# Patient Record
Sex: FEMALE | Race: WHITE | NOT HISPANIC OR LATINO | Employment: OTHER | URBAN - METROPOLITAN AREA
[De-identification: names, ages, dates, MRNs, and addresses within clinical notes are randomized per-mention and may not be internally consistent; named-entity substitution may affect disease eponyms.]

---

## 2023-07-05 PROBLEM — R32 URINARY INCONTINENCE: Status: ACTIVE | Noted: 2023-07-05

## 2023-07-05 PROBLEM — R53.81 DEBILITY: Status: ACTIVE | Noted: 2023-07-05

## 2023-07-05 PROBLEM — I34.1 MITRAL VALVE PROLAPSE: Status: ACTIVE | Noted: 2023-07-05

## 2023-07-05 PROBLEM — E78.1 HYPERTRIGLYCERIDEMIA: Status: ACTIVE | Noted: 2023-07-05

## 2023-07-05 PROBLEM — T18.128A FOOD IMPACTION OF ESOPHAGUS: Status: ACTIVE | Noted: 2023-07-05
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
## 2023-07-05 PROBLEM — K21.9 ESOPHAGEAL REFLUX: Status: ACTIVE | Noted: 2023-07-05

## 2023-07-05 PROBLEM — K44.9 HIATAL HERNIA: Status: ACTIVE | Noted: 2023-07-05

## 2023-07-05 PROBLEM — R60.9 CHRONIC EDEMA: Status: ACTIVE | Noted: 2023-07-05

## 2023-07-05 PROBLEM — I10 BENIGN ESSENTIAL HYPERTENSION: Status: ACTIVE | Noted: 2023-07-05

## 2023-07-05 PROBLEM — K22.2 ESOPHAGEAL STRICTURE: Status: ACTIVE | Noted: 2023-07-05

## 2023-07-05 PROBLEM — J30.9 ALLERGIC RHINITIS: Status: ACTIVE | Noted: 2023-07-05

## 2023-07-05 PROBLEM — M89.9 BONE DISORDER: Status: ACTIVE | Noted: 2023-07-05

## 2023-07-05 PROBLEM — M81.0 OSTEOPOROSIS: Status: ACTIVE | Noted: 2023-07-05

## 2023-07-05 PROBLEM — M19.90 OSTEOARTHRITIS (ARTHRITIS DUE TO WEAR AND TEAR OF JOINTS): Status: ACTIVE | Noted: 2023-07-05

## 2023-07-05 PROBLEM — L81.9 HYPERPIGMENTED SKIN LESION: Status: ACTIVE | Noted: 2023-07-05

## 2023-07-05 PROBLEM — R13.10 DYSPHAGIA: Status: ACTIVE | Noted: 2023-07-05

## 2023-07-05 RX ORDER — ACEBUTOLOL HYDROCHLORIDE 200 MG/1
1 CAPSULE ORAL DAILY
COMMUNITY
End: 2023-07-06 | Stop reason: SDUPTHER

## 2023-07-05 RX ORDER — CHOLECALCIFEROL (VITAMIN D3) 125 MCG
125 CAPSULE ORAL 2 TIMES WEEKLY
COMMUNITY

## 2023-07-05 RX ORDER — PANTOPRAZOLE SODIUM 40 MG/1
40 TABLET, DELAYED RELEASE ORAL
COMMUNITY
Start: 2022-06-20 | End: 2023-07-06 | Stop reason: SDUPTHER

## 2023-07-05 RX ORDER — FUROSEMIDE 40 MG/1
1 TABLET ORAL DAILY
COMMUNITY
End: 2023-07-06 | Stop reason: SDUPTHER

## 2023-07-05 RX ORDER — FLUTICASONE PROPIONATE 50 MCG
1 SPRAY, SUSPENSION (ML) NASAL DAILY
COMMUNITY
End: 2023-07-06 | Stop reason: SDUPTHER

## 2023-07-05 RX ORDER — EPINEPHRINE 0.15 MG/.3ML
1 INJECTION INTRAMUSCULAR ONCE
COMMUNITY

## 2023-07-05 RX ORDER — GUAIFENESIN 1200 MG
325 TABLET, EXTENDED RELEASE 12 HR ORAL EVERY 4 HOURS PRN
COMMUNITY

## 2023-07-05 RX ORDER — POTASSIUM CHLORIDE 750 MG/1
10 CAPSULE, EXTENDED RELEASE ORAL
COMMUNITY
End: 2023-07-06 | Stop reason: SDUPTHER

## 2023-07-06 ENCOUNTER — OFFICE VISIT (OUTPATIENT)
Dept: PRIMARY CARE | Facility: CLINIC | Age: 88
End: 2023-07-06
Payer: MEDICARE

## 2023-07-06 VITALS
RESPIRATION RATE: 16 BRPM | BODY MASS INDEX: 26.07 KG/M2 | TEMPERATURE: 96.8 F | SYSTOLIC BLOOD PRESSURE: 135 MMHG | HEART RATE: 96 BPM | DIASTOLIC BLOOD PRESSURE: 88 MMHG | OXYGEN SATURATION: 93 % | WEIGHT: 138 LBS

## 2023-07-06 DIAGNOSIS — J30.9 ALLERGIC RHINITIS, UNSPECIFIED SEASONALITY, UNSPECIFIED TRIGGER: ICD-10-CM

## 2023-07-06 DIAGNOSIS — I10 BENIGN ESSENTIAL HYPERTENSION: Primary | ICD-10-CM

## 2023-07-06 DIAGNOSIS — E87.6 HYPOKALEMIA: ICD-10-CM

## 2023-07-06 DIAGNOSIS — K22.2 ESOPHAGEAL STRICTURE: ICD-10-CM

## 2023-07-06 DIAGNOSIS — E78.1 HYPERTRIGLYCERIDEMIA: ICD-10-CM

## 2023-07-06 DIAGNOSIS — M81.0 AGE-RELATED OSTEOPOROSIS WITHOUT CURRENT PATHOLOGICAL FRACTURE: ICD-10-CM

## 2023-07-06 DIAGNOSIS — R60.9 CHRONIC EDEMA: ICD-10-CM

## 2023-07-06 PROBLEM — R13.10 DYSPHAGIA: Status: RESOLVED | Noted: 2023-07-05 | Resolved: 2023-07-06
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
## 2023-07-06 PROBLEM — M89.9 BONE DISORDER: Status: RESOLVED | Noted: 2023-07-05 | Resolved: 2023-07-06

## 2023-07-06 PROBLEM — T18.128A FOOD IMPACTION OF ESOPHAGUS: Status: RESOLVED | Noted: 2023-07-05 | Resolved: 2023-07-06

## 2023-07-06 PROBLEM — L81.9 HYPERPIGMENTED SKIN LESION: Status: RESOLVED | Noted: 2023-07-05 | Resolved: 2023-07-06

## 2023-07-06 PROBLEM — K21.9 ESOPHAGEAL REFLUX: Status: RESOLVED | Noted: 2023-07-05 | Resolved: 2023-07-06

## 2023-07-06 PROCEDURE — 1126F AMNT PAIN NOTED NONE PRSNT: CPT

## 2023-07-06 PROCEDURE — 3079F DIAST BP 80-89 MM HG: CPT

## 2023-07-06 PROCEDURE — 1036F TOBACCO NON-USER: CPT

## 2023-07-06 PROCEDURE — 3075F SYST BP GE 130 - 139MM HG: CPT

## 2023-07-06 PROCEDURE — 1159F MED LIST DOCD IN RCRD: CPT

## 2023-07-06 PROCEDURE — 99214 OFFICE O/P EST MOD 30 MIN: CPT

## 2023-07-06 PROCEDURE — 1160F RVW MEDS BY RX/DR IN RCRD: CPT

## 2023-07-06 RX ORDER — ACEBUTOLOL HYDROCHLORIDE 200 MG/1
200 CAPSULE ORAL DAILY
Qty: 90 CAPSULE | Refills: 1 | Status: SHIPPED | OUTPATIENT
Start: 2023-07-06 | End: 2024-01-05

## 2023-07-06 RX ORDER — FLUTICASONE PROPIONATE 50 MCG
1 SPRAY, SUSPENSION (ML) NASAL DAILY
Qty: 16 G | Refills: 3 | Status: SHIPPED | OUTPATIENT
Start: 2023-07-06

## 2023-07-06 RX ORDER — POTASSIUM CHLORIDE 750 MG/1
10 CAPSULE, EXTENDED RELEASE ORAL DAILY
Qty: 90 CAPSULE | Refills: 1 | Status: SHIPPED | OUTPATIENT
Start: 2023-07-06 | End: 2024-01-30 | Stop reason: SDUPTHER

## 2023-07-06 RX ORDER — PANTOPRAZOLE SODIUM 40 MG/1
40 TABLET, DELAYED RELEASE ORAL
Qty: 90 TABLET | Refills: 1 | Status: CANCELLED | OUTPATIENT
Start: 2023-07-06

## 2023-07-06 RX ORDER — PANTOPRAZOLE SODIUM 40 MG/1
40 TABLET, DELAYED RELEASE ORAL
Qty: 90 TABLET | Refills: 3 | Status: SHIPPED | OUTPATIENT
Start: 2023-07-06

## 2023-07-06 RX ORDER — FUROSEMIDE 40 MG/1
40 TABLET ORAL DAILY
Qty: 90 TABLET | Refills: 1 | Status: SHIPPED | OUTPATIENT
Start: 2023-07-06 | End: 2024-01-30 | Stop reason: SDUPTHER

## 2023-07-06 SDOH — ECONOMIC STABILITY: FOOD INSECURITY: WITHIN THE PAST 12 MONTHS, YOU WORRIED THAT YOUR FOOD WOULD RUN OUT BEFORE YOU GOT MONEY TO BUY MORE.: NEVER TRUE

## 2023-07-06 SDOH — ECONOMIC STABILITY: FOOD INSECURITY: WITHIN THE PAST 12 MONTHS, THE FOOD YOU BOUGHT JUST DIDN'T LAST AND YOU DIDN'T HAVE MONEY TO GET MORE.: NEVER TRUE

## 2023-07-06 ASSESSMENT — ENCOUNTER SYMPTOMS
CONSTITUTIONAL NEGATIVE: 1
LOSS OF SENSATION IN FEET: 0
RESPIRATORY NEGATIVE: 1
CARDIOVASCULAR NEGATIVE: 1
EYES NEGATIVE: 1
OCCASIONAL FEELINGS OF UNSTEADINESS: 0
GASTROINTESTINAL NEGATIVE: 1
NEUROLOGICAL NEGATIVE: 1
DEPRESSION: 0
ENDOCRINE NEGATIVE: 1
MUSCULOSKELETAL NEGATIVE: 1
HEMATOLOGIC/LYMPHATIC NEGATIVE: 1
PSYCHIATRIC NEGATIVE: 1

## 2023-07-06 ASSESSMENT — PATIENT HEALTH QUESTIONNAIRE - PHQ9
2. FEELING DOWN, DEPRESSED OR HOPELESS: NOT AT ALL
1. LITTLE INTEREST OR PLEASURE IN DOING THINGS: NOT AT ALL
SUM OF ALL RESPONSES TO PHQ9 QUESTIONS 1 & 2: 0

## 2023-07-06 ASSESSMENT — PAIN SCALES - GENERAL: PAINLEVEL: 0-NO PAIN

## 2023-07-06 ASSESSMENT — LIFESTYLE VARIABLES
HOW OFTEN DO YOU HAVE SIX OR MORE DRINKS ON ONE OCCASION: NEVER
SKIP TO QUESTIONS 9-10: 1
HOW OFTEN DO YOU HAVE A DRINK CONTAINING ALCOHOL: NEVER
AUDIT-C TOTAL SCORE: 0
HOW MANY STANDARD DRINKS CONTAINING ALCOHOL DO YOU HAVE ON A TYPICAL DAY: PATIENT DOES NOT DRINK

## 2023-07-06 NOTE — PROGRESS NOTES
Subjective   Patient ID: Nadja Rodriguez is a 88 y.o. female who presents for routine follow up.    Cards: No cardiology recently after her last cardiologist left a few years ago.    Diet: Does her own cooking, cooking with microwave. Using microwave, crock pot and [employer] electric grill. Trouble using her left hand. Well balanced diet.   Exercise: Goes three times per week to [employer] to use the pool, water exercises  Weight: Stable  Water: Drinking several bottles per water  Sleep: Waking up twice per night to use bathroom, up at 5:30, remains on schedule despite being retired. Getting about 7-8 hours per night  Social: , lives alone. 2 adult children (3 have passed from cancer, MI, motor cycle accident) son lives in [place], identifies her daughter in law's mother as support source. 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    HENT: Negative.     Eyes: Negative.    Respiratory: Negative.     Cardiovascular: Negative.    Gastrointestinal: Negative.    Endocrine: Negative.    Genitourinary: Negative.    Musculoskeletal: Negative.    Skin: Negative.    Neurological: Negative.    Hematological: Negative.    Psychiatric/Behavioral: Negative.          Current Outpatient Medications   Medication Sig Dispense Refill    acetaminophen (TylenoL) 325 mg capsule Take 1 capsule (325 mg) by mouth every 4 hours if needed for mild pain (1 - 3) or moderate pain (4 - 6).      cholecalciferol (Vitamin D-3) 125 MCG (5000 UT) capsule Take 1 capsule (125 mcg) by mouth once daily.      EPINEPHrine (EpiPen Jr) 0.15 mg/0.3 mL injection syringe Inject 0.3 mL (0.15 mg) as directed 1 time.      acebutolol (Sectral) 200 mg capsule Take 1 capsule (200 mg) by mouth once daily. 90 capsule 1    denosumab (Prolia) 60 mg/mL syringe INJECT 60MG SUBCUTANEOUSLY ONCE AND REPEAT EVERY 6 MONTHS 1 mL 1    fluticasone (Flonase) 50 mcg/actuation nasal spray Administer 1 spray into each  nostril once daily. 16 g 3    furosemide (Lasix) 40 mg tablet Take 1 tablet (40 mg) by mouth once daily. 90 tablet 1    pantoprazole (ProtoNix) 40 mg EC tablet Take 1 tablet (40 mg) by mouth once daily in the morning. Take before meals. 90 tablet 3    potassium chloride ER (Micro-K) 10 mEq ER capsule Take 1 capsule (10 mEq) by mouth once daily. 90 capsule 1     No current facility-administered medications for this visit.     Past Surgical History:   Procedure Laterality Date    OTHER SURGICAL HISTORY  2019    Hysterectomy    OTHER SURGICAL HISTORY  2019    Knee surgery     Family History   Problem Relation Name Age of Onset    Lung cancer Other        Social History     Tobacco Use    Smoking status: Never    Smokeless tobacco: Never   Vaping Use    Vaping Use: Never used   Substance Use Topics    Alcohol use: Never    Drug use: Never        Objective     Visit Vitals  /88 (BP Location: Left arm, Patient Position: Sitting, BP Cuff Size: Adult)   Pulse 96   Temp 36 °C (96.8 °F) (Temporal)   Resp 16   Wt 62.6 kg (138 lb)   SpO2 93%   BMI 26.07 kg/m²   Smoking Status Never   BSA 1.64 m²        Physical Exam  Constitutional:       Appearance: Normal appearance.   HENT:      Head: Normocephalic and atraumatic.   Eyes:      Extraocular Movements: Extraocular movements intact.      Pupils: Pupils are equal, round, and reactive to light.   Cardiovascular:      Rate and Rhythm: Normal rate and regular rhythm.   Pulmonary:      Effort: Pulmonary effort is normal.      Breath sounds: Normal breath sounds.   Abdominal:      General: Abdomen is flat. Bowel sounds are normal.      Palpations: Abdomen is soft.   Musculoskeletal:         General: Normal range of motion.      Right lower le+ Edema present.      Left lower le+ Edema present.   Neurological:      General: No focal deficit present.      Mental Status: She is alert and oriented to person, place, and time.   Psychiatric:         Mood and Affect:  Mood normal.         Behavior: Behavior normal.           Assessment/Plan   Problem List Items Addressed This Visit       Benign essential hypertension - Primary     Well controlled in office today at 135/88  Continue acebutolol 200mg daily         Relevant Medications    furosemide (Lasix) 40 mg tablet    acebutolol (Sectral) 200 mg capsule    Other Relevant Orders    CBC    Comprehensive Metabolic Panel    Follow Up In Primary Care - Established    Allergic rhinitis    Relevant Medications    fluticasone (Flonase) 50 mcg/actuation nasal spray    Esophageal stricture     Following periodically with GI  Has not been taking daily PPI as recommended for hx gastritis found on EGD 6/2022    Discussed importance of daily PPI, reordered pantoprazole 40mg daily         Relevant Medications    pantoprazole (ProtoNix) 40 mg EC tablet    Chronic edema    Relevant Orders    TSH with reflex to Free T4 if abnormal    Osteoporosis     DEXA from 11/2021 showing osteoporosis in all sites, patient not previously treated    Defer oral bisphospohnate d/t hx esophageal stricture  Start prolia injections q6months, order printed and faxed to infusion center  Check BMP and vitamin D levels         Relevant Medications    denosumab (Prolia) 60 mg/mL syringe    Other Relevant Orders    Vitamin D, Total    Follow Up In Primary Care - Established    Hypertriglyceridemia    Relevant Orders    Lipid Panel     Other Visit Diagnoses       Hypokalemia        Relevant Medications    potassium chloride ER (Micro-K) 10 mEq ER capsule          All pertinent lab work and results were reviewed with patient.     Follow up with me in 6 months or sooner as needed    Rossi Steward, CRYS-CNS

## 2023-07-06 NOTE — PATIENT INSTRUCTIONS
Thank you for coming to see me today.  If you have any questions or concerns following our visit, please contact the office.  Phone: [phone]    Follow up with me in 6 months or sooner as needed    1)  START prolia injections- infusion center will call you to schedule appointment to have this administered    2) START pantoprazole 40mg daily 30 minutes before breakfast to help with inflammation of stomach lining    3) Get fasting labwork in the next 1-2 weeks.  The lab is down the [address] from our office.

## 2023-07-06 NOTE — ASSESSMENT & PLAN NOTE
DEXA from 11/2021 showing osteoporosis in all sites, patient not previously treated    Defer oral bisphospohnate d/t hx esophageal stricture  Start prolia injections q6months, order printed and faxed to infusion center  Check BMP and vitamin D levels

## 2023-07-06 NOTE — ASSESSMENT & PLAN NOTE
Following periodically with GI  Has not been taking daily PPI as recommended for hx gastritis found on EGD 6/2022    Discussed importance of daily PPI, reordered pantoprazole 40mg daily

## 2023-07-10 ENCOUNTER — LAB (OUTPATIENT)
Dept: LAB | Facility: LAB | Age: 88
End: 2023-07-10
Payer: MEDICARE

## 2023-07-10 DIAGNOSIS — M81.0 AGE-RELATED OSTEOPOROSIS WITHOUT CURRENT PATHOLOGICAL FRACTURE: ICD-10-CM

## 2023-07-10 DIAGNOSIS — R60.9 CHRONIC EDEMA: ICD-10-CM

## 2023-07-10 DIAGNOSIS — E78.1 HYPERTRIGLYCERIDEMIA: ICD-10-CM

## 2023-07-10 DIAGNOSIS — I10 BENIGN ESSENTIAL HYPERTENSION: ICD-10-CM

## 2023-07-10 LAB
ALANINE AMINOTRANSFERASE (SGPT) (U/L) IN SER/PLAS: 16 U/L (ref 7–45)
ALBUMIN (G/DL) IN SER/PLAS: 3.9 G/DL (ref 3.4–5)
ALKALINE PHOSPHATASE (U/L) IN SER/PLAS: 138 U/L (ref 33–136)
ANION GAP IN SER/PLAS: 17 MMOL/L (ref 10–20)
ASPARTATE AMINOTRANSFERASE (SGOT) (U/L) IN SER/PLAS: 20 U/L (ref 9–39)
BILIRUBIN TOTAL (MG/DL) IN SER/PLAS: 0.5 MG/DL (ref 0–1.2)
CALCIDIOL (25 OH VITAMIN D3) (NG/ML) IN SER/PLAS: 88 NG/ML
CALCIUM (MG/DL) IN SER/PLAS: 9.3 MG/DL (ref 8.6–10.3)
CARBON DIOXIDE, TOTAL (MMOL/L) IN SER/PLAS: 28 MMOL/L (ref 21–32)
CHLORIDE (MMOL/L) IN SER/PLAS: 101 MMOL/L (ref 98–107)
CHOLESTEROL (MG/DL) IN SER/PLAS: 172 MG/DL (ref 0–199)
CHOLESTEROL IN HDL (MG/DL) IN SER/PLAS: 30 MG/DL
CHOLESTEROL/HDL RATIO: 5.7
CREATININE (MG/DL) IN SER/PLAS: 0.53 MG/DL (ref 0.5–1.05)
ERYTHROCYTE DISTRIBUTION WIDTH (RATIO) BY AUTOMATED COUNT: 14 % (ref 11.5–14.5)
ERYTHROCYTE MEAN CORPUSCULAR HEMOGLOBIN CONCENTRATION (G/DL) BY AUTOMATED: 32 G/DL (ref 32–36)
ERYTHROCYTE MEAN CORPUSCULAR VOLUME (FL) BY AUTOMATED COUNT: 88 FL (ref 80–100)
ERYTHROCYTES (10*6/UL) IN BLOOD BY AUTOMATED COUNT: 4.78 X10E12/L (ref 4–5.2)
GFR FEMALE: 90 ML/MIN/1.73M2
GLUCOSE (MG/DL) IN SER/PLAS: 83 MG/DL (ref 74–99)
HEMATOCRIT (%) IN BLOOD BY AUTOMATED COUNT: 42.2 % (ref 36–46)
HEMOGLOBIN (G/DL) IN BLOOD: 13.5 G/DL (ref 12–16)
LDL: 105 MG/DL (ref 0–99)
LEUKOCYTES (10*3/UL) IN BLOOD BY AUTOMATED COUNT: 8.5 X10E9/L (ref 4.4–11.3)
PLATELETS (10*3/UL) IN BLOOD AUTOMATED COUNT: 245 X10E9/L (ref 150–450)
POTASSIUM (MMOL/L) IN SER/PLAS: 3.8 MMOL/L (ref 3.5–5.3)
PROTEIN TOTAL: 6.6 G/DL (ref 6.4–8.2)
SODIUM (MMOL/L) IN SER/PLAS: 142 MMOL/L (ref 136–145)
THYROTROPIN (MIU/L) IN SER/PLAS BY DETECTION LIMIT <= 0.05 MIU/L: 4.19 MIU/L (ref 0.44–3.98)
THYROXINE (T4) FREE (NG/DL) IN SER/PLAS: 0.93 NG/DL (ref 0.61–1.12)
TRIGLYCERIDE (MG/DL) IN SER/PLAS: 184 MG/DL (ref 0–149)
UREA NITROGEN (MG/DL) IN SER/PLAS: 14 MG/DL (ref 6–23)
VLDL: 37 MG/DL (ref 0–40)

## 2023-07-10 PROCEDURE — 84439 ASSAY OF FREE THYROXINE: CPT

## 2023-07-10 PROCEDURE — 82306 VITAMIN D 25 HYDROXY: CPT

## 2023-07-10 PROCEDURE — 84443 ASSAY THYROID STIM HORMONE: CPT

## 2023-07-10 PROCEDURE — 80061 LIPID PANEL: CPT

## 2023-07-10 PROCEDURE — 36415 COLL VENOUS BLD VENIPUNCTURE: CPT

## 2023-07-10 PROCEDURE — 80053 COMPREHEN METABOLIC PANEL: CPT

## 2023-07-10 PROCEDURE — 85027 COMPLETE CBC AUTOMATED: CPT

## 2024-01-02 DIAGNOSIS — I10 BENIGN ESSENTIAL HYPERTENSION: ICD-10-CM

## 2024-01-05 RX ORDER — ACEBUTOLOL HYDROCHLORIDE 200 MG/1
200 CAPSULE ORAL DAILY
Qty: 90 CAPSULE | Refills: 1 | Status: SHIPPED | OUTPATIENT
Start: 2024-01-05

## 2024-01-08 ENCOUNTER — APPOINTMENT (OUTPATIENT)
Dept: PRIMARY CARE | Facility: CLINIC | Age: 89
End: 2024-01-08
Payer: MEDICARE

## 2024-01-10 ENCOUNTER — APPOINTMENT (OUTPATIENT)
Dept: PRIMARY CARE | Facility: CLINIC | Age: 89
End: 2024-01-10
Payer: MEDICARE

## 2024-01-30 ENCOUNTER — OFFICE VISIT (OUTPATIENT)
Dept: PRIMARY CARE | Facility: CLINIC | Age: 89
End: 2024-01-30
Payer: MEDICARE

## 2024-01-30 ENCOUNTER — LAB (OUTPATIENT)
Dept: LAB | Facility: LAB | Age: 89
End: 2024-01-30
Payer: MEDICARE

## 2024-01-30 VITALS
WEIGHT: 141.5 LBS | SYSTOLIC BLOOD PRESSURE: 137 MMHG | TEMPERATURE: 96.8 F | HEART RATE: 77 BPM | RESPIRATION RATE: 18 BRPM | BODY MASS INDEX: 27.78 KG/M2 | HEIGHT: 60 IN | DIASTOLIC BLOOD PRESSURE: 82 MMHG | OXYGEN SATURATION: 96 %

## 2024-01-30 DIAGNOSIS — Z23 NEED FOR PNEUMOCOCCAL VACCINATION: ICD-10-CM

## 2024-01-30 DIAGNOSIS — Z00.00 ENCOUNTER FOR MEDICARE ANNUAL WELLNESS EXAM: ICD-10-CM

## 2024-01-30 DIAGNOSIS — M81.8 OSTEOPOROSIS, IDIOPATHIC: ICD-10-CM

## 2024-01-30 DIAGNOSIS — M81.0 AGE-RELATED OSTEOPOROSIS WITHOUT CURRENT PATHOLOGICAL FRACTURE: Primary | ICD-10-CM

## 2024-01-30 DIAGNOSIS — I10 BENIGN ESSENTIAL HYPERTENSION: ICD-10-CM

## 2024-01-30 DIAGNOSIS — E87.6 HYPOKALEMIA: ICD-10-CM

## 2024-01-30 DIAGNOSIS — M81.0 AGE-RELATED OSTEOPOROSIS WITHOUT CURRENT PATHOLOGICAL FRACTURE: ICD-10-CM

## 2024-01-30 DIAGNOSIS — Z00.00 ROUTINE GENERAL MEDICAL EXAMINATION AT HEALTH CARE FACILITY: ICD-10-CM

## 2024-01-30 LAB
25(OH)D3 SERPL-MCNC: 111 NG/ML (ref 30–100)
ALBUMIN SERPL BCP-MCNC: 4.3 G/DL (ref 3.4–5)
ALP SERPL-CCNC: 71 U/L (ref 33–136)
ALT SERPL W P-5'-P-CCNC: 17 U/L (ref 7–45)
ANION GAP SERPL CALC-SCNC: 13 MMOL/L (ref 10–20)
AST SERPL W P-5'-P-CCNC: 25 U/L (ref 9–39)
BILIRUB SERPL-MCNC: 0.5 MG/DL (ref 0–1.2)
BUN SERPL-MCNC: 11 MG/DL (ref 6–23)
CA-I BLD-SCNC: 1.21 MMOL/L (ref 1.1–1.33)
CALCIUM SERPL-MCNC: 9.5 MG/DL (ref 8.6–10.3)
CHLORIDE SERPL-SCNC: 102 MMOL/L (ref 98–107)
CO2 SERPL-SCNC: 27 MMOL/L (ref 21–32)
CREAT SERPL-MCNC: 0.59 MG/DL (ref 0.5–1.05)
EGFRCR SERPLBLD CKD-EPI 2021: 88 ML/MIN/1.73M*2
ERYTHROCYTE [DISTWIDTH] IN BLOOD BY AUTOMATED COUNT: 13.9 % (ref 11.5–14.5)
GLUCOSE SERPL-MCNC: 96 MG/DL (ref 74–99)
HCT VFR BLD AUTO: 45.6 % (ref 36–46)
HGB BLD-MCNC: 14.9 G/DL (ref 12–16)
MCH RBC QN AUTO: 29.4 PG (ref 26–34)
MCHC RBC AUTO-ENTMCNC: 32.7 G/DL (ref 32–36)
MCV RBC AUTO: 90 FL (ref 80–100)
NRBC BLD-RTO: 0 /100 WBCS (ref 0–0)
PLATELET # BLD AUTO: 219 X10*3/UL (ref 150–450)
POTASSIUM SERPL-SCNC: 3.8 MMOL/L (ref 3.5–5.3)
PROT SERPL-MCNC: 6.7 G/DL (ref 6.4–8.2)
RBC # BLD AUTO: 5.07 X10*6/UL (ref 4–5.2)
SODIUM SERPL-SCNC: 138 MMOL/L (ref 136–145)
TSH SERPL-ACNC: 2.84 MIU/L (ref 0.44–3.98)
WBC # BLD AUTO: 7.1 X10*3/UL (ref 4.4–11.3)

## 2024-01-30 PROCEDURE — 3079F DIAST BP 80-89 MM HG: CPT

## 2024-01-30 PROCEDURE — 36415 COLL VENOUS BLD VENIPUNCTURE: CPT

## 2024-01-30 PROCEDURE — 82306 VITAMIN D 25 HYDROXY: CPT

## 2024-01-30 PROCEDURE — 1126F AMNT PAIN NOTED NONE PRSNT: CPT

## 2024-01-30 PROCEDURE — G0009 ADMIN PNEUMOCOCCAL VACCINE: HCPCS

## 2024-01-30 PROCEDURE — 1159F MED LIST DOCD IN RCRD: CPT

## 2024-01-30 PROCEDURE — 3075F SYST BP GE 130 - 139MM HG: CPT

## 2024-01-30 PROCEDURE — 99397 PER PM REEVAL EST PAT 65+ YR: CPT

## 2024-01-30 PROCEDURE — 1036F TOBACCO NON-USER: CPT

## 2024-01-30 PROCEDURE — 1160F RVW MEDS BY RX/DR IN RCRD: CPT

## 2024-01-30 PROCEDURE — 80053 COMPREHEN METABOLIC PANEL: CPT

## 2024-01-30 PROCEDURE — 84443 ASSAY THYROID STIM HORMONE: CPT

## 2024-01-30 PROCEDURE — 90677 PCV20 VACCINE IM: CPT

## 2024-01-30 PROCEDURE — G0439 PPPS, SUBSEQ VISIT: HCPCS

## 2024-01-30 PROCEDURE — 1170F FXNL STATUS ASSESSED: CPT

## 2024-01-30 PROCEDURE — 1123F ACP DISCUSS/DSCN MKR DOCD: CPT

## 2024-01-30 PROCEDURE — 85027 COMPLETE CBC AUTOMATED: CPT

## 2024-01-30 PROCEDURE — 82330 ASSAY OF CALCIUM: CPT

## 2024-01-30 PROCEDURE — 99212 OFFICE O/P EST SF 10 MIN: CPT

## 2024-01-30 RX ORDER — EPINEPHRINE 0.3 MG/.3ML
0.3 INJECTION SUBCUTANEOUS EVERY 5 MIN PRN
Status: CANCELLED | OUTPATIENT
Start: 2024-02-04

## 2024-01-30 RX ORDER — ALBUTEROL SULFATE 0.83 MG/ML
3 SOLUTION RESPIRATORY (INHALATION) AS NEEDED
Status: CANCELLED | OUTPATIENT
Start: 2024-02-04

## 2024-01-30 RX ORDER — DIPHENHYDRAMINE HYDROCHLORIDE 50 MG/ML
50 INJECTION INTRAMUSCULAR; INTRAVENOUS AS NEEDED
Status: CANCELLED | OUTPATIENT
Start: 2024-02-04

## 2024-01-30 RX ORDER — FUROSEMIDE 40 MG/1
40 TABLET ORAL DAILY
Qty: 90 TABLET | Refills: 3 | Status: SHIPPED | OUTPATIENT
Start: 2024-01-30

## 2024-01-30 RX ORDER — FAMOTIDINE 10 MG/ML
20 INJECTION INTRAVENOUS ONCE AS NEEDED
Status: CANCELLED | OUTPATIENT
Start: 2024-02-04

## 2024-01-30 RX ORDER — POTASSIUM CHLORIDE 750 MG/1
10 CAPSULE, EXTENDED RELEASE ORAL DAILY
Qty: 90 CAPSULE | Refills: 3 | Status: SHIPPED | OUTPATIENT
Start: 2024-01-30

## 2024-01-30 SDOH — ECONOMIC STABILITY: FOOD INSECURITY: WITHIN THE PAST 12 MONTHS, YOU WORRIED THAT YOUR FOOD WOULD RUN OUT BEFORE YOU GOT MONEY TO BUY MORE.: NEVER TRUE

## 2024-01-30 SDOH — ECONOMIC STABILITY: FOOD INSECURITY: WITHIN THE PAST 12 MONTHS, THE FOOD YOU BOUGHT JUST DIDN'T LAST AND YOU DIDN'T HAVE MONEY TO GET MORE.: NEVER TRUE

## 2024-01-30 ASSESSMENT — ENCOUNTER SYMPTOMS
PSYCHIATRIC NEGATIVE: 1
CONSTITUTIONAL NEGATIVE: 1
RESPIRATORY NEGATIVE: 1
LOSS OF SENSATION IN FEET: 0
EYES NEGATIVE: 1
DEPRESSION: 0
NEUROLOGICAL NEGATIVE: 1
GASTROINTESTINAL NEGATIVE: 1
CARDIOVASCULAR NEGATIVE: 1
OCCASIONAL FEELINGS OF UNSTEADINESS: 0
HEMATOLOGIC/LYMPHATIC NEGATIVE: 1
ENDOCRINE NEGATIVE: 1
MUSCULOSKELETAL NEGATIVE: 1

## 2024-01-30 ASSESSMENT — ACTIVITIES OF DAILY LIVING (ADL)
BATHING: INDEPENDENT
DRESSING: INDEPENDENT
TAKING_MEDICATION: INDEPENDENT
DOING_HOUSEWORK: INDEPENDENT
GROCERY_SHOPPING: INDEPENDENT
MANAGING_FINANCES: INDEPENDENT

## 2024-01-30 ASSESSMENT — LIFESTYLE VARIABLES
AUDIT-C TOTAL SCORE: 0
SKIP TO QUESTIONS 9-10: 1
HOW OFTEN DO YOU HAVE A DRINK CONTAINING ALCOHOL: NEVER
HOW MANY STANDARD DRINKS CONTAINING ALCOHOL DO YOU HAVE ON A TYPICAL DAY: PATIENT DOES NOT DRINK
HOW OFTEN DO YOU HAVE SIX OR MORE DRINKS ON ONE OCCASION: NEVER

## 2024-01-30 ASSESSMENT — PATIENT HEALTH QUESTIONNAIRE - PHQ9
2. FEELING DOWN, DEPRESSED OR HOPELESS: NOT AT ALL
SUM OF ALL RESPONSES TO PHQ9 QUESTIONS 1 & 2: 0
1. LITTLE INTEREST OR PLEASURE IN DOING THINGS: NOT AT ALL

## 2024-01-30 NOTE — PATIENT INSTRUCTIONS
Thank you for coming to see me today.  If you have any questions or concerns following our visit, please contact the office.  Phone: [phone]    Follow up with me in 6 months    1)  Get non-fasting labwork today.  The lab is down the [place] from our office.     2) I have referred you to the infusion center for prolia injections every 6 months to treat osteoporosis. Please go to these every 6 months    3) Please schedule a bone density scan - please call [phone] or stop to 's office (in the lab office) on your way out today.

## 2024-01-30 NOTE — ASSESSMENT & PLAN NOTE
Wellness screenings/Immunizations:  Flu vaccination: Recommended annually  PCV: PCV 20 recommended and given in office today  PPSV: Deferred  Shingrix vaccine: Deferred  Colon cancer screening: No longer indicated for preventative screening due to age  Mammogram: No longer indicated for preventative screening due to age  DEXA scan: Recommended and ordered, did not go to [facility] center to have Prolia as recommended at last visit

## 2024-01-30 NOTE — ASSESSMENT & PLAN NOTE
Mildly hypertensive in office at 137/82  Home blood pressures running 120/80s    Continue acebutolol 200mg daily

## 2024-02-12 ENCOUNTER — APPOINTMENT (OUTPATIENT)
Dept: RADIOLOGY | Facility: HOSPITAL | Age: 89
End: 2024-02-12
Payer: MEDICARE

## 2024-02-12 ENCOUNTER — HOSPITAL ENCOUNTER (EMERGENCY)
Facility: HOSPITAL | Age: 89
Discharge: HOME | End: 2024-02-12
Payer: MEDICARE

## 2024-02-12 VITALS
TEMPERATURE: 98 F | RESPIRATION RATE: 16 BRPM | OXYGEN SATURATION: 94 % | SYSTOLIC BLOOD PRESSURE: 160 MMHG | HEART RATE: 81 BPM | HEIGHT: 60 IN | DIASTOLIC BLOOD PRESSURE: 86 MMHG | BODY MASS INDEX: 26.5 KG/M2 | WEIGHT: 135 LBS

## 2024-02-12 DIAGNOSIS — T17.208A FOREIGN BODY IN THROAT, INITIAL ENCOUNTER: Primary | ICD-10-CM

## 2024-02-12 PROCEDURE — 99283 EMERGENCY DEPT VISIT LOW MDM: CPT

## 2024-02-12 PROCEDURE — 70360 X-RAY EXAM OF NECK: CPT | Performed by: RADIOLOGY

## 2024-02-12 PROCEDURE — 70360 X-RAY EXAM OF NECK: CPT

## 2024-02-12 ASSESSMENT — PAIN - FUNCTIONAL ASSESSMENT: PAIN_FUNCTIONAL_ASSESSMENT: 0-10

## 2024-02-12 ASSESSMENT — PAIN SCALES - GENERAL
PAINLEVEL_OUTOF10: 0 - NO PAIN
PAINLEVEL_OUTOF10: 0 - NO PAIN

## 2024-02-12 ASSESSMENT — COLUMBIA-SUICIDE SEVERITY RATING SCALE - C-SSRS
1. IN THE PAST MONTH, HAVE YOU WISHED YOU WERE DEAD OR WISHED YOU COULD GO TO SLEEP AND NOT WAKE UP?: NO
6. HAVE YOU EVER DONE ANYTHING, STARTED TO DO ANYTHING, OR PREPARED TO DO ANYTHING TO END YOUR LIFE?: NO
2. HAVE YOU ACTUALLY HAD ANY THOUGHTS OF KILLING YOURSELF?: NO

## 2024-02-13 DIAGNOSIS — M81.0 OSTEOPOROSIS WITHOUT CURRENT PATHOLOGICAL FRACTURE, UNSPECIFIED OSTEOPOROSIS TYPE: Primary | ICD-10-CM

## 2024-02-13 NOTE — ED PROVIDER NOTES
Chief Complaint   Patient presents with    Food bolus stuck in throat     Pt had chicken get stuck in her throat around noon today. Hx of them same normally has it removed under endoscopy, no difficulty breathing, controlling secretions       88-year-old female arrives to the emergency department after eating grilled chicken, the patient states that she has a piece of chicken lodged in her throat.  Patient states that this is the fourth time that she has had food lodged in her throat, that she has required surgical scopes in the past, and she is having difficulty drinking or eating, shortly after taking a bite or sip, it comes right back up.  The patient is managing her secretions, and is in no respiratory distress talking in clear sentences.  Prior to initial assessment, the patient was in the waiting room, and coughed up a large piece of chicken that has been chewed.  The patient kept the chicken and napkin to show the nurse in triage.  The patient states that she still has a minimal foreign body sensation, however the patient is able to tolerate drinking and eating without difficulty.  Patient denies any other symptoms or complaints      History provided by:  Patient   used: No         PmHx, PsHx, Allergies, Family Hx, social Hx reviewed as documented    A complete 10 point review of systems was performed and is negative except for as mentioned in the HPI.    Physical Exam:    General: Patient is AAOx3, appears well developed, well nourished, is a good historian, answers questions appropriately    HEENT: head normocephalic, atraumatic, PERRLA, EOMs intact, oropharynx without erythema or exudate, buccal mucosa intact without lesions, TMs unremarkable, nose is patent bilateral    Neck: Foreign body sensation, supple, full ROM, negative for lymphadenopathy, JVD, thyromegaly, tracheal deviation, nuccal rigidity    Pulmonary: CTAB, no accessory muscle use, able to speak full clear  sentences    Cardiac: HRRR, no murmurs, rubs or gallops    GI: soft, non-tender, non-distended, BS + x 4, no masses or organomegaly, no guarding or CVA tenderness noted, negative posada's, mcburney's    Musculoskeletal: full weight bearing, MCKENZIE, no joint effusions, clubbing or edema noted    Skin: intact, no lesions or rashes noted, turgor is good.    Neuro: patient follow commands, cranial nerves 2-12 grossly intact, motor strengths 5/5 upper and lower extremities, DTR's and sensation are symmetrical. No focal deficits.    Rectal/: No urinary burning, urgency, change in frequency.  Patient has no rectal complaints        Medical Decision Making  This patient was seen in the emergency department with an attending physician available at all times throughout their ED course    Primary consideration given this patient's history is a food bolus, possibly needing cleared with a surgical scope, glucagon was considered however upon initial assessment just prior the patient had coughed up the food bolus which was grilled chicken.  The patient now able to tolerate fluids and crackers without difficulty.  Given the patient remains having a foreign body sensation, consideration would be a partial food bolus or foreign body, an x-ray of the soft tissue of the neck was used to further evaluate showing no foreign body.  However chronic findings are appreciated.    On chart review, patient has followed with Dr. Nazario Kumar in the past, and will call tomorrow for an additional follow-up given her repeat symptoms of food bolus/foreign body.  The patient is requesting discharge at this time.    Patient is amenable to the plan of discharge as outlined above, all patient's questions pertaining to their ED course were answered in their entirety.  Strict return precautions were discussed with the patient and they verbalized understanding.  Further, it was made clear to the patient that from an emergent basis, all effort and testing  was done to eliminate any imminent dangerous or potentially dangerous conditions of the patient however if their symptoms get much worse or feel life-threatening, they are to return to the emergency department or call 911 immediately.    Amount and/or Complexity of Data Reviewed  Radiology: ordered. Decision-making details documented in ED Course.       Diagnoses as of 02/12/24 2153   Foreign body in throat, initial encounter       The patient has had the following imaging during this ER visit: XR NECK SOFT TISSUE LATERAL     Patient History   Past Medical History:   Diagnosis Date    Diverticulosis of intestine, part unspecified, without perforation or abscess without bleeding     Diverticulosis    Dysphagia 07/05/2023    Esophageal reflux 07/05/2023    Food impaction of esophagus 07/05/2023    Hyperpigmented skin lesion 07/05/2023    Personal history of other diseases of the musculoskeletal system and connective tissue     History of osteoarthritis    Personal history of other endocrine, nutritional and metabolic disease     History of obesity    Personal history of other specified conditions     History of insomnia    Rheumatic mitral valve disease, unspecified     Mitral valve problem     Past Surgical History:   Procedure Laterality Date    OTHER SURGICAL HISTORY  06/04/2019    Hysterectomy    OTHER SURGICAL HISTORY  06/04/2019    Knee surgery     Family History   Problem Relation Name Age of Onset    Lung cancer Other       Social History     Tobacco Use    Smoking status: Never    Smokeless tobacco: Never   Vaping Use    Vaping Use: Never used   Substance Use Topics    Alcohol use: Never    Drug use: Never       ED Triage Vitals   Temperature Heart Rate Respirations BP   02/12/24 1440 02/12/24 1440 02/12/24 1440 02/12/24 1440   36.7 °C (98 °F) 68 16 (!) 164/92      Pulse Ox Temp src Heart Rate Source Patient Position   02/12/24 1440 -- 02/12/24 1440 02/12/24 1940   (!) 93 %  Monitor Sitting      BP Location  FiO2 (%)     02/12/24 1940 --     Left arm        Vitals:    02/12/24 1440 02/12/24 1940   BP: (!) 164/92 160/86   BP Location:  Left arm   Patient Position:  Sitting   Pulse: 68 81   Resp: 16 16   Temp: 36.7 °C (98 °F)    SpO2: (!) 93% 94%   Weight: 61.2 kg (135 lb)    Height: 1.524 m (5')                CRYS Aponte-CNP  02/12/24 6321

## 2024-02-13 NOTE — DISCHARGE INSTRUCTIONS
Please follow-up with referred gastroenterologist for further evaluation.  Please maintain a soft diet, if there are any solid foods, please cut them up to mints size prior to ingesting

## 2024-02-15 ENCOUNTER — INFUSION (OUTPATIENT)
Dept: INFUSION THERAPY | Facility: HOSPITAL | Age: 89
End: 2024-02-15
Payer: MEDICARE

## 2024-02-15 ENCOUNTER — APPOINTMENT (OUTPATIENT)
Dept: INFUSION THERAPY | Facility: HOSPITAL | Age: 89
End: 2024-02-15
Payer: MEDICARE

## 2024-02-15 VITALS
HEART RATE: 65 BPM | SYSTOLIC BLOOD PRESSURE: 149 MMHG | OXYGEN SATURATION: 93 % | RESPIRATION RATE: 16 BRPM | DIASTOLIC BLOOD PRESSURE: 74 MMHG | TEMPERATURE: 97.9 F

## 2024-02-15 DIAGNOSIS — M81.0 AGE-RELATED OSTEOPOROSIS WITHOUT CURRENT PATHOLOGICAL FRACTURE: ICD-10-CM

## 2024-02-15 PROCEDURE — 2500000004 HC RX 250 GENERAL PHARMACY W/ HCPCS (ALT 636 FOR OP/ED): Mod: JZ

## 2024-02-15 PROCEDURE — 96372 THER/PROPH/DIAG INJ SC/IM: CPT

## 2024-02-15 RX ORDER — ALBUTEROL SULFATE 0.83 MG/ML
3 SOLUTION RESPIRATORY (INHALATION) AS NEEDED
OUTPATIENT
Start: 2024-08-13

## 2024-02-15 RX ORDER — EPINEPHRINE 0.3 MG/.3ML
0.3 INJECTION SUBCUTANEOUS EVERY 5 MIN PRN
OUTPATIENT
Start: 2024-08-13

## 2024-02-15 RX ORDER — FAMOTIDINE 10 MG/ML
20 INJECTION INTRAVENOUS ONCE AS NEEDED
OUTPATIENT
Start: 2024-08-13

## 2024-02-15 RX ORDER — DIPHENHYDRAMINE HYDROCHLORIDE 50 MG/ML
50 INJECTION INTRAMUSCULAR; INTRAVENOUS AS NEEDED
OUTPATIENT
Start: 2024-08-13

## 2024-02-15 RX ADMIN — DENOSUMAB 60 MG: 60 INJECTION SUBCUTANEOUS at 13:03

## 2024-02-15 ASSESSMENT — PATIENT HEALTH QUESTIONNAIRE - PHQ9
2. FEELING DOWN, DEPRESSED OR HOPELESS: NOT AT ALL
1. LITTLE INTEREST OR PLEASURE IN DOING THINGS: NOT AT ALL
SUM OF ALL RESPONSES TO PHQ9 QUESTIONS 1 AND 2: 0

## 2024-02-15 ASSESSMENT — COLUMBIA-SUICIDE SEVERITY RATING SCALE - C-SSRS
2. HAVE YOU ACTUALLY HAD ANY THOUGHTS OF KILLING YOURSELF?: NO
1. IN THE PAST MONTH, HAVE YOU WISHED YOU WERE DEAD OR WISHED YOU COULD GO TO SLEEP AND NOT WAKE UP?: NO
6. HAVE YOU EVER DONE ANYTHING, STARTED TO DO ANYTHING, OR PREPARED TO DO ANYTHING TO END YOUR LIFE?: NO

## 2024-02-15 ASSESSMENT — PAIN SCALES - GENERAL: PAINLEVEL: 10-WORST PAIN EVER

## 2024-02-15 ASSESSMENT — ENCOUNTER SYMPTOMS
OCCASIONAL FEELINGS OF UNSTEADINESS: 0
LOSS OF SENSATION IN FEET: 0
DEPRESSION: 0

## 2024-03-06 ENCOUNTER — HOSPITAL ENCOUNTER (OUTPATIENT)
Dept: RADIOLOGY | Facility: CLINIC | Age: 89
Discharge: HOME | End: 2024-03-06
Payer: MEDICARE

## 2024-03-06 DIAGNOSIS — M81.0 AGE-RELATED OSTEOPOROSIS WITHOUT CURRENT PATHOLOGICAL FRACTURE: ICD-10-CM

## 2024-03-06 PROCEDURE — 77080 DXA BONE DENSITY AXIAL: CPT

## 2024-03-13 ENCOUNTER — OFFICE VISIT (OUTPATIENT)
Dept: GASTROENTEROLOGY | Facility: CLINIC | Age: 89
End: 2024-03-13
Payer: MEDICARE

## 2024-03-13 VITALS
HEIGHT: 60 IN | BODY MASS INDEX: 28.07 KG/M2 | HEART RATE: 80 BPM | WEIGHT: 143 LBS | OXYGEN SATURATION: 92 % | DIASTOLIC BLOOD PRESSURE: 96 MMHG | SYSTOLIC BLOOD PRESSURE: 132 MMHG

## 2024-03-13 DIAGNOSIS — R13.19 OTHER DYSPHAGIA: Primary | ICD-10-CM

## 2024-03-13 PROBLEM — R53.1 WEAKNESS: Status: ACTIVE | Noted: 2023-07-05

## 2024-03-13 PROBLEM — G47.00 INSOMNIA, UNSPECIFIED: Status: ACTIVE | Noted: 2019-04-07

## 2024-03-13 PROBLEM — S52.209A ULNA FRACTURE: Status: RESOLVED | Noted: 2024-03-13 | Resolved: 2024-03-13

## 2024-03-13 PROBLEM — E66.9 OBESITY: Status: ACTIVE | Noted: 2018-06-18

## 2024-03-13 PROBLEM — S52.90XA FRACTURE OF RADIUS: Status: RESOLVED | Noted: 2022-04-19 | Resolved: 2024-03-13

## 2024-03-13 PROBLEM — E87.6 HYPOKALEMIA: Status: ACTIVE | Noted: 2024-01-30

## 2024-03-13 PROBLEM — K57.90 DIVERTICULAR DISEASE: Status: ACTIVE | Noted: 2019-04-07

## 2024-03-13 PROBLEM — S52.90XA RADIUS FRACTURE: Status: RESOLVED | Noted: 2024-03-13 | Resolved: 2024-03-13

## 2024-03-13 PROCEDURE — 1036F TOBACCO NON-USER: CPT | Performed by: INTERNAL MEDICINE

## 2024-03-13 PROCEDURE — 1160F RVW MEDS BY RX/DR IN RCRD: CPT | Performed by: INTERNAL MEDICINE

## 2024-03-13 PROCEDURE — 1123F ACP DISCUSS/DSCN MKR DOCD: CPT | Performed by: INTERNAL MEDICINE

## 2024-03-13 PROCEDURE — 99214 OFFICE O/P EST MOD 30 MIN: CPT | Performed by: INTERNAL MEDICINE

## 2024-03-13 PROCEDURE — 3080F DIAST BP >= 90 MM HG: CPT | Performed by: INTERNAL MEDICINE

## 2024-03-13 PROCEDURE — 3075F SYST BP GE 130 - 139MM HG: CPT | Performed by: INTERNAL MEDICINE

## 2024-03-13 PROCEDURE — 1159F MED LIST DOCD IN RCRD: CPT | Performed by: INTERNAL MEDICINE

## 2024-03-13 ASSESSMENT — ENCOUNTER SYMPTOMS
DIZZINESS: 0
NUMBNESS: 0
ADENOPATHY: 0
NERVOUS/ANXIOUS: 0
SORE THROAT: 0
COUGH: 0
HEADACHES: 0
HEMATURIA: 0
DYSURIA: 0
UNEXPECTED WEIGHT CHANGE: 0
SHORTNESS OF BREATH: 0
WHEEZING: 0
TROUBLE SWALLOWING: 0
VOICE CHANGE: 0
BRUISES/BLEEDS EASILY: 0
FEVER: 0
PALPITATIONS: 0
ROS GI COMMENTS: AS DETAILED ABOVE.
SEIZURES: 0
FATIGUE: 0
MYALGIAS: 0
CHILLS: 0
CONFUSION: 0
WEAKNESS: 0
ARTHRALGIAS: 0

## 2024-03-13 NOTE — LETTER
March 13, 2024     Rossi Steward, APRN-[facility]  [address]  [address]  [facility] [address]    Patient: Nadja Rodriguez   YOB: 1935   Date of Visit: 3/13/2024       Dear Dr. Rossi Steward, APRN-CNS:    Thank you for referring Nadja Rodriguez to me for evaluation. Below are my notes for this consultation.  If you have questions, please do not hesitate to call me. I look forward to following your patient along with you.       Sincerely,     Eder Kumar MD      CC: Tejas Zamudio, CRYS-CNP  ______________________________________________________________________________________              [facility] Gastroenterology    ASSESSMENT and PLAN:       Nadja Rodriguez is a 88 y.o. female with a significant past medical history of HTN, OA, osteoporosis, HLD, diverticulosis, and esophageal food impactions who presents for consultation requested by the ER providers (SAMUEL VillaCNP) for the evaluation of esophageal food impactions/dysphagia.       Dysphagia  History of dysphagia with multiple food impactions. EGDs in the past have not shown significant stricture and no evidence of EoE. Could consider underlying motility disorder. At this time she is asymptomatic and doing well with a modified diet. Will continue PPI and check XR esophagram. Pending results of esophagram could consider repeat EGD if a lesion that would benefit from endoscopic treatment is seen. Could consider manometry if esophagram is normal.  - continue PPI  - continue modified diet with soft/pureed diet  - XR esophagram        Follow up in 6 months.        Eder Kumar MD        Gastroenterology    [facility]    Clinical   [facility]        Subjective  HISTORY OF PRESENT ILLNESS:     Chief Complaint  Follow-up (ER 2/12/24)    History Of Present Illness:    Nadja Rodriguez is a 88 y.o. female with a significant past medical history of HTN,  OA, osteoporosis, HLD, diverticulosis, and esophageal food impactions who presents for consultation requested by the ER providers (LISA Villa) for the evaluation of esophageal food impactions/dysphagia.    She follows with CLAU Walsh as her PCP.     She has a history of multiple food impactions and EGDs at those times have not shown any stenosis/stricture or any evidence of EoE. She was recently seen in the ER on 2/12/2024 with another food impaction that resolved while she was in the ER.    Today she says that other than her recent ER visit she has not had any issues with dysphagia. She did stop eating meat and she has also been following a soft diet which has been helpful. No weight loss. She also denies any heartburn or acid reflux.      Patient denies any heartburn/GERD, N/V, odynophagia, abdominal pain, diarrhea, constipation, hematemesis, hematochezia, melena, or weight loss.    Endoscopy History:  1/24/2014 - Colonoscopy: diverticulosis  4/7/2019 - EGD: food impaction in the middle esophagus removed with tripod forceps and Mcallister net, slightly tortuous esophagus with no stenosis (normal squamous mucosa on path), small hiatal hernia, normal stomach, normal duodenum  6/20/2022 - EGD: food in the lower third of the esophagus removed with Mcallister net/tripod grasper/advancement into the stomach, small MW tear with oozing (clip placed), LA grade B esophagitis, no stenosis or stricture in the esophagus, normal stomach, normal duodenum  10/12/2022 - EGD: food in the lower third of the esophagus removed with rat-toothed forceps, LA grade C esophagitis, no stricture, normal stomach, normal duodenum      Review of systems:   Review of Systems   Constitutional:  Negative for chills, fatigue, fever and unexpected weight change.   HENT:  Negative for congestion, sneezing, sore throat, trouble swallowing and voice change.    Respiratory:  Negative for cough, shortness of breath and wheezing.     Cardiovascular:  Negative for chest pain, palpitations and leg swelling.   Gastrointestinal:         As detailed above.   Genitourinary:  Negative for dysuria and hematuria.   Musculoskeletal:  Negative for arthralgias and myalgias.   Skin:  Negative for pallor and rash.   Neurological:  Negative for dizziness, seizures, syncope, weakness, numbness and headaches.   Hematological:  Negative for adenopathy. Does not bruise/bleed easily.   Psychiatric/Behavioral:  Negative for confusion. The patient is not nervous/anxious.    All other systems reviewed and are negative.      I performed a complete 10 point review of systems and it is negative except as noted in HPI or above.      PAST HISTORIES:       Past Medical History:  She has a past medical history of Diverticulosis of intestine, part unspecified, without perforation or abscess without bleeding, Dysphagia (07/05/2023), Esophageal reflux (07/05/2023), Food impaction of esophagus (07/05/2023), Fracture of radius (04/19/2022), Hyperpigmented skin lesion (07/05/2023), Osteoporosis, Personal history of other diseases of the musculoskeletal system and connective tissue, Personal history of other endocrine, nutritional and metabolic disease, Personal history of other specified conditions, Radius fracture (03/13/2024), Rheumatic mitral valve disease, unspecified, and Ulna fracture (03/13/2024).    Past Surgical History:  She has a past surgical history that includes Other surgical history (06/04/2019); Other surgical history (06/04/2019); Hysterectomy; Breast lumpectomy; Cataract extraction; Colonoscopy; Upper gastrointestinal endoscopy; and Tonsillectomy.      Social History:  She reports that she has never smoked. She has never used smokeless tobacco. She reports that she does not drink alcohol and does not use drugs.    Family History:  No known GI disease, specifically denies pancreatitis, Crohn's, colon cancer, gastroesophageal cancer, or ulcerative  colitis.    Family History   Problem Relation Name Age of Onset   • Lung cancer Other          Allergies:  Aspirin, Bee venom protein (honey bee), Flu vac 2020 65up-[id number](pf), and Wheat      Objective  OBJECTIVE:       Last Recorded Vitals:  Vitals:    03/13/24 1112   BP: (!) 132/96   Pulse: 80   SpO2: 92%   Weight: 64.9 kg (143 lb)   Height: 1.524 m (5')     BP (!) 132/96   Pulse 80   Ht 1.524 m (5')   Wt 64.9 kg (143 lb)   SpO2 92%   BMI 27.93 kg/m²      Physical Exam:    Physical Exam  Vitals reviewed.   Constitutional:       General: She is not in acute distress.     Appearance: She is not ill-appearing.      Comments: thin   HENT:      Head: Normocephalic and atraumatic.   Eyes:      General: No scleral icterus.  Cardiovascular:      Rate and Rhythm: Normal rate and regular rhythm.      Pulses: Normal pulses.      Heart sounds: Normal heart sounds. No murmur heard.  Pulmonary:      Effort: Pulmonary effort is normal. No respiratory distress.      Breath sounds: Normal breath sounds. No wheezing.   Abdominal:      General: Bowel sounds are normal.      Palpations: Abdomen is soft.      Tenderness: There is no abdominal tenderness. There is no rebound.   Musculoskeletal:         General: No swelling or deformity.   Skin:     General: Skin is warm and dry.      Coloration: Skin is not jaundiced.      Findings: No rash.   Neurological:      General: No focal deficit present.      Mental Status: She is alert and oriented to person, place, and time.   Psychiatric:         Mood and Affect: Mood normal.         Behavior: Behavior normal.         Thought Content: Thought content normal.         Judgment: Judgment normal.         Home Medications:  Prior to Admission medications    Medication Sig Start Date End Date Taking? Authorizing Provider   acebutolol (Sectral) 200 mg capsule TAKE 1 CAPSULE BY MOUTH ONCE DAILY 1/5/24  Yes CRYS Walsh-CNS   acetaminophen (TylenoL) 325 mg capsule Take 1 capsule  (325 mg) by mouth every 4 hours if needed for mild pain (1 - 3) or moderate pain (4 - 6).   Yes Historical Provider, MD   cholecalciferol (Vitamin D-3) 125 MCG (5000 UT) capsule Take 1 capsule (125 mcg) by mouth 2 times a week.   Yes Historical Provider, MD   denosumab (Prolia) 60 mg/mL syringe INJECT 60MG SUBCUTANEOUSLY ONCE AND REPEAT EVERY 6 MONTHS 7/6/23  Yes CLAU Walsh   EPINEPHrine (EpiPen Jr) 0.15 mg/0.3 mL injection syringe Inject 0.3 mL (0.15 mg) as directed 1 time.   Yes Historical Provider, MD   fluticasone (Flonase) 50 mcg/actuation nasal spray Administer 1 spray into each nostril once daily. 7/6/23  Yes CLAU Walsh   furosemide (Lasix) 40 mg tablet Take 1 tablet (40 mg) by mouth once daily. 1/30/24  Yes CLAU Walsh   pantoprazole (ProtoNix) 40 mg EC tablet Take 1 tablet (40 mg) by mouth once daily in the morning. Take before meals. 7/6/23  Yes CLAU Walsh   potassium chloride ER (Micro-K) 10 mEq ER capsule Take 1 capsule (10 mEq) by mouth once daily. 1/30/24  Yes CLAU Walsh         Relevant Results Recent labs reviewed in the EMR.    Lab Results   Component Value Date/Time    WBC 7.1 01/30/2024 1138    HGB 14.9 01/30/2024 1138    HGB 13.5 07/10/2023 0710    HGB 14.1 10/12/2022 1446    HGB 14.0 06/20/2022 0716    MCV 90 01/30/2024 1138     01/30/2024 1138     07/10/2023 0710     10/12/2022 1446     06/20/2022 0716       Lab Results   Component Value Date/Time     01/30/2024 1138    K 3.8 01/30/2024 1138     01/30/2024 1138    BUN 11 01/30/2024 1138    CREATININE 0.59 01/30/2024 1138    CREATININE 0.53 07/10/2023 0710    CREATININE 0.48 (L) 10/12/2022 1446       Lab Results   Component Value Date/Time    BILITOT 0.5 01/30/2024 1138    BILITOT 0.5 07/10/2023 0710    BILITOT 0.7 10/12/2022 1446    BILITOT 0.8 06/20/2022 0716    ALKPHOS 71 01/30/2024 1138    ALKPHOS 138 (H)  "07/10/2023 0710    ALKPHOS 60 10/12/2022 1446    ALKPHOS 61 06/20/2022 0716    AST 25 01/30/2024 1138    AST 20 07/10/2023 0710    AST 32 10/12/2022 1446    AST 19 06/20/2022 0716    ALT 17 01/30/2024 1138    ALT 16 07/10/2023 0710    ALT 15 10/12/2022 1446    ALT 15 06/20/2022 0716       No results found for: \"CRP\", \"CALPS\"    Radiology: Imaging reviewed in the EMR.  XR DEXA bone density    Result Date: 3/6/2024  Interpreted By:  Teo Vides, STUDY: DEXA BONE DENSITY3/6/2024 9:29 am   INDICATION: Signs/Symptoms:evaluate prolia for osteoporosis. The patient is a 89 y/o  year old F.   COMPARISON: 11/23/2021   ACCESSION NUMBER(S): [id number]   ORDERING CLINICIAN: DINA OLVERA   TECHNIQUE: DEXA BONE DENSITY   FINDINGS: SPINE L1-L4 Bone Mineral Density: 0.655 T-Score -3.6  Z-Score -0.7 Bone Mineral Density change vs baseline (%): 3.7 Bone Mineral Density change vs previous (%): 3.7   LEFT FEMUR -TOTAL Bone Mineral Density: 0.559 T-Score -3.1   Z-Score  -0.8 Bone Mineral Density change vs baseline (%): -9.7 Bone Mineral Density change vs previous (%): -9.7   LEFT FEMUR -NECK Bone Mineral Density: 0.448 T-Score -3.6  Z-Score -1.1     World Health Organization (WHO) criteria for post-menopausal,  Women: Normal:         T-score at or above -1 SD Osteopenia:   T-score between -1 and -2.5 SD Osteoporosis: T-score at or below -2.5 SD     10-year Fracture Risk (%): Major Osteoporotic Fracture  26 Hip Fracture                        12   Note:  If no FRAX score is reported, it is because: Some T-score for Spine Total or Hip Total or Femoral Neck at or below -2.5   This exam was performed at [facility] on a [employer] Discovery C Dexa Unit.       DEXA:  According to World Health Organization criteria, classification is osteoporosis.   Followup recommended in two years or sooner as clinically warranted.   All images and detailed analysis are available on the  Radiology PACS.   MACRO: None   Signed by: " Teo Vides 3/6/2024 11:00 AM Dictation workstation:   [id number]

## 2024-03-13 NOTE — PROGRESS NOTES
[facility] Gastroenterology    ASSESSMENT and PLAN:       Nadja Rodriguez is a 88 y.o. female with a significant past medical history of HTN, OA, osteoporosis, HLD, diverticulosis, and esophageal food impactions who presents for consultation requested by the ER providers (LISA Villa) for the evaluation of esophageal food impactions/dysphagia.       Dysphagia  History of dysphagia with multiple food impactions. EGDs in the past have not shown significant stricture and no evidence of EoE. Could consider underlying motility disorder. At this time she is asymptomatic and doing well with a modified diet. Will continue PPI and check XR esophagram. Pending results of esophagram could consider repeat EGD if a lesion that would benefit from endoscopic treatment is seen. Could consider manometry if esophagram is normal.  - continue PPI  - continue modified diet with soft/pureed diet  - XR esophagram        Follow up in 6 months.        Eder Kumar MD        Gastroenterology    [facility]    Clinical   [facility]        Subjective   HISTORY OF PRESENT ILLNESS:     Chief Complaint  Follow-up (ER 2/12/24)    History Of Present Illness:    Nadja Rodriguez is a 88 y.o. female with a significant past medical history of HTN, OA, osteoporosis, HLD, diverticulosis, and esophageal food impactions who presents for consultation requested by the ER providers (LISA Villa) for the evaluation of esophageal food impactions/dysphagia.    She follows with CLAU Walsh as her PCP.     She has a history of multiple food impactions and EGDs at those times have not shown any stenosis/stricture or any evidence of EoE. She was recently seen in the ER on 2/12/2024 with another food impaction that resolved while she was in the ER.    Today she says that other than her recent ER visit she has not had any  issues with dysphagia. She did stop eating meat and she has also been following a soft diet which has been helpful. No weight loss. She also denies any heartburn or acid reflux.      Patient denies any heartburn/GERD, N/V, odynophagia, abdominal pain, diarrhea, constipation, hematemesis, hematochezia, melena, or weight loss.    Endoscopy History:  1/24/2014 - Colonoscopy: diverticulosis  4/7/2019 - EGD: food impaction in the middle esophagus removed with tripod forceps and Mcallister net, slightly tortuous esophagus with no stenosis (normal squamous mucosa on path), small hiatal hernia, normal stomach, normal duodenum  6/20/2022 - EGD: food in the lower third of the esophagus removed with Mcallister net/tripod grasper/advancement into the stomach, small MW tear with oozing (clip placed), LA grade B esophagitis, no stenosis or stricture in the esophagus, normal stomach, normal duodenum  10/12/2022 - EGD: food in the lower third of the esophagus removed with rat-toothed forceps, LA grade C esophagitis, no stricture, normal stomach, normal duodenum      Review of systems:   Review of Systems   Constitutional:  Negative for chills, fatigue, fever and unexpected weight change.   HENT:  Negative for congestion, sneezing, sore throat, trouble swallowing and voice change.    Respiratory:  Negative for cough, shortness of breath and wheezing.    Cardiovascular:  Negative for chest pain, palpitations and leg swelling.   Gastrointestinal:         As detailed above.   Genitourinary:  Negative for dysuria and hematuria.   Musculoskeletal:  Negative for arthralgias and myalgias.   Skin:  Negative for pallor and rash.   Neurological:  Negative for dizziness, seizures, syncope, weakness, numbness and headaches.   Hematological:  Negative for adenopathy. Does not bruise/bleed easily.   Psychiatric/Behavioral:  Negative for confusion. The patient is not nervous/anxious.    All other systems reviewed and are negative.      I performed a complete  10 point review of systems and it is negative except as noted in HPI or above.      PAST HISTORIES:       Past Medical History:  She has a past medical history of Diverticulosis of intestine, part unspecified, without perforation or abscess without bleeding, Dysphagia (07/05/2023), Esophageal reflux (07/05/2023), Food impaction of esophagus (07/05/2023), Fracture of radius (04/19/2022), Hyperpigmented skin lesion (07/05/2023), Osteoporosis, Personal history of other diseases of the musculoskeletal system and connective tissue, Personal history of other endocrine, nutritional and metabolic disease, Personal history of other specified conditions, Radius fracture (03/13/2024), Rheumatic mitral valve disease, unspecified, and Ulna fracture (03/13/2024).    Past Surgical History:  She has a past surgical history that includes Other surgical history (06/04/2019); Other surgical history (06/04/2019); Hysterectomy; Breast lumpectomy; Cataract extraction; Colonoscopy; Upper gastrointestinal endoscopy; and Tonsillectomy.      Social History:  She reports that she has never smoked. She has never used smokeless tobacco. She reports that she does not drink alcohol and does not use drugs.    Family History:  No known GI disease, specifically denies pancreatitis, Crohn's, colon cancer, gastroesophageal cancer, or ulcerative colitis.    Family History   Problem Relation Name Age of Onset    Lung cancer Other          Allergies:  Aspirin, Bee venom protein (honey bee), Flu vac 2020 65up-[id number](pf), and Wheat      Objective   OBJECTIVE:       Last Recorded Vitals:  Vitals:    03/13/24 1112   BP: (!) 132/96   Pulse: 80   SpO2: 92%   Weight: 64.9 kg (143 lb)   Height: 1.524 m (5')     BP (!) 132/96   Pulse 80   Ht 1.524 m (5')   Wt 64.9 kg (143 lb)   SpO2 92%   BMI 27.93 kg/m²      Physical Exam:    Physical Exam  Vitals reviewed.   Constitutional:       General: She is not in acute distress.     Appearance: She is not  ill-appearing.      Comments: thin   HENT:      Head: Normocephalic and atraumatic.   Eyes:      General: No scleral icterus.  Cardiovascular:      Rate and Rhythm: Normal rate and regular rhythm.      Pulses: Normal pulses.      Heart sounds: Normal heart sounds. No murmur heard.  Pulmonary:      Effort: Pulmonary effort is normal. No respiratory distress.      Breath sounds: Normal breath sounds. No wheezing.   Abdominal:      General: Bowel sounds are normal.      Palpations: Abdomen is soft.      Tenderness: There is no abdominal tenderness. There is no rebound.   Musculoskeletal:         General: No swelling or deformity.   Skin:     General: Skin is warm and dry.      Coloration: Skin is not jaundiced.      Findings: No rash.   Neurological:      General: No focal deficit present.      Mental Status: She is alert and oriented to person, place, and time.   Psychiatric:         Mood and Affect: Mood normal.         Behavior: Behavior normal.         Thought Content: Thought content normal.         Judgment: Judgment normal.         Home Medications:  Prior to Admission medications    Medication Sig Start Date End Date Taking? Authorizing Provider   acebutolol (Sectral) 200 mg capsule TAKE 1 CAPSULE BY MOUTH ONCE DAILY 1/5/24  Yes CLAU Walsh   acetaminophen (TylenoL) 325 mg capsule Take 1 capsule (325 mg) by mouth every 4 hours if needed for mild pain (1 - 3) or moderate pain (4 - 6).   Yes Historical Provider, MD   cholecalciferol (Vitamin D-3) 125 MCG (5000 UT) capsule Take 1 capsule (125 mcg) by mouth 2 times a week.   Yes Historical Provider, MD   denosumab (Prolia) 60 mg/mL syringe INJECT 60MG SUBCUTANEOUSLY ONCE AND REPEAT EVERY 6 MONTHS 7/6/23  Yes CLAU Walsh   EPINEPHrine (EpiPen Jr) 0.15 mg/0.3 mL injection syringe Inject 0.3 mL (0.15 mg) as directed 1 time.   Yes Historical Provider, MD   fluticasone (Flonase) 50 mcg/actuation nasal spray Administer 1 spray into each  "nostril once daily. 7/6/23  Yes CLAU Walsh   furosemide (Lasix) 40 mg tablet Take 1 tablet (40 mg) by mouth once daily. 1/30/24  Yes CLAU Walsh   pantoprazole (ProtoNix) 40 mg EC tablet Take 1 tablet (40 mg) by mouth once daily in the morning. Take before meals. 7/6/23  Yes CLAU Walsh   potassium chloride ER (Micro-K) 10 mEq ER capsule Take 1 capsule (10 mEq) by mouth once daily. 1/30/24  Yes CLAU Walsh         Relevant Results Recent labs reviewed in the EMR.    Lab Results   Component Value Date/Time    WBC 7.1 01/30/2024 1138    HGB 14.9 01/30/2024 1138    HGB 13.5 07/10/2023 0710    HGB 14.1 10/12/2022 1446    HGB 14.0 06/20/2022 0716    MCV 90 01/30/2024 1138     01/30/2024 1138     07/10/2023 0710     10/12/2022 1446     06/20/2022 0716       Lab Results   Component Value Date/Time     01/30/2024 1138    K 3.8 01/30/2024 1138     01/30/2024 1138    BUN 11 01/30/2024 1138    CREATININE 0.59 01/30/2024 1138    CREATININE 0.53 07/10/2023 0710    CREATININE 0.48 (L) 10/12/2022 1446       Lab Results   Component Value Date/Time    BILITOT 0.5 01/30/2024 1138    BILITOT 0.5 07/10/2023 0710    BILITOT 0.7 10/12/2022 1446    BILITOT 0.8 06/20/2022 0716    ALKPHOS 71 01/30/2024 1138    ALKPHOS 138 (H) 07/10/2023 0710    ALKPHOS 60 10/12/2022 1446    ALKPHOS 61 06/20/2022 0716    AST 25 01/30/2024 1138    AST 20 07/10/2023 0710    AST 32 10/12/2022 1446    AST 19 06/20/2022 0716    ALT 17 01/30/2024 1138    ALT 16 07/10/2023 0710    ALT 15 10/12/2022 1446    ALT 15 06/20/2022 0716       No results found for: \"CRP\", \"CALPS\"    Radiology: Imaging reviewed in the EMR.  XR DEXA bone density    Result Date: 3/6/2024  Interpreted By:  Teo Vides, STUDY: DEXA BONE DENSITY3/6/2024 9:29 am   INDICATION: Signs/Symptoms:evaluate prolia for osteoporosis. The patient is a 89 y/o  year old F.   COMPARISON: 11/23/2021   " ACCESSION NUMBER(S): [id number]   ORDERING CLINICIAN: DINA OLVERA   TECHNIQUE: DEXA BONE DENSITY   FINDINGS: SPINE L1-L4 Bone Mineral Density: 0.655 T-Score -3.6  Z-Score -0.7 Bone Mineral Density change vs baseline (%): 3.7 Bone Mineral Density change vs previous (%): 3.7   LEFT FEMUR -TOTAL Bone Mineral Density: 0.559 T-Score -3.1   Z-Score  -0.8 Bone Mineral Density change vs baseline (%): -9.7 Bone Mineral Density change vs previous (%): -9.7   LEFT FEMUR -NECK Bone Mineral Density: 0.448 T-Score -3.6  Z-Score -1.1     World Health Organization (WHO) criteria for post-menopausal,  Women: Normal:         T-score at or above -1 SD Osteopenia:   T-score between -1 and -2.5 SD Osteoporosis: T-score at or below -2.5 SD     10-year Fracture Risk (%): Major Osteoporotic Fracture  26 Hip Fracture                        12   Note:  If no FRAX score is reported, it is because: Some T-score for Spine Total or Hip Total or Femoral Neck at or below -2.5   This exam was performed at [facility] on a [employer] Discovery C Dexa Unit.       DEXA:  According to World Health Organization criteria, classification is osteoporosis.   Followup recommended in two years or sooner as clinically warranted.   All images and detailed analysis are available on the  Radiology PACS.   MACRO: None   Signed by: Teo Vides 3/6/2024 11:00 AM Dictation workstation:   [id number]

## 2024-03-13 NOTE — PATIENT INSTRUCTIONS
Continue Pantoprazole to block acid in your stomach.  You should take this medication every day.  Take it first thing in the morning about 30 minutes before breakfast.  If you have been prescribed this medicine twice daily you should take the second dose about 30 minutes before dinner.      I have ordered an X-ray to look at the esophagus.      Soft foods including liquids and purees are always going to be easier to tolerate and swallow. Tougher foods like meats are always going ot be more difficult and run the risk of becoming stuck in the esophagus. I would suggest that you avoid meats unless they are pureed.      Follow up in 6 months.

## 2024-06-27 DIAGNOSIS — I10 BENIGN ESSENTIAL HYPERTENSION: ICD-10-CM

## 2024-06-27 RX ORDER — ACEBUTOLOL HYDROCHLORIDE 200 MG/1
200 CAPSULE ORAL DAILY
Qty: 90 CAPSULE | Refills: 1 | Status: SHIPPED | OUTPATIENT
Start: 2024-06-27

## 2024-07-26 ENCOUNTER — OFFICE VISIT (OUTPATIENT)
Dept: PRIMARY CARE | Facility: CLINIC | Age: 89
End: 2024-07-26
Payer: MEDICARE

## 2024-07-26 ENCOUNTER — APPOINTMENT (OUTPATIENT)
Dept: LAB | Facility: LAB | Age: 89
End: 2024-07-26
Payer: MEDICARE

## 2024-07-26 ENCOUNTER — LAB (OUTPATIENT)
Dept: LAB | Facility: LAB | Age: 89
End: 2024-07-26
Payer: MEDICARE

## 2024-07-26 VITALS
RESPIRATION RATE: 12 BRPM | WEIGHT: 138 LBS | SYSTOLIC BLOOD PRESSURE: 144 MMHG | TEMPERATURE: 97.3 F | BODY MASS INDEX: 26.95 KG/M2 | DIASTOLIC BLOOD PRESSURE: 88 MMHG | OXYGEN SATURATION: 98 % | HEART RATE: 64 BPM

## 2024-07-26 DIAGNOSIS — M24.542 CONTRACTURE OF LEFT HAND: Primary | ICD-10-CM

## 2024-07-26 DIAGNOSIS — I10 BENIGN ESSENTIAL HYPERTENSION: ICD-10-CM

## 2024-07-26 DIAGNOSIS — M81.0 OSTEOPOROSIS WITHOUT CURRENT PATHOLOGICAL FRACTURE, UNSPECIFIED OSTEOPOROSIS TYPE: ICD-10-CM

## 2024-07-26 DIAGNOSIS — M81.8 OSTEOPOROSIS, IDIOPATHIC: ICD-10-CM

## 2024-07-26 DIAGNOSIS — M21.932 ACQUIRED DEFORMITY OF LEFT WRIST: ICD-10-CM

## 2024-07-26 DIAGNOSIS — K22.2 ESOPHAGEAL STRICTURE: ICD-10-CM

## 2024-07-26 DIAGNOSIS — J30.9 ALLERGIC RHINITIS, UNSPECIFIED SEASONALITY, UNSPECIFIED TRIGGER: ICD-10-CM

## 2024-07-26 DIAGNOSIS — M81.0 AGE-RELATED OSTEOPOROSIS WITHOUT CURRENT PATHOLOGICAL FRACTURE: ICD-10-CM

## 2024-07-26 DIAGNOSIS — M25.532 LEFT WRIST PAIN: ICD-10-CM

## 2024-07-26 DIAGNOSIS — E87.6 HYPOKALEMIA: ICD-10-CM

## 2024-07-26 LAB
25(OH)D3 SERPL-MCNC: 115 NG/ML (ref 30–100)
ANION GAP SERPL CALC-SCNC: 11 MMOL/L (ref 10–20)
BUN SERPL-MCNC: 13 MG/DL (ref 6–23)
CA-I BLD-SCNC: 1.22 MMOL/L (ref 1.1–1.33)
CALCIUM SERPL-MCNC: 9.1 MG/DL (ref 8.6–10.3)
CHLORIDE SERPL-SCNC: 105 MMOL/L (ref 98–107)
CO2 SERPL-SCNC: 27 MMOL/L (ref 21–32)
CREAT SERPL-MCNC: 0.55 MG/DL (ref 0.5–1.05)
EGFRCR SERPLBLD CKD-EPI 2021: 89 ML/MIN/1.73M*2
GLUCOSE SERPL-MCNC: 93 MG/DL (ref 74–99)
POTASSIUM SERPL-SCNC: 4.1 MMOL/L (ref 3.5–5.3)
SODIUM SERPL-SCNC: 139 MMOL/L (ref 136–145)

## 2024-07-26 PROCEDURE — 3079F DIAST BP 80-89 MM HG: CPT

## 2024-07-26 PROCEDURE — 82330 ASSAY OF CALCIUM: CPT

## 2024-07-26 PROCEDURE — 1160F RVW MEDS BY RX/DR IN RCRD: CPT

## 2024-07-26 PROCEDURE — 36415 COLL VENOUS BLD VENIPUNCTURE: CPT

## 2024-07-26 PROCEDURE — 3077F SYST BP >= 140 MM HG: CPT

## 2024-07-26 PROCEDURE — 99213 OFFICE O/P EST LOW 20 MIN: CPT

## 2024-07-26 PROCEDURE — 1125F AMNT PAIN NOTED PAIN PRSNT: CPT

## 2024-07-26 PROCEDURE — 1159F MED LIST DOCD IN RCRD: CPT

## 2024-07-26 PROCEDURE — 1123F ACP DISCUSS/DSCN MKR DOCD: CPT

## 2024-07-26 PROCEDURE — 80048 BASIC METABOLIC PNL TOTAL CA: CPT

## 2024-07-26 PROCEDURE — 82306 VITAMIN D 25 HYDROXY: CPT

## 2024-07-26 PROCEDURE — 1036F TOBACCO NON-USER: CPT

## 2024-07-26 RX ORDER — FLUTICASONE PROPIONATE 50 MCG
1 SPRAY, SUSPENSION (ML) NASAL DAILY
Qty: 16 G | Refills: 3 | Status: SHIPPED | OUTPATIENT
Start: 2024-07-26

## 2024-07-26 RX ORDER — ACEBUTOLOL HYDROCHLORIDE 200 MG/1
200 CAPSULE ORAL DAILY
Qty: 90 CAPSULE | Refills: 1 | Status: SHIPPED | OUTPATIENT
Start: 2024-07-26

## 2024-07-26 RX ORDER — POTASSIUM CHLORIDE 750 MG/1
10 CAPSULE, EXTENDED RELEASE ORAL DAILY
Qty: 90 CAPSULE | Refills: 3 | Status: SHIPPED | OUTPATIENT
Start: 2024-07-26

## 2024-07-26 RX ORDER — PANTOPRAZOLE SODIUM 40 MG/1
40 TABLET, DELAYED RELEASE ORAL
Qty: 90 TABLET | Refills: 3 | Status: SHIPPED | OUTPATIENT
Start: 2024-07-26

## 2024-07-26 RX ORDER — CHOLECALCIFEROL (VITAMIN D3) 125 MCG
125 CAPSULE ORAL 2 TIMES WEEKLY
Status: CANCELLED | OUTPATIENT
Start: 2024-07-29

## 2024-07-26 RX ORDER — DICLOFENAC SODIUM 10 MG/G
4 GEL TOPICAL 4 TIMES DAILY
Qty: 200 G | Refills: 11 | Status: SHIPPED | OUTPATIENT
Start: 2024-07-26

## 2024-07-26 RX ORDER — DICLOFENAC SODIUM 10 MG/G
4 GEL TOPICAL 4 TIMES DAILY
Qty: 100 G | Refills: 1 | Status: SHIPPED | OUTPATIENT
Start: 2024-07-26 | End: 2024-07-26

## 2024-07-26 RX ORDER — FUROSEMIDE 40 MG/1
40 TABLET ORAL DAILY
Qty: 90 TABLET | Refills: 3 | Status: SHIPPED | OUTPATIENT
Start: 2024-07-26

## 2024-07-26 SDOH — ECONOMIC STABILITY: FOOD INSECURITY: WITHIN THE PAST 12 MONTHS, THE FOOD YOU BOUGHT JUST DIDN'T LAST AND YOU DIDN'T HAVE MONEY TO GET MORE.: NEVER TRUE

## 2024-07-26 SDOH — ECONOMIC STABILITY: FOOD INSECURITY: WITHIN THE PAST 12 MONTHS, YOU WORRIED THAT YOUR FOOD WOULD RUN OUT BEFORE YOU GOT MONEY TO BUY MORE.: NEVER TRUE

## 2024-07-26 ASSESSMENT — ENCOUNTER SYMPTOMS
EYES NEGATIVE: 1
CARDIOVASCULAR NEGATIVE: 1
GASTROINTESTINAL NEGATIVE: 1
NEUROLOGICAL NEGATIVE: 1
HEMATOLOGIC/LYMPHATIC NEGATIVE: 1
RESPIRATORY NEGATIVE: 1
ENDOCRINE NEGATIVE: 1
MUSCULOSKELETAL NEGATIVE: 1
PSYCHIATRIC NEGATIVE: 1
CONSTITUTIONAL NEGATIVE: 1

## 2024-07-26 ASSESSMENT — LIFESTYLE VARIABLES
HOW OFTEN DO YOU HAVE SIX OR MORE DRINKS ON ONE OCCASION: NEVER
HOW MANY STANDARD DRINKS CONTAINING ALCOHOL DO YOU HAVE ON A TYPICAL DAY: PATIENT DOES NOT DRINK
AUDIT-C TOTAL SCORE: 0
HOW OFTEN DO YOU HAVE A DRINK CONTAINING ALCOHOL: NEVER
SKIP TO QUESTIONS 9-10: 1

## 2024-07-26 ASSESSMENT — PAIN SCALES - GENERAL: PAINLEVEL: 10-WORST PAIN EVER

## 2024-07-26 NOTE — PATIENT INSTRUCTIONS
Thank you for coming to see me today.  If you have any questions or concerns following our visit, please contact the office.  Phone: [phone]    Follow up with me in 6 months or in 1 year pending weather     1)  I am referring you to occupational therapy for left hand/wrist pain - please call [phone] to schedule an appointment or schedule at  on your way out    2) START diclofenac gel, apply to wrist at site of pain 4 times daily.     3) Get non-fasting labwork today.  The lab is down the [address]ley from our office.     4) STOP vitamin D supplement due to high vitamin D levels

## 2024-07-26 NOTE — PROGRESS NOTES
Subjective   Patient ID: Nadja Rodriguez is a 89 y.o. female who presents for 6 month follow up of and evaluation     Diet: Does her own cooking, cooking with microwave. Using microwave, crock pot and [employer] electric grill. Trouble using her left hand. Well balanced diet.   Exercise: Goes three times per week to [facility] to use the pool, water exercises  Weight: Stable  Water: Drinking several bottles per water  Sleep: Waking up twice per night to use bathroom, up at 5:30, remains on schedule despite being retired. Getting about 7-8 hours per night  Social: , lives alone. 2 adult children (3 have passed from cancer, MI, motor cycle accident) son lives in [place], identifies her daughter in law's mother as support source. 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    HENT: Negative.     Eyes: Negative.    Respiratory: Negative.     Cardiovascular: Negative.    Gastrointestinal: Negative.    Endocrine: Negative.    Genitourinary: Negative.    Musculoskeletal: Negative.    Skin: Negative.    Neurological: Negative.    Hematological: Negative.    Psychiatric/Behavioral: Negative.          Current Outpatient Medications   Medication Sig Dispense Refill    acetaminophen (TylenoL) 325 mg capsule Take 1 capsule (325 mg) by mouth every 4 hours if needed for mild pain (1 - 3) or moderate pain (4 - 6).      EPINEPHrine (EpiPen Jr) 0.15 mg/0.3 mL injection syringe Inject 0.3 mL (0.15 mg) as directed 1 time.      acebutolol (Sectral) 200 mg capsule Take 1 capsule (200 mg) by mouth once daily. 90 capsule 1    cholecalciferol (Vitamin D-3) 125 MCG (5000 UT) capsule Take 1 capsule (125 mcg) by mouth 2 times a week.      diclofenac sodium (Voltaren) 1 % gel Apply 4.5 inches (4 g) topically 4 times a day. 200 g 11    fluticasone (Flonase) 50 mcg/actuation nasal spray Administer 1 spray into each nostril once daily. 16 g 3    furosemide (Lasix) 40 mg tablet Take 1  tablet (40 mg) by mouth once daily. 90 tablet 3    pantoprazole (ProtoNix) 40 mg EC tablet Take 1 tablet (40 mg) by mouth once daily in the morning. Take before meals. 90 tablet 3    potassium chloride ER (Micro-K) 10 mEq ER capsule Take 1 capsule (10 mEq) by mouth once daily. 90 capsule 3     No current facility-administered medications for this visit.     Past Surgical History:   Procedure Laterality Date    BREAST LUMPECTOMY      CATARACT EXTRACTION      COLONOSCOPY      HYSTERECTOMY      OTHER SURGICAL HISTORY  06/04/2019    Hysterectomy    OTHER SURGICAL HISTORY  06/04/2019    Knee surgery    TONSILLECTOMY      UPPER GASTROINTESTINAL ENDOSCOPY       Family History   Problem Relation Name Age of Onset    Lung cancer Other        Social History     Tobacco Use    Smoking status: Never    Smokeless tobacco: Never   Vaping Use    Vaping status: Never Used   Substance Use Topics    Alcohol use: Never    Drug use: Never        Objective     Visit Vitals  /88 (BP Location: Right arm, Patient Position: Sitting)   Pulse 64   Temp 36.3 °C (97.3 °F) (Temporal)   Resp 12   Wt 62.6 kg (138 lb)   SpO2 98%   BMI 26.95 kg/m²   Smoking Status Never   BSA 1.63 m²        Physical Exam  Constitutional:       Appearance: Normal appearance.   HENT:      Head: Normocephalic and atraumatic.   Eyes:      Extraocular Movements: Extraocular movements intact.      Pupils: Pupils are equal, round, and reactive to light.   Cardiovascular:      Rate and Rhythm: Normal rate and regular rhythm.   Pulmonary:      Effort: Pulmonary effort is normal.      Breath sounds: Normal breath sounds.   Abdominal:      General: Abdomen is flat. Bowel sounds are normal.      Palpations: Abdomen is soft.   Musculoskeletal:         General: Normal range of motion.   Skin:     General: Skin is warm and dry.      Capillary Refill: Capillary refill takes less than 2 seconds.   Neurological:      General: No focal deficit present.      Mental Status: She is  alert and oriented to person, place, and time.   Psychiatric:         Mood and Affect: Mood normal.         Behavior: Behavior normal.           Assessment/Plan   Problem List Items Addressed This Visit       Benign essential hypertension    Relevant Medications    furosemide (Lasix) 40 mg tablet    acebutolol (Sectral) 200 mg capsule    Allergic rhinitis    Relevant Medications    fluticasone (Flonase) 50 mcg/actuation nasal spray    Osteoporosis     Has had one prolia injection, states her arm hurt for 4 months after injection  Not interested in returning for repeat injection as she only has one good hand    Defer further prolia injections  Discussed risk of fracture and treatment cessation, patient aware of risks         Hypokalemia    Relevant Medications    potassium chloride ER (Micro-K) 10 mEq ER capsule    Acquired deformity of left wrist     Significant pain in lateral edge of her left wrist, following with ortho who says they can't perform any more injections on it. Recommended for wrist surgery but feels she may be too old  Maintained on ibuprofen daily  Unable to zip her coat or use her hand meaningfully  Crepitus present in wrist on passive motion during evaluation    Continue ibuprofen/acetaminophen  Start diclofenac gel QID  Refer to occupational therapy stretching/strengthening          Other Visit Diagnoses       Contracture of left hand    -  Primary    Relevant Medications    diclofenac sodium (Voltaren) 1 % gel    Other Relevant Orders    Referral to Occupational Therapy    Osteoporosis, idiopathic        Esophageal stricture        Relevant Medications    pantoprazole (ProtoNix) 40 mg EC tablet    Left wrist pain        Relevant Medications    diclofenac sodium (Voltaren) 1 % gel            All pertinent lab work and results were reviewed with patient.     Follow up with me in 6-12 months    Rossi Steward, CRYS-CNS

## 2024-07-26 NOTE — ASSESSMENT & PLAN NOTE
Has had one prolia injection, states her arm hurt for 4 months after injection  Not interested in returning for repeat injection as she only has one good hand    Defer further prolia injections  Discussed risk of fracture and treatment cessation, patient aware of risks

## 2024-07-26 NOTE — ASSESSMENT & PLAN NOTE
Significant pain in lateral edge of her left wrist, following with ortho who says they can't perform any more injections on it. Recommended for wrist surgery but feels she may be too old  Maintained on ibuprofen daily  Unable to zip her coat or use her hand meaningfully  Crepitus present in wrist on passive motion during evaluation    Continue ibuprofen/acetaminophen  Start diclofenac gel QID  Refer to occupational therapy stretching/strengthening

## 2024-07-31 ENCOUNTER — APPOINTMENT (OUTPATIENT)
Dept: PRIMARY CARE | Facility: CLINIC | Age: 89
End: 2024-07-31
Payer: MEDICARE

## 2024-08-01 ENCOUNTER — APPOINTMENT (OUTPATIENT)
Dept: OCCUPATIONAL THERAPY | Facility: HOSPITAL | Age: 89
End: 2024-08-01
Payer: MEDICARE

## 2024-08-16 ENCOUNTER — APPOINTMENT (OUTPATIENT)
Dept: INFUSION THERAPY | Facility: HOSPITAL | Age: 89
End: 2024-08-16
Payer: MEDICARE

## 2024-09-04 ENCOUNTER — APPOINTMENT (OUTPATIENT)
Dept: RADIOLOGY | Facility: HOSPITAL | Age: 89
End: 2024-09-04
Payer: MEDICARE

## 2024-09-27 ENCOUNTER — OFFICE VISIT (OUTPATIENT)
Dept: PRIMARY CARE | Facility: CLINIC | Age: 89
End: 2024-09-27
Payer: MEDICARE

## 2024-09-27 VITALS
SYSTOLIC BLOOD PRESSURE: 148 MMHG | OXYGEN SATURATION: 96 % | BODY MASS INDEX: 27.09 KG/M2 | DIASTOLIC BLOOD PRESSURE: 91 MMHG | HEART RATE: 60 BPM | RESPIRATION RATE: 16 BRPM | WEIGHT: 138 LBS | TEMPERATURE: 97.5 F | HEIGHT: 60 IN

## 2024-09-27 DIAGNOSIS — M62.838 MUSCLE SPASTICITY: ICD-10-CM

## 2024-09-27 DIAGNOSIS — M24.542 CONTRACTURE OF LEFT HAND: Primary | ICD-10-CM

## 2024-09-27 PROCEDURE — 1123F ACP DISCUSS/DSCN MKR DOCD: CPT

## 2024-09-27 PROCEDURE — 1125F AMNT PAIN NOTED PAIN PRSNT: CPT

## 2024-09-27 PROCEDURE — 1158F ADVNC CARE PLAN TLK DOCD: CPT

## 2024-09-27 PROCEDURE — 3080F DIAST BP >= 90 MM HG: CPT

## 2024-09-27 PROCEDURE — 99212 OFFICE O/P EST SF 10 MIN: CPT

## 2024-09-27 PROCEDURE — 3077F SYST BP >= 140 MM HG: CPT

## 2024-09-27 PROCEDURE — 1159F MED LIST DOCD IN RCRD: CPT

## 2024-09-27 RX ORDER — NYSTATIN 100000 [USP'U]/G
1 POWDER TOPICAL 2 TIMES DAILY
Qty: 30 G | Refills: 2 | Status: SHIPPED | OUTPATIENT
Start: 2024-09-27 | End: 2025-09-27

## 2024-09-27 RX ORDER — BACLOFEN 10 MG/1
10 TABLET ORAL 2 TIMES DAILY
Qty: 60 TABLET | Refills: 5 | Status: SHIPPED | OUTPATIENT
Start: 2024-09-27 | End: 2025-03-26

## 2024-09-27 ASSESSMENT — COLUMBIA-SUICIDE SEVERITY RATING SCALE - C-SSRS
6. HAVE YOU EVER DONE ANYTHING, STARTED TO DO ANYTHING, OR PREPARED TO DO ANYTHING TO END YOUR LIFE?: NO
2. HAVE YOU ACTUALLY HAD ANY THOUGHTS OF KILLING YOURSELF?: NO
1. IN THE PAST MONTH, HAVE YOU WISHED YOU WERE DEAD OR WISHED YOU COULD GO TO SLEEP AND NOT WAKE UP?: NO

## 2024-09-27 ASSESSMENT — ENCOUNTER SYMPTOMS
DEPRESSION: 0
LOSS OF SENSATION IN FEET: 0
OCCASIONAL FEELINGS OF UNSTEADINESS: 0

## 2024-09-27 ASSESSMENT — PAIN SCALES - GENERAL: PAINLEVEL: 10-WORST PAIN EVER

## 2024-09-27 NOTE — PATIENT INSTRUCTIONS
Thank you for coming to see me today.  If you have any questions or concerns following our visit, please contact the office.  Phone: [phone]    Follow up with me in 3 months or sooner as needed  Call as needed for nail trimming    1)  START baclofen 10mg up to three times daily as needed for hand spasticity.  Use dry washcloth rolled up to hold at all times to help with spasticity and skin care    2) START nystatin powder twice daily to hand to help treat and prevent fungal infection    3) I am referring you to ortho hand for evaluation and discussion of treatment options for hand spasticity

## 2024-09-27 NOTE — PROGRESS NOTES
Subjective   Patient ID: Nadja Rodriguez is a 89 y.o. female who presents for evaluation of hand spasticity and nail trimming.    Reports spasticity in her hands preventing her from trimming her nails. Lives alone, home care will not trim nails. Needing to use moleskin pad to cushion sharp nails from digging into the palm of her hands.    Review of Systems   Constitutional: Negative.    HENT: Negative.     Eyes: Negative.    Respiratory: Negative.     Cardiovascular: Negative.    Gastrointestinal: Negative.    Endocrine: Negative.    Genitourinary: Negative.    Musculoskeletal: Negative.    Skin: Negative.    Neurological: Negative.    Hematological: Negative.    Psychiatric/Behavioral: Negative.          Current Outpatient Medications   Medication Sig Dispense Refill    acebutolol (Sectral) 200 mg capsule Take 1 capsule (200 mg) by mouth once daily. 90 capsule 1    acetaminophen (TylenoL) 325 mg capsule Take 1 capsule (325 mg) by mouth every 4 hours if needed for mild pain (1 - 3) or moderate pain (4 - 6).      cholecalciferol (Vitamin D-3) 125 MCG (5000 UT) capsule Take 1 capsule (125 mcg) by mouth 2 times a week.      diclofenac sodium (Voltaren) 1 % gel Apply 4.5 inches (4 g) topically 4 times a day. 200 g 11    EPINEPHrine (EpiPen Jr) 0.15 mg/0.3 mL injection syringe Inject 0.3 mL (0.15 mg) as directed 1 time.      fluticasone (Flonase) 50 mcg/actuation nasal spray Administer 1 spray into each nostril once daily. 16 g 3    furosemide (Lasix) 40 mg tablet Take 1 tablet (40 mg) by mouth once daily. 90 tablet 3    pantoprazole (ProtoNix) 40 mg EC tablet Take 1 tablet (40 mg) by mouth once daily in the morning. Take before meals. 90 tablet 3    potassium chloride ER (Micro-K) 10 mEq ER capsule Take 1 capsule (10 mEq) by mouth once daily. 90 capsule 3    baclofen (Lioresal) 10 mg tablet Take 1 tablet (10 mg) by mouth 2 times a day. 60 tablet 5    nystatin (Mycostatin) 100,000 unit/gram powder Apply 1 Application  topically 2 times a day. 30 g 2     No current facility-administered medications for this visit.     Past Surgical History:   Procedure Laterality Date    BREAST LUMPECTOMY      CATARACT EXTRACTION      COLONOSCOPY      HYSTERECTOMY      OTHER SURGICAL HISTORY  06/04/2019    Hysterectomy    OTHER SURGICAL HISTORY  06/04/2019    Knee surgery    TONSILLECTOMY      UPPER GASTROINTESTINAL ENDOSCOPY       Family History   Problem Relation Name Age of Onset    Lung cancer Other        Social History     Tobacco Use    Smoking status: Never    Smokeless tobacco: Never   Vaping Use    Vaping status: Never Used   Substance Use Topics    Alcohol use: Never    Drug use: Never        Objective     Visit Vitals  BP (!) 148/91 (BP Location: Right arm, Patient Position: Sitting, BP Cuff Size: Adult) Comment: Patient in significant pain   Pulse 60   Temp 36.4 °C (97.5 °F) (Temporal)   Resp 16   Ht 1.524 m (5')   Wt 62.6 kg (138 lb)   SpO2 96%   BMI 26.95 kg/m²   Smoking Status Never   BSA 1.63 m²        Physical Exam  Constitutional:       Appearance: Normal appearance.   HENT:      Head: Normocephalic and atraumatic.   Eyes:      Extraocular Movements: Extraocular movements intact.      Pupils: Pupils are equal, round, and reactive to light.   Pulmonary:      Effort: Pulmonary effort is normal.   Musculoskeletal:         General: Normal range of motion.   Skin:     General: Skin is warm and dry.      Capillary Refill: Capillary refill takes less than 2 seconds.   Neurological:      General: No focal deficit present.      Mental Status: She is alert and oriented to person, place, and time.   Psychiatric:         Mood and Affect: Mood normal.         Behavior: Behavior normal.           Assessment/Plan   Problem List Items Addressed This Visit       Muscle spasticity     Fingernails on bilateral hands trimmed in office today with 1 mm remaining white nail on each finger. Trimmed with surgical scissors    Advised to wash hands with  antibacterial soap, apply antifungal powder to hand and finger folds to treat fungal infection  Advised to return to office as needed for nail trimming to be performed by provider         Relevant Medications    baclofen (Lioresal) 10 mg tablet     Other Visit Diagnoses       Contracture of left hand    -  Primary    Relevant Medications    baclofen (Lioresal) 10 mg tablet    nystatin (Mycostatin) 100,000 unit/gram powder    Other Relevant Orders    Referral to Orthopaedic Surgery              All pertinent lab work and results were reviewed with patient.     Follow up in January or sooner as needed    CRYS Walsh-CNS

## 2024-10-03 PROBLEM — M62.838 MUSCLE SPASTICITY: Status: ACTIVE | Noted: 2024-10-03

## 2024-10-03 ASSESSMENT — ENCOUNTER SYMPTOMS
RESPIRATORY NEGATIVE: 1
CARDIOVASCULAR NEGATIVE: 1
GASTROINTESTINAL NEGATIVE: 1
CONSTITUTIONAL NEGATIVE: 1
MUSCULOSKELETAL NEGATIVE: 1
ENDOCRINE NEGATIVE: 1
EYES NEGATIVE: 1
PSYCHIATRIC NEGATIVE: 1
HEMATOLOGIC/LYMPHATIC NEGATIVE: 1
NEUROLOGICAL NEGATIVE: 1

## 2024-10-04 NOTE — PROGRESS NOTES
Subjective   Patient ID: Nadja Rodriguez is a 88 y.o. female who presents for medicare wellness visit.    Home blood pressures running 120/80s.     Diet: Does her own cooking, cooking with microwave. Using microwave, crock pot and [employer] electric grill. Trouble using her left hand. Well balanced diet.   Exercise: Goes three times per week to [facility] to use the pool, water exercises  Weight: Stable  Water: Drinking several bottles per water  Sleep: Waking up twice per night to use bathroom, up at 5:30, remains on schedule despite being retired. Getting about 7-8 hours per night  Social: , lives alone. 2 adult children (3 have passed from cancer, MI, motor cycle accident) son lives in [place], identifies her daughter in law's mother as support source. 1 [facility]  Professional: Retired [employer]    Review of Systems   Constitutional: Negative.    HENT: Negative.     Eyes: Negative.    Respiratory: Negative.     Cardiovascular: Negative.    Gastrointestinal: Negative.    Endocrine: Negative.    Genitourinary: Negative.    Musculoskeletal: Negative.    Skin: Negative.    Neurological: Negative.    Hematological: Negative.    Psychiatric/Behavioral: Negative.          Current Outpatient Medications   Medication Sig Dispense Refill    acebutolol (Sectral) 200 mg capsule TAKE 1 CAPSULE BY MOUTH ONCE DAILY 90 capsule 1    acetaminophen (TylenoL) 325 mg capsule Take 1 capsule (325 mg) by mouth every 4 hours if needed for mild pain (1 - 3) or moderate pain (4 - 6).      cholecalciferol (Vitamin D-3) 125 MCG (5000 UT) capsule Take 1 capsule (125 mcg) by mouth once daily.      EPINEPHrine (EpiPen Jr) 0.15 mg/0.3 mL injection syringe Inject 0.3 mL (0.15 mg) as directed 1 time.      fluticasone (Flonase) 50 mcg/actuation nasal spray Administer 1 spray into each nostril once daily. 16 g 3    denosumab (Prolia) 60 mg/mL syringe INJECT 60MG SUBCUTANEOUSLY ONCE AND REPEAT EVERY 6 MONTHS (Patient not taking:  Reported on 1/30/2024) 1 mL 1    furosemide (Lasix) 40 mg tablet Take 1 tablet (40 mg) by mouth once daily. 90 tablet 3    pantoprazole (ProtoNix) 40 mg EC tablet Take 1 tablet (40 mg) by mouth once daily in the morning. Take before meals. (Patient not taking: Reported on 1/30/2024) 90 tablet 3    potassium chloride ER (Micro-K) 10 mEq ER capsule Take 1 capsule (10 mEq) by mouth once daily. 90 capsule 3     No current facility-administered medications for this visit.     Past Surgical History:   Procedure Laterality Date    OTHER SURGICAL HISTORY  06/04/2019    Hysterectomy    OTHER SURGICAL HISTORY  06/04/2019    Knee surgery     Family History   Problem Relation Name Age of Onset    Lung cancer Other        Social History     Tobacco Use    Smoking status: Never    Smokeless tobacco: Never   Vaping Use    Vaping Use: Never used   Substance Use Topics    Alcohol use: Never    Drug use: Never        Objective     Visit Vitals  /82 (BP Location: Right arm, Patient Position: Sitting, BP Cuff Size: Adult)   Pulse 77   Temp 36 °C (96.8 °F)   Resp 18   Ht 1.524 m (5')   Wt 64.2 kg (141 lb 8 oz)   SpO2 96%   BMI 27.63 kg/m²   Smoking Status Never   BSA 1.65 m²        Physical Exam      Assessment/Plan   Problem List Items Addressed This Visit       Benign essential hypertension     Mildly hypertensive in office at 137/82  Home blood pressures running 120/80s    Continue acebutolol 200mg daily         Relevant Medications    furosemide (Lasix) 40 mg tablet    Other Relevant Orders    Follow Up In Primary Care - Established    Osteoporosis - Primary    Relevant Orders    Calcium, ionized    Vitamin D 25-Hydroxy,Total (for eval of Vitamin D levels)    XR DEXA bone density    Encounter for Medicare annual wellness exam     Wellness screenings/Immunizations:  Flu vaccination: Recommended annually  PCV: PCV 20 recommended and given in office today  PPSV: Deferred  Shingrix vaccine: Deferred  Colon cancer screening: No  longer indicated for preventative screening due to age  Mammogram: No longer indicated for preventative screening due to age  DEXA scan: Recommended and ordered, did not go to infusion center to have Prolia as recommended at last visit            Other Visit Diagnoses       Hypokalemia        Relevant Medications    potassium chloride ER (Micro-K) 10 mEq ER capsule    Other Relevant Orders    CBC    Comprehensive Metabolic Panel    TSH with reflex to Free T4 if abnormal    Follow Up In Primary Care - Established    Routine general medical examination at health care facility        Need for pneumococcal vaccination        Relevant Orders    Pneumococcal conjugate vaccine, 20-valent (PREVNAR 20) (Completed)    Osteoporosis, idiopathic        Relevant Orders    TSH with reflex to Free T4 if abnormal    Follow Up In Primary Care - Established              All pertinent lab work and results were reviewed with patient.     Follow up with me in 6 months    Rossi Steward, APRN-CNS   No

## 2024-10-04 NOTE — ASSESSMENT & PLAN NOTE
Fingernails on bilateral hands trimmed in office today with 1 mm remaining white nail on each finger. Trimmed with surgical scissors    Advised to wash hands with antibacterial soap, apply antifungal powder to hand and finger folds to treat fungal infection  Advised to return to office as needed for nail trimming to be performed by provider

## 2024-11-05 ENCOUNTER — APPOINTMENT (OUTPATIENT)
Dept: PRIMARY CARE | Facility: CLINIC | Age: 89
End: 2024-11-05
Payer: MEDICARE

## 2024-11-05 DIAGNOSIS — M21.932 ACQUIRED DEFORMITY OF LEFT WRIST: Primary | ICD-10-CM

## 2024-11-05 PROCEDURE — 1123F ACP DISCUSS/DSCN MKR DOCD: CPT

## 2024-11-05 NOTE — PATIENT INSTRUCTIONS
1) I am referring you to orthopedic hand surgery with Dr. Ludwin Austin at the [facility] for second opinion on left wrist contracture/deformity    2) Take baclofen 10mg at night for spasticity of your hand or during the day if you're not planning to drive

## 2024-11-05 NOTE — PROGRESS NOTES
Finger nail trimming procedure    Procedure Details   The risks (including bleeding and infection) and benefits of the   procedure and Verbal informed consent obtained.    Fingernails of left and right hands trimmed with 1mm of white nail remaining. The patient tolerated the procedure well. No complications, cuts, bleeding appreciated.     Complications:  none.      Did have concerns about spasticity of her left hand. Requesting pain medications; not taking baclofen as she was told this medication can make her dizzy and tired. Discussed with her that opiate or opiate derivatives would likely not help her pain as it appears to be s/t spasticity of the muscle in her forearm. Recommending she take baclofen at night to help with spasticity/pain.  Was seen by [facility] hand ortho in October who noted that patient is declining surgical intervention, bracing, etc.  Patient referred to Dr. Ludwin Austin in ortho hand at [facility] for second opinion.      Rossi SPANGLER-CNS

## 2024-11-06 ENCOUNTER — TELEPHONE (OUTPATIENT)
Dept: PRIMARY CARE | Facility: CLINIC | Age: 89
End: 2024-11-06
Payer: MEDICARE

## 2024-11-06 NOTE — TELEPHONE ENCOUNTER
When patient was on the phone she told me that she refuses to take baclofen because her physical therapy told her not to take it. She says she drives around too much also.

## 2024-11-06 NOTE — TELEPHONE ENCOUNTER
No, we discussed that we were not going to start pain medications as I feel they aren't going to help. I advised she take her baclofen at bedtime to help with spasm in her wrist. This was clearly noted on her post visit summary yesterday. I also recommend evaluation of her wrist by Dr. Austin in ortho hand.

## 2024-11-06 NOTE — TELEPHONE ENCOUNTER
Patient called in stating that she was supposed to have something sent in for pain, and there isnt anything at her pharmacy    Pharmacy: [facility], [place]

## 2024-11-07 NOTE — TELEPHONE ENCOUNTER
We discussed these things in office. The only thing that will help her pain is to have a brace, use a muscle relaxer at bedtime when she won't be driving or see another hand doctor to see if there is anything they can do to fix it. Will not prescribe pain meds as they will not help her.

## 2024-11-11 ENCOUNTER — TELEPHONE (OUTPATIENT)
Dept: PRIMARY CARE | Facility: CLINIC | Age: 89
End: 2024-11-11
Payer: MEDICARE

## 2024-11-11 NOTE — TELEPHONE ENCOUNTER
Spoke with patient advised to wear brace and PCP is offering a prescription for muscle relaxer to take at night. She informed will no longer seek care from PCP will be seeing a specialists with [facility]. She has been wearing a brace and it's not much help. She no longer wishes to receive medical services or be advised further. Stated she will not be returning to our office moving forward.

## 2024-11-25 ENCOUNTER — APPOINTMENT (OUTPATIENT)
Dept: ORTHOPEDIC SURGERY | Facility: HOSPITAL | Age: 89
End: 2024-11-25
Payer: MEDICARE

## 2025-01-27 ENCOUNTER — APPOINTMENT (OUTPATIENT)
Dept: PRIMARY CARE | Facility: CLINIC | Age: 89
End: 2025-01-27
Payer: MEDICARE

## 2025-06-08 ENCOUNTER — APPOINTMENT (OUTPATIENT)
Dept: RADIOLOGY | Facility: HOSPITAL | Age: 89
End: 2025-06-08
Payer: MEDICARE

## 2025-06-08 ENCOUNTER — PHARMACY VISIT (OUTPATIENT)
Dept: PHARMACY | Facility: CLINIC | Age: 89
End: 2025-06-08
Payer: COMMERCIAL

## 2025-06-08 ENCOUNTER — HOSPITAL ENCOUNTER (EMERGENCY)
Facility: HOSPITAL | Age: 89
Discharge: HOME | End: 2025-06-08
Attending: STUDENT IN AN ORGANIZED HEALTH CARE EDUCATION/TRAINING PROGRAM
Payer: MEDICARE

## 2025-06-08 VITALS
DIASTOLIC BLOOD PRESSURE: 99 MMHG | HEIGHT: 60 IN | WEIGHT: 130 LBS | BODY MASS INDEX: 25.52 KG/M2 | TEMPERATURE: 97.5 F | SYSTOLIC BLOOD PRESSURE: 156 MMHG | HEART RATE: 90 BPM | RESPIRATION RATE: 16 BRPM | OXYGEN SATURATION: 95 %

## 2025-06-08 DIAGNOSIS — E87.6 HYPOKALEMIA: ICD-10-CM
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
## 2025-06-08 DIAGNOSIS — M79.18 MUSCULOSKELETAL PAIN: ICD-10-CM

## 2025-06-08 DIAGNOSIS — N39.0 URINARY TRACT INFECTION WITHOUT HEMATURIA, SITE UNSPECIFIED: ICD-10-CM

## 2025-06-08 LAB
ALBUMIN SERPL BCP-MCNC: 4 G/DL (ref 3.4–5)
ALP SERPL-CCNC: 71 U/L (ref 33–136)
ALT SERPL W P-5'-P-CCNC: 16 U/L (ref 7–45)
ANION GAP SERPL CALC-SCNC: 10 MMOL/L (ref 10–20)
APPEARANCE UR: ABNORMAL
AST SERPL W P-5'-P-CCNC: 23 U/L (ref 9–39)
BACTERIA #/AREA URNS AUTO: ABNORMAL /HPF
BASOPHILS # BLD AUTO: 0.03 X10*3/UL (ref 0–0.1)
BASOPHILS NFR BLD AUTO: 0.4 %
BILIRUB SERPL-MCNC: 0.6 MG/DL (ref 0–1.2)
BILIRUB UR STRIP.AUTO-MCNC: NEGATIVE MG/DL
BUN SERPL-MCNC: 12 MG/DL (ref 6–23)
CALCIUM SERPL-MCNC: 9.3 MG/DL (ref 8.6–10.3)
CHLORIDE SERPL-SCNC: 106 MMOL/L (ref 98–107)
CO2 SERPL-SCNC: 27 MMOL/L (ref 21–32)
COLOR UR: ABNORMAL
CREAT SERPL-MCNC: 0.49 MG/DL (ref 0.5–1.05)
EGFRCR SERPLBLD CKD-EPI 2021: >90 ML/MIN/1.73M*2
EOSINOPHIL # BLD AUTO: 0.13 X10*3/UL (ref 0–0.4)
EOSINOPHIL NFR BLD AUTO: 1.9 %
ERYTHROCYTE [DISTWIDTH] IN BLOOD BY AUTOMATED COUNT: 13.3 % (ref 11.5–14.5)
GLUCOSE SERPL-MCNC: 94 MG/DL (ref 74–99)
GLUCOSE UR STRIP.AUTO-MCNC: NORMAL MG/DL
HCT VFR BLD AUTO: 42.4 % (ref 36–46)
HGB BLD-MCNC: 14.1 G/DL (ref 12–16)
HOLD SPECIMEN: NORMAL
IMM GRANULOCYTES # BLD AUTO: 0.02 X10*3/UL (ref 0–0.5)
IMM GRANULOCYTES NFR BLD AUTO: 0.3 % (ref 0–0.9)
KETONES UR STRIP.AUTO-MCNC: NEGATIVE MG/DL
LEUKOCYTE ESTERASE UR QL STRIP.AUTO: ABNORMAL
LYMPHOCYTES # BLD AUTO: 1.41 X10*3/UL (ref 0.8–3)
LYMPHOCYTES NFR BLD AUTO: 20.6 %
MCH RBC QN AUTO: 28.7 PG (ref 26–34)
MCHC RBC AUTO-ENTMCNC: 33.3 G/DL (ref 32–36)
MCV RBC AUTO: 86 FL (ref 80–100)
MONOCYTES # BLD AUTO: 0.38 X10*3/UL (ref 0.05–0.8)
MONOCYTES NFR BLD AUTO: 5.5 %
NEUTROPHILS # BLD AUTO: 4.88 X10*3/UL (ref 1.6–5.5)
NEUTROPHILS NFR BLD AUTO: 71.3 %
NITRITE UR QL STRIP.AUTO: ABNORMAL
NRBC BLD-RTO: 0 /100 WBCS (ref 0–0)
PH UR STRIP.AUTO: 6.5 [PH]
PLATELET # BLD AUTO: 218 X10*3/UL (ref 150–450)
POTASSIUM SERPL-SCNC: 3.2 MMOL/L (ref 3.5–5.3)
PROT SERPL-MCNC: 6.3 G/DL (ref 6.4–8.2)
PROT UR STRIP.AUTO-MCNC: NEGATIVE MG/DL
RBC # BLD AUTO: 4.91 X10*6/UL (ref 4–5.2)
RBC # UR STRIP.AUTO: NEGATIVE MG/DL
RBC #/AREA URNS AUTO: ABNORMAL /HPF
SODIUM SERPL-SCNC: 140 MMOL/L (ref 136–145)
SP GR UR STRIP.AUTO: 1.01
UROBILINOGEN UR STRIP.AUTO-MCNC: NORMAL MG/DL
WBC # BLD AUTO: 6.9 X10*3/UL (ref 4.4–11.3)
WBC #/AREA URNS AUTO: ABNORMAL /HPF

## 2025-06-08 PROCEDURE — 72125 CT NECK SPINE W/O DYE: CPT

## 2025-06-08 PROCEDURE — 73564 X-RAY EXAM KNEE 4 OR MORE: CPT | Mod: LT

## 2025-06-08 PROCEDURE — RXMED WILLOW AMBULATORY MEDICATION CHARGE

## 2025-06-08 PROCEDURE — 70450 CT HEAD/BRAIN W/O DYE: CPT

## 2025-06-08 PROCEDURE — 72131 CT LUMBAR SPINE W/O DYE: CPT | Performed by: SURGERY

## 2025-06-08 PROCEDURE — 84075 ASSAY ALKALINE PHOSPHATASE: CPT | Performed by: STUDENT IN AN ORGANIZED HEALTH CARE EDUCATION/TRAINING PROGRAM

## 2025-06-08 PROCEDURE — 72125 CT NECK SPINE W/O DYE: CPT | Performed by: SURGERY

## 2025-06-08 PROCEDURE — 87086 URINE CULTURE/COLONY COUNT: CPT | Mod: PORLAB | Performed by: STUDENT IN AN ORGANIZED HEALTH CARE EDUCATION/TRAINING PROGRAM

## 2025-06-08 PROCEDURE — 72128 CT CHEST SPINE W/O DYE: CPT

## 2025-06-08 PROCEDURE — 70450 CT HEAD/BRAIN W/O DYE: CPT | Performed by: SURGERY

## 2025-06-08 PROCEDURE — 36415 COLL VENOUS BLD VENIPUNCTURE: CPT | Performed by: STUDENT IN AN ORGANIZED HEALTH CARE EDUCATION/TRAINING PROGRAM

## 2025-06-08 PROCEDURE — 73590 X-RAY EXAM OF LOWER LEG: CPT | Mod: LT

## 2025-06-08 PROCEDURE — 73523 X-RAY EXAM HIPS BI 5/> VIEWS: CPT | Mod: BILATERAL PROCEDURE | Performed by: SURGERY

## 2025-06-08 PROCEDURE — 81003 URINALYSIS AUTO W/O SCOPE: CPT | Performed by: STUDENT IN AN ORGANIZED HEALTH CARE EDUCATION/TRAINING PROGRAM

## 2025-06-08 PROCEDURE — 73521 X-RAY EXAM HIPS BI 2 VIEWS: CPT

## 2025-06-08 PROCEDURE — 73552 X-RAY EXAM OF FEMUR 2/>: CPT | Mod: LT

## 2025-06-08 PROCEDURE — 85025 COMPLETE CBC W/AUTO DIFF WBC: CPT | Performed by: STUDENT IN AN ORGANIZED HEALTH CARE EDUCATION/TRAINING PROGRAM

## 2025-06-08 PROCEDURE — 2500000001 HC RX 250 WO HCPCS SELF ADMINISTERED DRUGS (ALT 637 FOR MEDICARE OP): Performed by: STUDENT IN AN ORGANIZED HEALTH CARE EDUCATION/TRAINING PROGRAM

## 2025-06-08 PROCEDURE — 99285 EMERGENCY DEPT VISIT HI MDM: CPT | Mod: 25 | Performed by: STUDENT IN AN ORGANIZED HEALTH CARE EDUCATION/TRAINING PROGRAM

## 2025-06-08 PROCEDURE — 2500000002 HC RX 250 W HCPCS SELF ADMINISTERED DRUGS (ALT 637 FOR MEDICARE OP, ALT 636 FOR OP/ED): Performed by: STUDENT IN AN ORGANIZED HEALTH CARE EDUCATION/TRAINING PROGRAM

## 2025-06-08 PROCEDURE — 72131 CT LUMBAR SPINE W/O DYE: CPT

## 2025-06-08 PROCEDURE — 72128 CT CHEST SPINE W/O DYE: CPT | Performed by: SURGERY

## 2025-06-08 RX ORDER — POTASSIUM CHLORIDE 750 MG/1
20 TABLET, FILM COATED, EXTENDED RELEASE ORAL DAILY
Status: DISCONTINUED | OUTPATIENT
Start: 2025-06-08 | End: 2025-06-08 | Stop reason: HOSPADM

## 2025-06-08 RX ORDER — MELOXICAM 7.5 MG/1
7.5 TABLET ORAL DAILY
Qty: 30 TABLET | Refills: 0 | Status: SHIPPED | OUTPATIENT
Start: 2025-06-08 | End: 2025-07-08

## 2025-06-08 RX ORDER — SULFAMETHOXAZOLE AND TRIMETHOPRIM 800; 160 MG/1; MG/1
1 TABLET ORAL ONCE
Status: COMPLETED | OUTPATIENT
Start: 2025-06-08 | End: 2025-06-08

## 2025-06-08 RX ORDER — ACETAMINOPHEN 500 MG
1000 TABLET ORAL EVERY 8 HOURS PRN
Qty: 30 TABLET | Refills: 0 | Status: SHIPPED | OUTPATIENT
Start: 2025-06-08 | End: 2025-06-13

## 2025-06-08 RX ORDER — SULFAMETHOXAZOLE AND TRIMETHOPRIM 800; 160 MG/1; MG/1
1 TABLET ORAL 2 TIMES DAILY
Qty: 10 TABLET | Refills: 0 | Status: SHIPPED | OUTPATIENT
Start: 2025-06-08 | End: 2025-06-13

## 2025-06-08 RX ORDER — ACETAMINOPHEN 325 MG/1
975 TABLET ORAL ONCE
Status: COMPLETED | OUTPATIENT
Start: 2025-06-08 | End: 2025-06-08

## 2025-06-08 RX ADMIN — ACETAMINOPHEN 975 MG: 325 TABLET ORAL at 08:16

## 2025-06-08 RX ADMIN — SULFAMETHOXAZOLE AND TRIMETHOPRIM 1 TABLET: 800; 160 TABLET ORAL at 08:16

## 2025-06-08 RX ADMIN — POTASSIUM CHLORIDE 20 MEQ: 750 TABLET, FILM COATED, EXTENDED RELEASE ORAL at 08:15

## 2025-06-08 ASSESSMENT — PAIN DESCRIPTION - ONSET: ONSET: SUDDEN

## 2025-06-08 ASSESSMENT — COLUMBIA-SUICIDE SEVERITY RATING SCALE - C-SSRS
6. HAVE YOU EVER DONE ANYTHING, STARTED TO DO ANYTHING, OR PREPARED TO DO ANYTHING TO END YOUR LIFE?: NO
1. IN THE PAST MONTH, HAVE YOU WISHED YOU WERE DEAD OR WISHED YOU COULD GO TO SLEEP AND NOT WAKE UP?: NO
2. HAVE YOU ACTUALLY HAD ANY THOUGHTS OF KILLING YOURSELF?: NO

## 2025-06-08 ASSESSMENT — PAIN DESCRIPTION - ORIENTATION: ORIENTATION: LEFT

## 2025-06-08 ASSESSMENT — LIFESTYLE VARIABLES
HAVE YOU EVER FELT YOU SHOULD CUT DOWN ON YOUR DRINKING: NO
HAVE PEOPLE ANNOYED YOU BY CRITICIZING YOUR DRINKING: NO
EVER FELT BAD OR GUILTY ABOUT YOUR DRINKING: NO
EVER HAD A DRINK FIRST THING IN THE MORNING TO STEADY YOUR NERVES TO GET RID OF A HANGOVER: NO
TOTAL SCORE: 0

## 2025-06-08 ASSESSMENT — PAIN DESCRIPTION - LOCATION: LOCATION: KNEE

## 2025-06-08 ASSESSMENT — PAIN DESCRIPTION - PAIN TYPE: TYPE: ACUTE PAIN

## 2025-06-08 ASSESSMENT — PAIN - FUNCTIONAL ASSESSMENT: PAIN_FUNCTIONAL_ASSESSMENT: 0-10

## 2025-06-08 ASSESSMENT — PAIN DESCRIPTION - FREQUENCY: FREQUENCY: CONSTANT/CONTINUOUS

## 2025-06-08 ASSESSMENT — PAIN DESCRIPTION - DESCRIPTORS: DESCRIPTORS: PATIENT UNABLE TO DESCRIBE

## 2025-06-08 ASSESSMENT — PAIN SCALES - GENERAL: PAINLEVEL_OUTOF10: 9

## 2025-06-08 NOTE — PROGRESS NOTES
Emergency Medicine Transition of Care Note.    I received Nadja Rodriguez in signout from Dr. Huitron.  Please see the previous ED provider note for all HPI, PE and MDM up to the time of signout at 0700. This is in addition to the primary record.    In brief Nadja Rodriguez is an 89 y.o. female presenting for   Chief Complaint   Patient presents with    Fall     At the time of signout we were awaiting:    Diagnoses as of 06/08/25 0747   Fall, initial encounter   Musculoskeletal pain   Urinary tract infection without hematuria, site unspecified   Hypokalemia       Medical Decision Making  Patient was signed out to me pending the results of her CT scans, x-rays, and the results of her urine.  She was found to be hypokalemic and was ordered for oral replete meant.  X-rays are negative for any acute pathology.  CT showed no evidence of acute injuries.  Patient was requesting something for pain and oral Tylenol was given.  Urine returned positive for UTI.  This was sent for culture and the patient was started on Bactrim.  Patient was discharged otherwise stable condition with follow-up to her primary care physician.  All questions were answered.      Final diagnoses:   [W19.XXXA] Fall, initial encounter   [M79.18] Musculoskeletal pain   [N39.0] Urinary tract infection without hematuria, site unspecified   [E87.6] Hypokalemia           Procedure  Procedures    Juan Carlos Mondragon MD

## 2025-06-08 NOTE — ED PROVIDER NOTES
HPI   Chief Complaint   Patient presents with    Fall       HPI  89-year-old female presents with fall prior to arrival.  She states she was getting up from her recliner when she thinks she tripped over her feet and fell backwards.  She states she hit her head, although she also states that she landed on her bottom and that she hit her left knee.  She believes she did not lose consciousness but cannot be certain.  She does not take any anticoagulation.  She is endorsing pain in her left knee and lumbar pain.  She denies headache, lightheadedness, dizziness, changes in vision, neck or upper back pain, numbness, tingling, weakness, chest pain, shortness of breath, abdominal pain, nausea, vomiting, diarrhea, recent changes in medications or missed doses of medications, fevers, other recent falls or injuries.      Patient History   Medical History[1]  Surgical History[2]  Family History[3]  Social History[4]    Physical Exam   ED Triage Vitals   Temp Pulse Resp BP   -- -- -- --      SpO2 Temp src Heart Rate Source Patient Position   -- -- -- --      BP Location FiO2 (%)     -- --       Physical Exam  Vitals reviewed.   HENT:      Mouth/Throat:      Pharynx: Oropharynx is clear.   Eyes:      Pupils: Pupils are equal, round, and reactive to light.   Cardiovascular:      Rate and Rhythm: Normal rate and regular rhythm.   Pulmonary:      Effort: Pulmonary effort is normal.      Breath sounds: Normal breath sounds. No stridor. No wheezing, rhonchi or rales.   Abdominal:      Palpations: Abdomen is soft.      Tenderness: There is no abdominal tenderness. There is no guarding or rebound.   Musculoskeletal:         General: No swelling or deformity. Normal range of motion.      Cervical back: Normal range of motion.   Skin:     General: Skin is warm and dry.   Neurological:      General: No focal deficit present.      Mental Status: She is alert and oriented to person, place, and time.           ED Course & MDM   Diagnoses as  of 06/08/25 0746   Fall, initial encounter   Musculoskeletal pain   Urinary tract infection without hematuria, site unspecified   Hypokalemia                 No data recorded     [place] Coma Scale Score: 15 (06/08/25 0724 : Briseida Guerrero RN)                     Medical Decision Making  89-year-old female presents with fall prior to arrival.  Patient is unclear on mechanism on history, reports standing up from her recliner and falling but ultimately endorses hitting her head, lumbar spine, and left knee.  Unknown LOC.  Not on anticoagulation.  On examination, patient is A&O4.  No midline CT LS tenderness but endorses significant lumbar pain which she states is not her baseline.  Pelvis is stable.   pulses are intact throughout.  Endorsing left knee pain, on examination no significant edema or ecchymosis to the left knee.  Stable to palpation.  Imaging of the head,  CT imaging showing no acute traumatic injury.  Lab workup showing slight hypokalemia.  Pending UA.  Patient signed out to oncoming provider pending  UA and dispo.    Procedure  Procedures         [1]   Past Medical History:  Diagnosis Date    Diverticulosis of intestine, part unspecified, without perforation or abscess without bleeding     Diverticulosis    Dysphagia 07/05/2023    Esophageal reflux 07/05/2023    Food impaction of esophagus 07/05/2023    Fracture of radius 04/19/2022    Hyperpigmented skin lesion 07/05/2023    Osteoporosis     Personal history of other diseases of the musculoskeletal system and connective tissue     History of osteoarthritis    Personal history of other endocrine, nutritional and metabolic disease     History of obesity    Personal history of other specified conditions     History of insomnia    Radius fracture 03/13/2024    Rheumatic mitral valve disease, unspecified     Mitral valve problem    Ulna fracture 03/13/2024   [2]   Past Surgical History:  Procedure Laterality Date    BREAST LUMPECTOMY      CATARACT EXTRACTION       COLONOSCOPY      HYSTERECTOMY      OTHER SURGICAL HISTORY  06/04/2019    Hysterectomy    OTHER SURGICAL HISTORY  06/04/2019    Knee surgery    TONSILLECTOMY      UPPER GASTROINTESTINAL ENDOSCOPY     [3]   Family History  Problem Relation Name Age of Onset    Lung cancer Other     [4]   Social History  Tobacco Use    Smoking status: Never    Smokeless tobacco: Never   Vaping Use    Vaping status: Never Used   Substance Use Topics    Alcohol use: Never    Drug use: Never        Isabell Huitron MD  06/08/25 0746

## 2025-06-11 LAB — BACTERIA UR CULT: ABNORMAL

## 2025-06-12 ENCOUNTER — TELEPHONE (OUTPATIENT)
Dept: PHARMACY | Facility: HOSPITAL | Age: 89
End: 2025-06-12
Payer: MEDICARE

## 2025-06-12 NOTE — PROGRESS NOTES
EDPD Note: Antibiotics Reviewed and Warranted    Contacted Mr./Mrs./Ms. Nadja Rodriguez regarding a positive urine culture/result that was taken during their recent emergency room visit. I completed education with patient . The patient is being treated appropriately with bactrim DS BID x 5 days.    Patient presented to the ED on 6/8 after experiencing a fall. No urinary symptoms reported. Discharged with Bactrim DS BID x 5 days based on UA, which is appropriate based on susceptibility.    Patient reports that she is not experiencing any symptoms and that she only has one more tablet left of her 5 day course of Bactrim. Recommended completing course. Patient verbalized understanding.    Susceptibility data from last 90 days.  Collected Specimen Info Organism Ampicillin Cefazolin Cefazolin (uncomplicated UTIs only) Ciprofloxacin Gentamicin Levofloxacin Nitrofurantoin Piperacillin/Tazobactam Trimethoprim/Sulfamethoxazole   06/08/25 Urine from Clean Catch/Voided Escherichia coli  S  S  S  S  S  S  S  S  S        No further follow up needed from EDPD Team.     Kiara Hester, PharmD

## 2025-06-16 ENCOUNTER — APPOINTMENT (OUTPATIENT)
Dept: PRIMARY CARE | Facility: CLINIC | Age: 89
End: 2025-06-16
Payer: MEDICARE

## 2025-06-16 VITALS
OXYGEN SATURATION: 96 % | SYSTOLIC BLOOD PRESSURE: 148 MMHG | RESPIRATION RATE: 20 BRPM | HEART RATE: 78 BPM | HEIGHT: 60 IN | DIASTOLIC BLOOD PRESSURE: 82 MMHG | WEIGHT: 130 LBS | BODY MASS INDEX: 25.52 KG/M2 | TEMPERATURE: 96.1 F

## 2025-06-16 DIAGNOSIS — J30.9 ALLERGIC RHINITIS, UNSPECIFIED SEASONALITY, UNSPECIFIED TRIGGER: ICD-10-CM

## 2025-06-16 DIAGNOSIS — E78.1 HYPERTRIGLYCERIDEMIA: ICD-10-CM

## 2025-06-16 DIAGNOSIS — I10 BENIGN ESSENTIAL HYPERTENSION: Primary | ICD-10-CM

## 2025-06-16 DIAGNOSIS — E87.6 HYPOKALEMIA: ICD-10-CM

## 2025-06-16 PROCEDURE — 1036F TOBACCO NON-USER: CPT | Performed by: STUDENT IN AN ORGANIZED HEALTH CARE EDUCATION/TRAINING PROGRAM

## 2025-06-16 PROCEDURE — 3077F SYST BP >= 140 MM HG: CPT | Performed by: STUDENT IN AN ORGANIZED HEALTH CARE EDUCATION/TRAINING PROGRAM

## 2025-06-16 PROCEDURE — 1123F ACP DISCUSS/DSCN MKR DOCD: CPT | Performed by: STUDENT IN AN ORGANIZED HEALTH CARE EDUCATION/TRAINING PROGRAM

## 2025-06-16 PROCEDURE — 99214 OFFICE O/P EST MOD 30 MIN: CPT | Performed by: STUDENT IN AN ORGANIZED HEALTH CARE EDUCATION/TRAINING PROGRAM

## 2025-06-16 PROCEDURE — 1126F AMNT PAIN NOTED NONE PRSNT: CPT | Performed by: STUDENT IN AN ORGANIZED HEALTH CARE EDUCATION/TRAINING PROGRAM

## 2025-06-16 PROCEDURE — 3079F DIAST BP 80-89 MM HG: CPT | Performed by: STUDENT IN AN ORGANIZED HEALTH CARE EDUCATION/TRAINING PROGRAM

## 2025-06-16 PROCEDURE — 1159F MED LIST DOCD IN RCRD: CPT | Performed by: STUDENT IN AN ORGANIZED HEALTH CARE EDUCATION/TRAINING PROGRAM

## 2025-06-16 PROCEDURE — 1160F RVW MEDS BY RX/DR IN RCRD: CPT | Performed by: STUDENT IN AN ORGANIZED HEALTH CARE EDUCATION/TRAINING PROGRAM

## 2025-06-16 RX ORDER — ACEBUTOLOL HYDROCHLORIDE 200 MG/1
200 CAPSULE ORAL DAILY
Qty: 90 CAPSULE | Refills: 1 | Status: SHIPPED | OUTPATIENT
Start: 2025-06-16

## 2025-06-16 RX ORDER — FUROSEMIDE 40 MG/1
40 TABLET ORAL DAILY
Qty: 90 TABLET | Refills: 1 | Status: SHIPPED | OUTPATIENT
Start: 2025-06-16

## 2025-06-16 RX ORDER — FLUTICASONE PROPIONATE 50 MCG
1 SPRAY, SUSPENSION (ML) NASAL DAILY
Qty: 16 G | Refills: 2 | Status: SHIPPED | OUTPATIENT
Start: 2025-06-16

## 2025-06-16 RX ORDER — POTASSIUM CHLORIDE 750 MG/1
10 CAPSULE, EXTENDED RELEASE ORAL DAILY
Qty: 90 CAPSULE | Refills: 1 | Status: SHIPPED | OUTPATIENT
Start: 2025-06-16

## 2025-06-16 SDOH — ECONOMIC STABILITY: FOOD INSECURITY: WITHIN THE PAST 12 MONTHS, YOU WORRIED THAT YOUR FOOD WOULD RUN OUT BEFORE YOU GOT MONEY TO BUY MORE.: NEVER TRUE

## 2025-06-16 SDOH — ECONOMIC STABILITY: FOOD INSECURITY: WITHIN THE PAST 12 MONTHS, THE FOOD YOU BOUGHT JUST DIDN'T LAST AND YOU DIDN'T HAVE MONEY TO GET MORE.: NEVER TRUE

## 2025-06-16 ASSESSMENT — ENCOUNTER SYMPTOMS
HEADACHES: 0
ABDOMINAL PAIN: 0
FATIGUE: 0
CHILLS: 0
DEPRESSION: 0
WHEEZING: 0
NAUSEA: 0
COLOR CHANGE: 0
CONFUSION: 0
UNEXPECTED WEIGHT CHANGE: 0
DIARRHEA: 0
OCCASIONAL FEELINGS OF UNSTEADINESS: 0
VOMITING: 0
CONSTIPATION: 0
DIZZINESS: 0
FEVER: 0
MUSCULOSKELETAL NEGATIVE: 1
LOSS OF SENSATION IN FEET: 0
PALPITATIONS: 0
COUGH: 0
SHORTNESS OF BREATH: 0

## 2025-06-16 ASSESSMENT — LIFESTYLE VARIABLES
HOW MANY STANDARD DRINKS CONTAINING ALCOHOL DO YOU HAVE ON A TYPICAL DAY: PATIENT DOES NOT DRINK
SKIP TO QUESTIONS 9-10: 1
HOW OFTEN DO YOU HAVE SIX OR MORE DRINKS ON ONE OCCASION: NEVER
HOW OFTEN DO YOU HAVE A DRINK CONTAINING ALCOHOL: NEVER
AUDIT-C TOTAL SCORE: 0

## 2025-06-16 ASSESSMENT — ANXIETY QUESTIONNAIRES
1. FEELING NERVOUS, ANXIOUS, OR ON EDGE: NOT AT ALL
3. WORRYING TOO MUCH ABOUT DIFFERENT THINGS: NOT AT ALL
5. BEING SO RESTLESS THAT IT IS HARD TO SIT STILL: NOT AT ALL
4. TROUBLE RELAXING: NOT AT ALL
IF YOU CHECKED OFF ANY PROBLEMS ON THIS QUESTIONNAIRE, HOW DIFFICULT HAVE THESE PROBLEMS MADE IT FOR YOU TO DO YOUR WORK, TAKE CARE OF THINGS AT HOME, OR GET ALONG WITH OTHER PEOPLE: NOT DIFFICULT AT ALL
2. NOT BEING ABLE TO STOP OR CONTROL WORRYING: NOT AT ALL
GAD7 TOTAL SCORE: 0
7. FEELING AFRAID AS IF SOMETHING AWFUL MIGHT HAPPEN: NOT AT ALL
6. BECOMING EASILY ANNOYED OR IRRITABLE: NOT AT ALL

## 2025-06-16 ASSESSMENT — PATIENT HEALTH QUESTIONNAIRE - PHQ9
1. LITTLE INTEREST OR PLEASURE IN DOING THINGS: NOT AT ALL
2. FEELING DOWN, DEPRESSED OR HOPELESS: NOT AT ALL
SUM OF ALL RESPONSES TO PHQ9 QUESTIONS 1 & 2: 0

## 2025-06-16 ASSESSMENT — PAIN SCALES - GENERAL: PAINLEVEL_OUTOF10: 0-NO PAIN

## 2025-06-16 NOTE — PROGRESS NOTES
Subjective   Patient ID: Nadja Rodriguez is a 89 y.o. female who presents for Follow-up (Previous Rossi Steward pt. /Pt has high blood pressure/Low Potassium ) and Fall (June 8, 2025 - lost balance and fell backwards due to UTI .  Pt feels ok today.  Was on Bactrim for 5 days.).    HPI   Previous [facility] pt here to estb care and also for chronic care follow up. Son in the room. Reports she recently fall backward (06/8/25) while trying to get to bathroom; was eval in the ER; had CT head, LS, CS w/o acute findings; she was found to have UTI (grew E.coli) and was started bactrim x 5 days, completed the course. Reports she is feeling better, back to normal state, no dizziness, HA and other issues. No more urinary issues, back pain or others. 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 Reports she lives by herself, son lives by [place].    Has HTN, IO /82; taking acebutolol 200 mg daily; also taking lasix 40 mg prn for leg swelling along with KCL 10 mEq daily. She is no longer taking other meds.     Review of Systems   Constitutional:  Negative for chills, fatigue, fever and unexpected weight change.   HENT: Negative.     Respiratory:  Negative for cough, shortness of breath and wheezing.    Cardiovascular:  Negative for chest pain, palpitations and leg swelling.   Gastrointestinal:  Negative for abdominal pain, constipation, diarrhea, nausea and vomiting.   Musculoskeletal: Negative.    Skin:  Negative for color change and rash.   Neurological:  Negative for dizziness and headaches.   Psychiatric/Behavioral:  Negative for behavioral problems and confusion.        Objective   /82 (BP Location: Right arm, Patient Position: Sitting, BP Cuff Size: Large adult)   Pulse 78   Temp 35.6 °C (96.1 °F) (Temporal)   Resp 20   Ht 1.524 m (5')   Wt 59 kg (130 lb) Comment: per pt  SpO2 96%   BMI 25.39 kg/m²     Physical Exam  Vitals and  nursing note reviewed.   Constitutional:       Appearance: Normal appearance.      Comments: Elderly female sitting on wheel chair. Son in the room.      Cardiovascular:      Rate and Rhythm: Normal rate and regular rhythm.      Pulses: Normal pulses.      Heart sounds: Normal heart sounds.   Pulmonary:      Effort: Pulmonary effort is normal.      Breath sounds: Normal breath sounds.   Abdominal:      General: Abdomen is flat. Bowel sounds are normal.      Palpations: Abdomen is soft.   Musculoskeletal:         General: Normal range of motion.      Comments: Noted hand deformities with deviation of multiple digits.    Neurological:      General: No focal deficit present.      Mental Status: She is alert.   Psychiatric:         Mood and Affect: Mood normal.         Behavior: Behavior normal.         Assessment/Plan   Previous [facility] pt here to estb care and also for chronic care follow up. Son in the room. Appears she is doing much better post fall/ER visit, her fall was likely 2/2 UTI in absence of other findings. Rec using assisted device for walking as possible, has walker, cane at home, not using d/t hand deformities form arthritis. D/ about fall preventions as good lighting, avoid loose rugs and others.   BP remains sl elevated, cont current meds; rx refilled. Follow DASH diet as possible.   Dependent edema been stable on prn lasix, cont same along with KCL. Repeat BMP/mag to monitor lytes. Added annual bld work for NOV. She is otherwise clinically stable.     Problem List Items Addressed This Visit           ICD-10-CM    Benign essential hypertension - Primary I10    Relevant Medications    furosemide (Lasix) 40 mg tablet    acebutolol (Sectral) 200 mg capsule    Other Relevant Orders    Comprehensive Metabolic Panel    CBC    Allergic rhinitis J30.9    Relevant Medications    fluticasone (Flonase) 50 mcg/actuation nasal spray    Hypertriglyceridemia E78.1    Relevant Orders    Lipid Panel    Hypokalemia E87.6     Relevant Medications    potassium chloride ER (Micro-K) 10 mEq ER capsule    Other Relevant Orders    Magnesium    Basic metabolic panel     Rtc 4-6 mo for MCR?FU     This note was partially generated using the Dragon Voice recognition system. There may be some incorrect wording, spelling and/or spelling errors or punctuation errors that were not corrected prior to committing the note to the medical record.      Rohit Villagran MD    [facility]

## 2025-06-24 LAB
ANION GAP SERPL CALCULATED.4IONS-SCNC: 11 MMOL/L (CALC) (ref 7–17)
BUN SERPL-MCNC: 14 MG/DL (ref 7–25)
BUN/CREAT SERPL: 28 (CALC) (ref 6–22)
CALCIUM SERPL-MCNC: 9.4 MG/DL (ref 8.6–10.4)
CHLORIDE SERPL-SCNC: 105 MMOL/L (ref 98–110)
CO2 SERPL-SCNC: 23 MMOL/L (ref 20–32)
CREAT SERPL-MCNC: 0.5 MG/DL (ref 0.6–0.95)
EGFRCR SERPLBLD CKD-EPI 2021: 90 ML/MIN/1.73M2
GLUCOSE SERPL-MCNC: 93 MG/DL (ref 65–139)
MAGNESIUM SERPL-MCNC: 2.3 MG/DL (ref 1.5–2.5)
POTASSIUM SERPL-SCNC: 4 MMOL/L (ref 3.5–5.3)
SODIUM SERPL-SCNC: 139 MMOL/L (ref 135–146)

## 2025-06-27 ENCOUNTER — APPOINTMENT (OUTPATIENT)
Dept: RADIOLOGY | Facility: HOSPITAL | Age: 89
End: 2025-06-27
Payer: MEDICARE

## 2025-06-27 ENCOUNTER — HOSPITAL ENCOUNTER (INPATIENT)
Facility: HOSPITAL | Age: 89
End: 2025-06-27
Attending: EMERGENCY MEDICINE | Admitting: STUDENT IN AN ORGANIZED HEALTH CARE EDUCATION/TRAINING PROGRAM
Payer: MEDICARE

## 2025-06-27 DIAGNOSIS — G89.4 CHRONIC PAIN SYNDROME: ICD-10-CM

## 2025-06-27 DIAGNOSIS — R31.9 URINARY TRACT INFECTION WITH HEMATURIA, SITE UNSPECIFIED: Primary | ICD-10-CM
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
## 2025-06-27 DIAGNOSIS — N39.0 URINARY TRACT INFECTION WITH HEMATURIA, SITE UNSPECIFIED: Primary | ICD-10-CM

## 2025-06-27 DIAGNOSIS — I48.0 PAROXYSMAL ATRIAL FIBRILLATION (MULTI): ICD-10-CM

## 2025-06-27 PROBLEM — I48.91 A-FIB (MULTI): Status: ACTIVE | Noted: 2025-06-27

## 2025-06-27 LAB
ABO GROUP (TYPE) IN BLOOD: NORMAL
ALBUMIN SERPL BCP-MCNC: 4 G/DL (ref 3.4–5)
ALP SERPL-CCNC: 90 U/L (ref 33–136)
ALT SERPL W P-5'-P-CCNC: 24 U/L (ref 7–45)
ANION GAP SERPL CALC-SCNC: 13 MMOL/L (ref 10–20)
ANTIBODY SCREEN: NORMAL
APPEARANCE UR: CLEAR
AST SERPL W P-5'-P-CCNC: 41 U/L (ref 9–39)
BASOPHILS # BLD AUTO: 0.03 X10*3/UL (ref 0–0.1)
BASOPHILS NFR BLD AUTO: 0.2 %
BILIRUB SERPL-MCNC: 0.9 MG/DL (ref 0–1.2)
BILIRUB UR STRIP.AUTO-MCNC: NEGATIVE MG/DL
BUN SERPL-MCNC: 16 MG/DL (ref 6–23)
CALCIUM SERPL-MCNC: 9.2 MG/DL (ref 8.6–10.3)
CHLORIDE SERPL-SCNC: 104 MMOL/L (ref 98–107)
CK SERPL-CCNC: 368 U/L (ref 0–215)
CO2 SERPL-SCNC: 25 MMOL/L (ref 21–32)
COLOR UR: YELLOW
CREAT SERPL-MCNC: 0.4 MG/DL (ref 0.5–1.05)
EGFRCR SERPLBLD CKD-EPI 2021: >90 ML/MIN/1.73M*2
EOSINOPHIL # BLD AUTO: 0.06 X10*3/UL (ref 0–0.4)
EOSINOPHIL NFR BLD AUTO: 0.5 %
ERYTHROCYTE [DISTWIDTH] IN BLOOD BY AUTOMATED COUNT: 14 % (ref 11.5–14.5)
GLUCOSE SERPL-MCNC: 106 MG/DL (ref 74–99)
GLUCOSE UR STRIP.AUTO-MCNC: NORMAL MG/DL
HCT VFR BLD AUTO: 45.2 % (ref 36–46)
HGB BLD-MCNC: 15.1 G/DL (ref 12–16)
IMM GRANULOCYTES # BLD AUTO: 0.04 X10*3/UL (ref 0–0.5)
IMM GRANULOCYTES NFR BLD AUTO: 0.3 % (ref 0–0.9)
INR PPP: 1.1 (ref 0.9–1.1)
KETONES UR STRIP.AUTO-MCNC: ABNORMAL MG/DL
LACTATE SERPL-SCNC: 1.4 MMOL/L (ref 0.4–2)
LEUKOCYTE ESTERASE UR QL STRIP.AUTO: ABNORMAL
LYMPHOCYTES # BLD AUTO: 1.97 X10*3/UL (ref 0.8–3)
LYMPHOCYTES NFR BLD AUTO: 16.4 %
MAGNESIUM SERPL-MCNC: 1.95 MG/DL (ref 1.6–2.4)
MCH RBC QN AUTO: 29.3 PG (ref 26–34)
MCHC RBC AUTO-ENTMCNC: 33.4 G/DL (ref 32–36)
MCV RBC AUTO: 88 FL (ref 80–100)
MONOCYTES # BLD AUTO: 0.94 X10*3/UL (ref 0.05–0.8)
MONOCYTES NFR BLD AUTO: 7.8 %
NEUTROPHILS # BLD AUTO: 8.99 X10*3/UL (ref 1.6–5.5)
NEUTROPHILS NFR BLD AUTO: 74.8 %
NITRITE UR QL STRIP.AUTO: NEGATIVE
NRBC BLD-RTO: 0 /100 WBCS (ref 0–0)
PH UR STRIP.AUTO: 5.5 [PH]
PLATELET # BLD AUTO: 243 X10*3/UL (ref 150–450)
POTASSIUM SERPL-SCNC: 3.4 MMOL/L (ref 3.5–5.3)
PROT SERPL-MCNC: 7 G/DL (ref 6.4–8.2)
PROT UR STRIP.AUTO-MCNC: ABNORMAL MG/DL
PROTHROMBIN TIME: 11.9 SECONDS (ref 9.8–12.4)
RBC # BLD AUTO: 5.16 X10*6/UL (ref 4–5.2)
RBC # UR STRIP.AUTO: NEGATIVE MG/DL
RBC #/AREA URNS AUTO: ABNORMAL /HPF
RH FACTOR (ANTIGEN D): NORMAL
SODIUM SERPL-SCNC: 139 MMOL/L (ref 136–145)
SP GR UR STRIP.AUTO: 1.03
SQUAMOUS #/AREA URNS AUTO: ABNORMAL /HPF
UROBILINOGEN UR STRIP.AUTO-MCNC: NORMAL MG/DL
WBC # BLD AUTO: 12 X10*3/UL (ref 4.4–11.3)
WBC #/AREA URNS AUTO: ABNORMAL /HPF

## 2025-06-27 PROCEDURE — 2550000001 HC RX 255 CONTRASTS: Mod: JW | Performed by: EMERGENCY MEDICINE

## 2025-06-27 PROCEDURE — 72128 CT CHEST SPINE W/O DYE: CPT | Performed by: RADIOLOGY

## 2025-06-27 PROCEDURE — 99285 EMERGENCY DEPT VISIT HI MDM: CPT | Mod: 25 | Performed by: EMERGENCY MEDICINE

## 2025-06-27 PROCEDURE — 2500000005 HC RX 250 GENERAL PHARMACY W/O HCPCS

## 2025-06-27 PROCEDURE — 2500000002 HC RX 250 W HCPCS SELF ADMINISTERED DRUGS (ALT 637 FOR MEDICARE OP, ALT 636 FOR OP/ED): Performed by: STUDENT IN AN ORGANIZED HEALTH CARE EDUCATION/TRAINING PROGRAM

## 2025-06-27 PROCEDURE — 82550 ASSAY OF CK (CPK): CPT | Performed by: STUDENT IN AN ORGANIZED HEALTH CARE EDUCATION/TRAINING PROGRAM

## 2025-06-27 PROCEDURE — 86850 RBC ANTIBODY SCREEN: CPT | Performed by: STUDENT IN AN ORGANIZED HEALTH CARE EDUCATION/TRAINING PROGRAM

## 2025-06-27 PROCEDURE — 83605 ASSAY OF LACTIC ACID: CPT | Performed by: STUDENT IN AN ORGANIZED HEALTH CARE EDUCATION/TRAINING PROGRAM

## 2025-06-27 PROCEDURE — 85610 PROTHROMBIN TIME: CPT | Performed by: STUDENT IN AN ORGANIZED HEALTH CARE EDUCATION/TRAINING PROGRAM

## 2025-06-27 PROCEDURE — 96361 HYDRATE IV INFUSION ADD-ON: CPT

## 2025-06-27 PROCEDURE — 72131 CT LUMBAR SPINE W/O DYE: CPT | Performed by: RADIOLOGY

## 2025-06-27 PROCEDURE — 85025 COMPLETE CBC W/AUTO DIFF WBC: CPT | Performed by: STUDENT IN AN ORGANIZED HEALTH CARE EDUCATION/TRAINING PROGRAM

## 2025-06-27 PROCEDURE — 87086 URINE CULTURE/COLONY COUNT: CPT | Mod: PORLAB | Performed by: STUDENT IN AN ORGANIZED HEALTH CARE EDUCATION/TRAINING PROGRAM

## 2025-06-27 PROCEDURE — 73502 X-RAY EXAM HIP UNI 2-3 VIEWS: CPT | Mod: LEFT SIDE | Performed by: RADIOLOGY

## 2025-06-27 PROCEDURE — 2060000001 HC INTERMEDIATE ICU ROOM DAILY

## 2025-06-27 PROCEDURE — 2500000004 HC RX 250 GENERAL PHARMACY W/ HCPCS (ALT 636 FOR OP/ED): Performed by: STUDENT IN AN ORGANIZED HEALTH CARE EDUCATION/TRAINING PROGRAM

## 2025-06-27 PROCEDURE — 2500000004 HC RX 250 GENERAL PHARMACY W/ HCPCS (ALT 636 FOR OP/ED): Performed by: EMERGENCY MEDICINE

## 2025-06-27 PROCEDURE — 83735 ASSAY OF MAGNESIUM: CPT | Performed by: STUDENT IN AN ORGANIZED HEALTH CARE EDUCATION/TRAINING PROGRAM

## 2025-06-27 PROCEDURE — 71260 CT THORAX DX C+: CPT | Performed by: RADIOLOGY

## 2025-06-27 PROCEDURE — 80053 COMPREHEN METABOLIC PANEL: CPT | Performed by: STUDENT IN AN ORGANIZED HEALTH CARE EDUCATION/TRAINING PROGRAM

## 2025-06-27 PROCEDURE — 81001 URINALYSIS AUTO W/SCOPE: CPT | Performed by: STUDENT IN AN ORGANIZED HEALTH CARE EDUCATION/TRAINING PROGRAM

## 2025-06-27 PROCEDURE — 2500000004 HC RX 250 GENERAL PHARMACY W/ HCPCS (ALT 636 FOR OP/ED)

## 2025-06-27 PROCEDURE — 74177 CT ABD & PELVIS W/CONTRAST: CPT

## 2025-06-27 PROCEDURE — 99223 1ST HOSP IP/OBS HIGH 75: CPT | Performed by: STUDENT IN AN ORGANIZED HEALTH CARE EDUCATION/TRAINING PROGRAM

## 2025-06-27 PROCEDURE — 73502 X-RAY EXAM HIP UNI 2-3 VIEWS: CPT | Mod: LT

## 2025-06-27 PROCEDURE — 74177 CT ABD & PELVIS W/CONTRAST: CPT | Performed by: RADIOLOGY

## 2025-06-27 PROCEDURE — 96375 TX/PRO/DX INJ NEW DRUG ADDON: CPT

## 2025-06-27 PROCEDURE — 96374 THER/PROPH/DIAG INJ IV PUSH: CPT

## 2025-06-27 PROCEDURE — 2500000001 HC RX 250 WO HCPCS SELF ADMINISTERED DRUGS (ALT 637 FOR MEDICARE OP): Performed by: STUDENT IN AN ORGANIZED HEALTH CARE EDUCATION/TRAINING PROGRAM

## 2025-06-27 PROCEDURE — 86901 BLOOD TYPING SEROLOGIC RH(D): CPT | Performed by: STUDENT IN AN ORGANIZED HEALTH CARE EDUCATION/TRAINING PROGRAM

## 2025-06-27 PROCEDURE — 36415 COLL VENOUS BLD VENIPUNCTURE: CPT | Performed by: STUDENT IN AN ORGANIZED HEALTH CARE EDUCATION/TRAINING PROGRAM

## 2025-06-27 RX ORDER — CEFTRIAXONE 1 G/50ML
1 INJECTION, SOLUTION INTRAVENOUS EVERY 24 HOURS
Status: DISPENSED | OUTPATIENT
Start: 2025-06-28

## 2025-06-27 RX ORDER — POTASSIUM CHLORIDE 20 MEQ/1
20 TABLET, EXTENDED RELEASE ORAL DAILY
Status: DISPENSED | OUTPATIENT
Start: 2025-06-27

## 2025-06-27 RX ORDER — GUAIFENESIN 600 MG/1
600 TABLET, EXTENDED RELEASE ORAL EVERY 12 HOURS PRN
Status: ACTIVE | OUTPATIENT
Start: 2025-06-27

## 2025-06-27 RX ORDER — ACEBUTOLOL HYDROCHLORIDE 200 MG/1
200 CAPSULE ORAL DAILY
Status: DISPENSED | OUTPATIENT
Start: 2025-06-27

## 2025-06-27 RX ORDER — ENOXAPARIN SODIUM 100 MG/ML
1 INJECTION SUBCUTANEOUS EVERY 12 HOURS
Status: DISCONTINUED | OUTPATIENT
Start: 2025-06-27 | End: 2025-06-28

## 2025-06-27 RX ORDER — CEFTRIAXONE 1 G/50ML
1 INJECTION, SOLUTION INTRAVENOUS ONCE
Status: COMPLETED | OUTPATIENT
Start: 2025-06-27 | End: 2025-06-27

## 2025-06-27 RX ORDER — ONDANSETRON HYDROCHLORIDE 2 MG/ML
4 INJECTION, SOLUTION INTRAVENOUS EVERY 8 HOURS PRN
Status: ACTIVE | OUTPATIENT
Start: 2025-06-27

## 2025-06-27 RX ORDER — ONDANSETRON 4 MG/1
4 TABLET, FILM COATED ORAL EVERY 8 HOURS PRN
Status: ACTIVE | OUTPATIENT
Start: 2025-06-27

## 2025-06-27 RX ORDER — DEXTROSE MONOHYDRATE AND SODIUM CHLORIDE 5; .45 G/100ML; G/100ML
75 INJECTION, SOLUTION INTRAVENOUS CONTINUOUS
Status: DISCONTINUED | OUTPATIENT
Start: 2025-06-27 | End: 2025-06-28

## 2025-06-27 RX ORDER — PANTOPRAZOLE SODIUM 40 MG/1
40 TABLET, DELAYED RELEASE ORAL
Status: DISPENSED | OUTPATIENT
Start: 2025-06-28

## 2025-06-27 RX ORDER — DILTIAZEM HYDROCHLORIDE 5 MG/ML
10 INJECTION INTRAVENOUS ONCE
Status: COMPLETED | OUTPATIENT
Start: 2025-06-27 | End: 2025-06-27

## 2025-06-27 RX ORDER — DILTIAZEM HYDROCHLORIDE 5 MG/ML
INJECTION INTRAVENOUS
Status: COMPLETED
Start: 2025-06-27 | End: 2025-06-27

## 2025-06-27 RX ORDER — PANTOPRAZOLE SODIUM 40 MG/10ML
40 INJECTION, POWDER, LYOPHILIZED, FOR SOLUTION INTRAVENOUS
Status: ACTIVE | OUTPATIENT
Start: 2025-06-28

## 2025-06-27 RX ORDER — FLUTICASONE PROPIONATE 50 MCG
1 SPRAY, SUSPENSION (ML) NASAL DAILY
Status: DISPENSED | OUTPATIENT
Start: 2025-06-27

## 2025-06-27 RX ORDER — METOPROLOL TARTRATE 1 MG/ML
INJECTION, SOLUTION INTRAVENOUS
Status: COMPLETED
Start: 2025-06-27 | End: 2025-06-27

## 2025-06-27 RX ORDER — METOPROLOL TARTRATE 1 MG/ML
5 INJECTION, SOLUTION INTRAVENOUS ONCE
Status: COMPLETED | OUTPATIENT
Start: 2025-06-27 | End: 2025-06-27

## 2025-06-27 RX ORDER — TALC
3 POWDER (GRAM) TOPICAL NIGHTLY PRN
Status: ACTIVE | OUTPATIENT
Start: 2025-06-27

## 2025-06-27 RX ORDER — KETOROLAC TROMETHAMINE 30 MG/ML
15 INJECTION, SOLUTION INTRAMUSCULAR; INTRAVENOUS ONCE
Status: COMPLETED | OUTPATIENT
Start: 2025-06-27 | End: 2025-06-27

## 2025-06-27 RX ORDER — MAGNESIUM SULFATE HEPTAHYDRATE 40 MG/ML
2 INJECTION, SOLUTION INTRAVENOUS ONCE
Status: COMPLETED | OUTPATIENT
Start: 2025-06-27 | End: 2025-06-27

## 2025-06-27 RX ORDER — POTASSIUM CHLORIDE 750 MG/1
10 CAPSULE, EXTENDED RELEASE ORAL DAILY
Status: DISCONTINUED | OUTPATIENT
Start: 2025-06-28 | End: 2025-06-28

## 2025-06-27 RX ADMIN — METOPROLOL TARTRATE 5 MG: 5 INJECTION INTRAVENOUS at 19:43

## 2025-06-27 RX ADMIN — SODIUM CHLORIDE, SODIUM LACTATE, POTASSIUM CHLORIDE, AND CALCIUM CHLORIDE 1000 ML: .6; .31; .03; .02 INJECTION, SOLUTION INTRAVENOUS at 14:30

## 2025-06-27 RX ADMIN — ACEBUTOLOL HYDROCHLORIDE 200 MG: 200 CAPSULE ORAL at 23:51

## 2025-06-27 RX ADMIN — POTASSIUM CHLORIDE 20 MEQ: 1500 TABLET, EXTENDED RELEASE ORAL at 16:07

## 2025-06-27 RX ADMIN — IOHEXOL 75 ML: 350 INJECTION, SOLUTION INTRAVENOUS at 15:46

## 2025-06-27 RX ADMIN — ENOXAPARIN SODIUM 60 MG: 60 INJECTION SUBCUTANEOUS at 21:42

## 2025-06-27 RX ADMIN — CEFTRIAXONE 1 G: 1 INJECTION, SOLUTION INTRAVENOUS at 20:34

## 2025-06-27 RX ADMIN — DILTIAZEM HYDROCHLORIDE 10 MG: 5 INJECTION, SOLUTION INTRAVENOUS at 19:39

## 2025-06-27 RX ADMIN — DEXTROSE AND SODIUM CHLORIDE 75 ML/HR: 5; .45 INJECTION, SOLUTION INTRAVENOUS at 20:35

## 2025-06-27 RX ADMIN — MAGNESIUM SULFATE HEPTAHYDRATE 2 G: 40 INJECTION, SOLUTION INTRAVENOUS at 19:51

## 2025-06-27 RX ADMIN — DILTIAZEM HYDROCHLORIDE 10 MG: 5 INJECTION INTRAVENOUS at 19:39

## 2025-06-27 RX ADMIN — KETOROLAC TROMETHAMINE 15 MG: 30 INJECTION, SOLUTION INTRAMUSCULAR at 15:16

## 2025-06-27 RX ADMIN — METOPROLOL TARTRATE 5 MG: 1 INJECTION, SOLUTION INTRAVENOUS at 19:43

## 2025-06-27 RX ADMIN — FLUTICASONE PROPIONATE 1 SPRAY: 50 SPRAY, METERED NASAL at 20:44

## 2025-06-27 SDOH — ECONOMIC STABILITY: HOUSING INSECURITY: IN THE PAST 12 MONTHS, HOW MANY TIMES HAVE YOU MOVED WHERE YOU WERE LIVING?: 0

## 2025-06-27 SDOH — ECONOMIC STABILITY: TRANSPORTATION INSECURITY: IN THE PAST 12 MONTHS, HAS LACK OF TRANSPORTATION KEPT YOU FROM MEDICAL APPOINTMENTS OR FROM GETTING MEDICATIONS?: NO

## 2025-06-27 SDOH — SOCIAL STABILITY: SOCIAL INSECURITY: WITHIN THE LAST YEAR, HAVE YOU BEEN HUMILIATED OR EMOTIONALLY ABUSED IN OTHER WAYS BY YOUR PARTNER OR EX-PARTNER?: NO

## 2025-06-27 SDOH — ECONOMIC STABILITY: FOOD INSECURITY: WITHIN THE PAST 12 MONTHS, YOU WORRIED THAT YOUR FOOD WOULD RUN OUT BEFORE YOU GOT THE MONEY TO BUY MORE.: NEVER TRUE

## 2025-06-27 SDOH — ECONOMIC STABILITY: HOUSING INSECURITY: AT ANY TIME IN THE PAST 12 MONTHS, WERE YOU HOMELESS OR LIVING IN A SHELTER (INCLUDING NOW)?: NO

## 2025-06-27 SDOH — ECONOMIC STABILITY: FOOD INSECURITY: WITHIN THE PAST 12 MONTHS, THE FOOD YOU BOUGHT JUST DIDN'T LAST AND YOU DIDN'T HAVE MONEY TO GET MORE.: NEVER TRUE

## 2025-06-27 SDOH — SOCIAL STABILITY: SOCIAL INSECURITY: HAVE YOU HAD THOUGHTS OF HARMING ANYONE ELSE?: NO

## 2025-06-27 SDOH — ECONOMIC STABILITY: FOOD INSECURITY: HOW HARD IS IT FOR YOU TO PAY FOR THE VERY BASICS LIKE FOOD, HOUSING, MEDICAL CARE, AND HEATING?: NOT HARD AT ALL

## 2025-06-27 SDOH — SOCIAL STABILITY: SOCIAL INSECURITY
WITHIN THE LAST YEAR, HAVE YOU BEEN RAPED OR FORCED TO HAVE ANY KIND OF SEXUAL ACTIVITY BY YOUR PARTNER OR EX-PARTNER?: NO

## 2025-06-27 SDOH — SOCIAL STABILITY: SOCIAL INSECURITY: WITHIN THE LAST YEAR, HAVE YOU BEEN AFRAID OF YOUR PARTNER OR EX-PARTNER?: NO

## 2025-06-27 SDOH — SOCIAL STABILITY: SOCIAL INSECURITY
WITHIN THE LAST YEAR, HAVE YOU BEEN KICKED, HIT, SLAPPED, OR OTHERWISE PHYSICALLY HURT BY YOUR PARTNER OR EX-PARTNER?: NO

## 2025-06-27 SDOH — SOCIAL STABILITY: SOCIAL INSECURITY: WERE YOU ABLE TO COMPLETE ALL THE BEHAVIORAL HEALTH SCREENINGS?: YES

## 2025-06-27 SDOH — ECONOMIC STABILITY: INCOME INSECURITY: IN THE PAST 12 MONTHS HAS THE ELECTRIC, GAS, OIL, OR WATER COMPANY THREATENED TO SHUT OFF SERVICES IN YOUR HOME?: NO

## 2025-06-27 SDOH — ECONOMIC STABILITY: HOUSING INSECURITY: IN THE LAST 12 MONTHS, WAS THERE A TIME WHEN YOU WERE NOT ABLE TO PAY THE MORTGAGE OR RENT ON TIME?: NO

## 2025-06-27 ASSESSMENT — ENCOUNTER SYMPTOMS
FATIGUE: 0
HEADACHES: 0
APPETITE CHANGE: 0
SINUS PAIN: 0
DIFFICULTY URINATING: 0
FREQUENCY: 0
NUMBNESS: 0
FLANK PAIN: 0
ABDOMINAL DISTENTION: 0
DIARRHEA: 0
WOUND: 0
WEAKNESS: 1
CHEST TIGHTNESS: 0
BACK PAIN: 0
ABDOMINAL PAIN: 0
RHINORRHEA: 0
FEVER: 0
VOMITING: 0
STRIDOR: 0
PALPITATIONS: 0
AGITATION: 0
MYALGIAS: 0
ARTHRALGIAS: 1
WHEEZING: 0
CONFUSION: 0
CONSTIPATION: 0
DIZZINESS: 0
COLOR CHANGE: 0
CHILLS: 0
APNEA: 0
ACTIVITY CHANGE: 0
JOINT SWELLING: 0
HEMATURIA: 0
NERVOUS/ANXIOUS: 0
SHORTNESS OF BREATH: 0
SORE THROAT: 0
LIGHT-HEADEDNESS: 0
NAUSEA: 0
DYSURIA: 0
DIAPHORESIS: 0
DECREASED CONCENTRATION: 0
COUGH: 0

## 2025-06-27 ASSESSMENT — LIFESTYLE VARIABLES
HOW MANY STANDARD DRINKS CONTAINING ALCOHOL DO YOU HAVE ON A TYPICAL DAY: PATIENT DOES NOT DRINK
HAVE PEOPLE ANNOYED YOU BY CRITICIZING YOUR DRINKING: NO
EVER FELT BAD OR GUILTY ABOUT YOUR DRINKING: NO
HOW OFTEN DO YOU HAVE A DRINK CONTAINING ALCOHOL: NEVER
EVER HAD A DRINK FIRST THING IN THE MORNING TO STEADY YOUR NERVES TO GET RID OF A HANGOVER: NO
AUDIT-C TOTAL SCORE: 0
HAVE YOU EVER FELT YOU SHOULD CUT DOWN ON YOUR DRINKING: NO
PRESCIPTION_ABUSE_PAST_12_MONTHS: NO
AUDIT-C TOTAL SCORE: 0
SUBSTANCE_ABUSE_PAST_12_MONTHS: NO
HOW OFTEN DO YOU HAVE 6 OR MORE DRINKS ON ONE OCCASION: NEVER
SKIP TO QUESTIONS 9-10: 1
TOTAL SCORE: 0

## 2025-06-27 ASSESSMENT — ACTIVITIES OF DAILY LIVING (ADL)
BATHING: NEEDS ASSISTANCE
LACK_OF_TRANSPORTATION: NO
HEARING - RIGHT EAR: FUNCTIONAL
HEARING - LEFT EAR: FUNCTIONAL
ASSISTIVE_DEVICE: DENTURES LOWER;DENTURES UPPER
GROOMING: INDEPENDENT
LACK_OF_TRANSPORTATION: NO
WALKS IN HOME: NEEDS ASSISTANCE
DRESSING YOURSELF: NEEDS ASSISTANCE
FEEDING YOURSELF: INDEPENDENT
TOILETING: NEEDS ASSISTANCE
PATIENT'S MEMORY ADEQUATE TO SAFELY COMPLETE DAILY ACTIVITIES?: YES
ADEQUATE_TO_COMPLETE_ADL: YES
JUDGMENT_ADEQUATE_SAFELY_COMPLETE_DAILY_ACTIVITIES: YES

## 2025-06-27 ASSESSMENT — COGNITIVE AND FUNCTIONAL STATUS - GENERAL
PATIENT BASELINE BEDBOUND: NO
STANDING UP FROM CHAIR USING ARMS: A LITTLE
TURNING FROM BACK TO SIDE WHILE IN FLAT BAD: A LITTLE
WALKING IN HOSPITAL ROOM: A LITTLE
MOVING FROM LYING ON BACK TO SITTING ON SIDE OF FLAT BED WITH BEDRAILS: A LITTLE
MOBILITY SCORE: 18
DAILY ACTIVITIY SCORE: 19
CLIMB 3 TO 5 STEPS WITH RAILING: A LITTLE
MOVING TO AND FROM BED TO CHAIR: A LITTLE
DRESSING REGULAR UPPER BODY CLOTHING: A LITTLE
DRESSING REGULAR LOWER BODY CLOTHING: A LITTLE
HELP NEEDED FOR BATHING: A LITTLE
PERSONAL GROOMING: A LITTLE
TOILETING: A LITTLE

## 2025-06-27 ASSESSMENT — PAIN - FUNCTIONAL ASSESSMENT
PAIN_FUNCTIONAL_ASSESSMENT: 0-10

## 2025-06-27 ASSESSMENT — PAIN SCALES - GENERAL
PAINLEVEL_OUTOF10: 7
PAINLEVEL_OUTOF10: 0 - NO PAIN
PAINLEVEL_OUTOF10: 3
PAINLEVEL_OUTOF10: 0 - NO PAIN

## 2025-06-27 ASSESSMENT — PATIENT HEALTH QUESTIONNAIRE - PHQ9
SUM OF ALL RESPONSES TO PHQ9 QUESTIONS 1 & 2: 0
1. LITTLE INTEREST OR PLEASURE IN DOING THINGS: NOT AT ALL
2. FEELING DOWN, DEPRESSED OR HOPELESS: NOT AT ALL

## 2025-06-27 NOTE — PROGRESS NOTES
The patient at change of shift.  Patient underwent CT imaging which was negative for any acute traumatic findings.  Patient on reevaluation was feeling improved however admits that she has been generally weak and now with recurrent falls.  She was found to have a urinary tract infection urine culture is pending she was given a gram of Rocephin for treatment and started on maintenance fluids with dextrose due to significant dehydration.  Patient was agreeable with plan for hospitalization and ultimately is requesting long-term nursing home placement because she states that she can no longer care for herself at home.  Patient discussed with [facility] who was agreeable with the plan for admission.

## 2025-06-27 NOTE — ED PROVIDER NOTES
HPI   Chief Complaint   Patient presents with    Fall     Fall at home  Wednesday at 1600. PT fell backwards and was stuck on the floor since. Pt was checked on by family today and brought to the ED. PT Aox4. No blood thinners noted in pt's chart or taken per pt       HPI: Patient is an 89-year-old female, she has a past medical history of hypertension, allergic rhinitis, osteoporosis, hypokalemia, lives by herself, who is presenting to the emergency department after a fall.  Patient fell Wednesday afternoon and was unable to get up, welfare check was done today and she was found on the floor and was brought in for further evaluation.  She is complaining of back as well as pelvic and hip pain.  She is not on any blood thinners.  She did not hit her head or lose consciousness with the fall, she reports the fall was mechanical.  Denies any prodromal symptoms prior to the fall.  Patient does state that she has been falling quite frequently, she also reports that she would like to be placed into a nursing home as she does not feel safe at home with the multiple falls that she has been experiencing.      ROS: Complete 12 point review of systems performed, otherwise negative except as noted in the history of present illness    PMH: Reviewed, documented below in note. Pertinents in HPI  PSH: Reviewed and documented below in note. Pertinents in HPI  SH: No illicits, not homeless but does live alone  Fam: Reviewed, noncontributory to patients current complaint  MEDS: Reviewed and documented below in note. Pertinents in HPI  ALLERGIES: Reviewed and documented below in note.        History provided by:  Patient and medical records   used: No                          Radha Coma Scale Score: 15                  Patient History   Medical History[1]  Surgical History[2]  Family History[3]  Social History[4]    Physical Exam   Visit Vitals  /64 (BP Location: Right arm, Patient Position: Lying)    Pulse 81   Temp 37.4 °C (99.3 °F) (Temporal)   Resp 18   Ht (!) 1.524 m (5')   Wt 60.7 kg (133 lb 13.1 oz)   SpO2 91%   BMI 26.13 kg/m²   Smoking Status Never   BSA 1.6 m²      Physical Exam  Vitals and nursing note reviewed.   Constitutional:       Appearance: Normal appearance.   HENT:      Head: Normocephalic and atraumatic.   Neck:      Vascular: No carotid bruit.   Cardiovascular:      Rate and Rhythm: Normal rate and regular rhythm.      Pulses: Normal pulses.      Heart sounds: Normal heart sounds.   Pulmonary:      Effort: Pulmonary effort is normal.      Breath sounds: Normal breath sounds.   Abdominal:      General: There is no distension.      Palpations: Abdomen is soft.      Tenderness: There is no abdominal tenderness. There is no guarding or rebound.   Musculoskeletal:         General: Tenderness present. No deformity or signs of injury.      Cervical back: Normal range of motion. No rigidity.      Comments: Decreased range of motion of the left hip secondary to pain.  Bilateral pelvic and hip pain, left greater than right.  She also has some midline L-spine tenderness as well as some paraspinal thoracic and lumbar spine tenderness.   Skin:     General: Skin is warm and dry.      Capillary Refill: Capillary refill takes less than 2 seconds.   Neurological:      General: No focal deficit present.      Mental Status: She is alert and oriented to person, place, and time.      Sensory: No sensory deficit.      Motor: Weakness present.   Psychiatric:         Mood and Affect: Mood normal.         Behavior: Behavior normal.         CT lumbar spine retrospective reconstruction protocol   Final Result   1. No acute intrathoracic abnormality.        2. Emphysematous change with component of chronic appearing   interstitial changes demonstrated.        3. No acute compression fracture within the thoracic or lumbar spine.        4. No acute intra-abdominal abnormality. No visceral injury.        5. No hip  fracture demonstrated. No fracture about the visualized   osseous pelvis or sacrum.        6. Bladder is distended measuring 12 cm nonspecific. Correlate with   patient's symptoms.        7. Moderate narrowing thecal sac L3/4 in relation to anterolisthesis   with findings as seen on prior imaging.             MACRO:   None        Signed by: Sebas Keane 6/27/2025 4:55 PM   Dictation workstation:   [id number]      CT thoracic spine retrospective reconstruction protocol   Final Result   1. No acute intrathoracic abnormality.        2. Emphysematous change with component of chronic appearing   interstitial changes demonstrated.        3. No acute compression fracture within the thoracic or lumbar spine.        4. No acute intra-abdominal abnormality. No visceral injury.        5. No hip fracture demonstrated. No fracture about the visualized   osseous pelvis or sacrum.        6. Bladder is distended measuring 12 cm nonspecific. Correlate with   patient's symptoms.        7. Moderate narrowing thecal sac L3/4 in relation to anterolisthesis   with findings as seen on prior imaging.             MACRO:   None        Signed by: Sebas Keane 6/27/2025 4:55 PM   Dictation workstation:   [id number]      CT chest abdomen pelvis w IV contrast   Final Result   1. No acute intrathoracic abnormality.        2. Emphysematous change with component of chronic appearing   interstitial changes demonstrated.        3. No acute compression fracture within the thoracic or lumbar spine.        4. No acute intra-abdominal abnormality. No visceral injury.        5. No hip fracture demonstrated. No fracture about the visualized   osseous pelvis or sacrum.        6. Bladder is distended measuring 12 cm nonspecific. Correlate with   patient's symptoms.        7. Moderate narrowing thecal sac L3/4 in relation to anterolisthesis   with findings as seen on prior imaging.             MACRO:   None        Signed by: Sebas Keane  6/27/2025 4:55 PM   Dictation workstation:   [id number]      XR hip left with pelvis when performed 2 or 3 views   Final Result   1.  No fracture or dislocation.   2. Degenerative changes, as described above.        MACRO:   None.        Signed by: Dheeraj Gil 6/27/2025 3:23 PM   Dictation workstation:   [id number]      Transthoracic Echo Complete    (Results Pending)       Labs Reviewed   CBC WITH AUTO DIFFERENTIAL - Abnormal       Result Value    WBC 12.0 (*)     nRBC 0.0      RBC 5.16      Hemoglobin 15.1      Hematocrit 45.2      MCV 88      MCH 29.3      MCHC 33.4      RDW 14.0      Platelets 243      Neutrophils % 74.8      Immature Granulocytes %, Automated 0.3      Lymphocytes % 16.4      Monocytes % 7.8      Eosinophils % 0.5      Basophils % 0.2      Neutrophils Absolute 8.99 (*)     Immature Granulocytes Absolute, Automated 0.04      Lymphocytes Absolute 1.97      Monocytes Absolute 0.94 (*)     Eosinophils Absolute 0.06      Basophils Absolute 0.03     COMPREHENSIVE METABOLIC PANEL - Abnormal    Glucose 106 (*)     Sodium 139      Potassium 3.4 (*)     Chloride 104      Bicarbonate 25      Anion Gap 13      Urea Nitrogen 16      Creatinine 0.40 (*)     eGFR >90      Calcium 9.2      Albumin 4.0      Alkaline Phosphatase 90      Total Protein 7.0      AST 41 (*)     Bilirubin, Total 0.9      ALT 24     CREATINE KINASE - Abnormal    Creatine Kinase 368 (*)    URINALYSIS WITH REFLEX CULTURE AND MICROSCOPIC - Abnormal    Color, Urine Yellow      Appearance, Urine Clear      Specific Gravity, Urine 1.032      pH, Urine 5.5      Protein, Urine 30 (1+) (*)     Glucose, Urine Normal      Blood, Urine NEGATIVE      Ketones, Urine 100 (3+) (*)     Bilirubin, Urine NEGATIVE      Urobilinogen, Urine Normal      Nitrite, Urine NEGATIVE      Leukocyte Esterase, Urine 25 Destinee/uL (*)    MICROSCOPIC ONLY, URINE - Abnormal    WBC, Urine 6-10 (*)     RBC, Urine 6-10 (*)     Squamous Epithelial Cells, Urine 1-9 (SPARSE)      BASIC METABOLIC PANEL - Abnormal    Glucose 113 (*)     Sodium 137      Potassium 3.5      Chloride 104      Bicarbonate 26      Anion Gap 11      Urea Nitrogen 21      Creatinine 0.52      eGFR 89      Calcium 8.5 (*)    MAGNESIUM - Normal    Magnesium 1.95     LACTATE - Normal    Lactate 1.4      Narrative:     Venipuncture immediately after or during the administration of Metamizole may lead to falsely low results. Testing should be performed immediately prior to Metamizole dosing.   PROTIME-INR - Normal    Protime 11.9      INR 1.1     CBC - Normal    WBC 10.3      nRBC 0.0      RBC 4.41      Hemoglobin 12.8      Hematocrit 39.2      MCV 89      MCH 29.0      MCHC 32.7      RDW 14.2      Platelets 221     URINE CULTURE   TYPE AND SCREEN    ABO TYPE O      Rh TYPE POS      ANTIBODY SCREEN NEG     URINALYSIS WITH REFLEX CULTURE AND MICROSCOPIC    Narrative:     The following orders were created for panel order Urinalysis with Reflex Culture and Microscopic.  Procedure                               Abnormality         Status                     ---------                               -----------         ------                     Urinalysis with Reflex C...[[id number]]  Abnormal            Final result               Extra Urine Gray Tube[[id number]]                            In process                   Please view results for these tests on the individual orders.   EXTRA URINE GRAY TUBE         ED Course & MDM   Diagnoses as of 06/28/25 1030   Urinary tract infection with hematuria, site unspecified   Fall, initial encounter           Medical Decision Making  All mentioned lab results, ECGs, and imaging were independently reviewed by myself  - Patient evaluated. Patient is presenting to the emergency department after a fall, was down for over a day.  Patient has a history of recurrent falls.,  Patient was just seen in the emergency department earlier in June after a fall and was ultimately discharged at that  time with musculoskeletal pain as well as a UTI and hypokalemia.  Patient tells me that she feels unsafe at home because of the multiple falls that she has been having and is requesting social work consultation for potential placement, she has been working with her primary care physician and attempt to do so, but things have been delayed she states.  I do want to rule out acute traumatic pathology given her mechanism of injury as well as the pain she is experiencing.  I also want to rule out another UTI, given the fact that she was on the ground for an extended period of time, I want a rule out rhabdomyolysis.  Patient was given analgesia, started on IV fluids, basic labs were obtained on the patient.  Imaging was also ordered.  Labs demonstrate +3 ketones in the urine consistent with some dehydration, CK is also mildly elevated although there is no evidence of acute kidney injury or transaminitis.  Patient has a mild leukocytosis which is likely reactive from the patient's trauma, lower suspicion for sepsis at this time as she is otherwise with a normal temperature, heart rate, and respiratory rate.  At the time of signout the patient is pending the results of her CT scans.  If the CTs are negative, she remains stable, PT and OT consultation in addition to social work consultation can be made.  If any positive findings for traumatic injuries are noted or she develops any new or worsening symptoms, admission to medicine can be considered.  She signed out to the oncoming team.    - Monitored for any changes in stability or symptomatology. Patient remained stable.   - Counseled regarding labs, imaging, diagnosis, and plan. Patient was agreeable. All questions were answered. The patient was receptive and agreeable to the plan of care.       *Disclaimer: This note was dictated by speech recognition. Minor errors in transcription may be present. Please call with questions.    Javier Mondragon MD             Your  medication list        ASK your doctor about these medications        Instructions Last Dose Given Next Dose Due   acebutolol 200 mg capsule  Commonly known as: Sectral      Take 1 capsule (200 mg) by mouth once daily.       diclofenac sodium 1 % gel  Commonly known as: Voltaren      Apply 4.5 inches (4 g) topically 4 times a day.       EpiPen Jr 0.15 mg/0.3 mL injection syringe  Generic drug: EPINEPHrine           fluticasone 50 mcg/actuation nasal spray  Commonly known as: Flonase      Administer 1 spray into each nostril once daily.       furosemide 40 mg tablet  Commonly known as: Lasix      Take 1 tablet (40 mg) by mouth once daily.       potassium chloride ER 10 mEq ER capsule  Commonly known as: Micro-K      Take 1 capsule (10 mEq) by mouth once daily.                Procedure  Procedures     *This report was transcribed using voice recognition software.  Every effort was made to ensure accuracy; however, inadvertent computerized transcription errors may be present.*  Juan Carlos Mondragon MD  06/28/25           [1]   Past Medical History:  Diagnosis Date    Diverticulosis of intestine, part unspecified, without perforation or abscess without bleeding     Diverticulosis    Dysphagia 07/05/2023    Esophageal reflux 07/05/2023    Food impaction of esophagus 07/05/2023    Fracture of radius 04/19/2022    Hyperpigmented skin lesion 07/05/2023    Osteoporosis     Personal history of other diseases of the musculoskeletal system and connective tissue     History of osteoarthritis    Personal history of other endocrine, nutritional and metabolic disease     History of obesity    Personal history of other specified conditions     History of insomnia    Radius fracture 03/13/2024    Rheumatic mitral valve disease, unspecified     Mitral valve problem    Ulna fracture 03/13/2024   [2]   Past Surgical History:  Procedure Laterality Date    BREAST LUMPECTOMY      CATARACT EXTRACTION      COLONOSCOPY      HYSTERECTOMY       OTHER SURGICAL HISTORY  06/04/2019    Hysterectomy    OTHER SURGICAL HISTORY  06/04/2019    Knee surgery    TONSILLECTOMY      UPPER GASTROINTESTINAL ENDOSCOPY     [3]   Family History  Problem Relation Name Age of Onset    Lung cancer Other     [4]   Social History  Tobacco Use    Smoking status: Never     Passive exposure: Never    Smokeless tobacco: Never   Vaping Use    Vaping status: Never Used   Substance Use Topics    Alcohol use: Never    Drug use: Never        Juan Carlos Mondragon MD  06/28/25 1032

## 2025-06-27 NOTE — H&P
[facility] - GENERAL MEDICINE HISTORY AND PHYSICAL    HISTORY OF PRESENT ILLNESS     History Obtained From (Primary Source): Patient  Collateral History (Secondary Sources): D/w ED physician    History Of Present Illness (HPI):  Nadja Rodriguez is a 89 y.o. female with PMHx s/f HTN, hypokalemia, osteoporosis, palpitations (presumed afib), Parkinsons presenting with weakness and fall. Pt lives by herself and suffered a fall in her kitchen about 48 hours ago while alone. She hit her head on the stove, but does not think she injured herself significant beyond this. She was unable to get up unfortunately and laid on the floor for about two days before her son finally found her. She has not taken any of her meds for two days as a result of this. Pt does not complain of severe pain in any joint or extremity, but does say her hips hurt a bit from laying on the floor for so long. No shortness of breath, cough, chest pain, fever, chills, abdominal pain, urinary issues, bowel issues, or other symptoms reported. 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 She reports general unsteadiness and occasionally falls over the past few months secondary to her Parkinsonism.    ED Course:   Vitals on presentation: T 36.2 °C (97.1 °F)  HR 87 -> 112 bpm  BP (!) 179/107 -> 92/58  RR 18  O2 96 % None (Room air)  Labs:   CBC with WBC 12.0, Hgb 15.1, Plts 243, absolute neutrophils 8.99  CMP with glucose 106, Na 139, K 3.4, BUN 16, sCr 0.40, alk phos 90, ALT 24, AST 41, bilirubin 0.9. Magnesium 1.95.   Lactate 1.4  INR 1.1  UA 1+ protein, 3+ ketones, 25 leuk est 6-10 WBCs  EKG: afib with RVR at 170 bpm (during rapid response)  Imaging - XR hip L with pelvis - 1.  No fracture or dislocation. 2. Degenerative changes, as described above.   CT c/a/p w/ contrast, CT thoracic and lumbar spine - 1. No acute  intrathoracic abnormality.   2. Emphysematous change with component of chronic appearing   interstitial changes demonstrated.   3. No acute compression fracture within the thoracic or lumbar spine.   4. No acute intra-abdominal abnormality. No visceral injury.   5. No hip fracture demonstrated. No fracture about the visualized osseous pelvis or sacrum.   6. Bladder is distended measuring 12 cm nonspecific. Correlate with patient's symptoms.   7. Moderate narrowing thecal sac L3/4 in relation to anterolisthesis with findings as seen on prior imaging.   agree with radiology interpretation(s):   Interventions: Pt given Toradol 15 mg IV X1, Lr 1 L bolus, D5 1/2 NS started at 75 ml/hr. RRT called around 1930 for new onset tachycardia. Pt asymptomatic with no chest pain or palpitations. EKG done showing afib with RVR at 170 bpm. Pt says she has a history of palpitations, but is not sure if she has afib. Not on any anticoagulation per her. Given 10 mg IV Cardizem and 5 mg IV Lopressor with improvement to 100 bpm. Mag sulfate 2 g Iv given. Will admit to stepdown and consider Cardizem drip if RVR continues.    12-point ROS reviewed and found to be negative aside from aforementioned positives in HPI and/or noted in dedicated ROS section below.     Decision made to admit the patient to the hospitalist service after evaluation of the patient, review of the above, and discussion with ED provider.     LABS AND IMAGING     I have personally reviewed the following labs from 06/27/25: CBC, CMP, Mag, UA, Lactate, and PT/INR  I have personally reviewed the following imaging studies from 06/27/25: XR hip L with pelvis, CT c/a/p w contrast, CT lumbar and thoracic spine, with my personal interpretations as documented in ED course above.   I have personally reviewed any obtained EKGs on 06/27/25, with my interpretation as listed above in the ED summary course.   I have personally reviewed the patient's vitals on presentation to the ED and  any/all changes through to time of admission (on 06/27/25).     ED Course (From ED Provider):  Diagnoses as of 06/27/25 1958   Urinary tract infection with hematuria, site unspecified   Fall, initial encounter     Relevant Results  Results for orders placed or performed during the hospital encounter of 06/27/25 (from the past 24 hours)   CBC and Auto Differential   Result Value Ref Range    WBC 12.0 (H) 4.4 - 11.3 x10*3/uL    nRBC 0.0 0.0 - 0.0 /100 WBCs    RBC 5.16 4.00 - 5.20 x10*6/uL    Hemoglobin 15.1 12.0 - 16.0 g/dL    Hematocrit 45.2 36.0 - 46.0 %    MCV 88 80 - 100 fL    MCH 29.3 26.0 - 34.0 pg    MCHC 33.4 32.0 - 36.0 g/dL    RDW 14.0 11.5 - 14.5 %    Platelets 243 150 - 450 x10*3/uL    Neutrophils % 74.8 40.0 - 80.0 %    Immature Granulocytes %, Automated 0.3 0.0 - 0.9 %    Lymphocytes % 16.4 13.0 - 44.0 %    Monocytes % 7.8 2.0 - 10.0 %    Eosinophils % 0.5 0.0 - 6.0 %    Basophils % 0.2 0.0 - 2.0 %    Neutrophils Absolute 8.99 (H) 1.60 - 5.50 x10*3/uL    Immature Granulocytes Absolute, Automated 0.04 0.00 - 0.50 x10*3/uL    Lymphocytes Absolute 1.97 0.80 - 3.00 x10*3/uL    Monocytes Absolute 0.94 (H) 0.05 - 0.80 x10*3/uL    Eosinophils Absolute 0.06 0.00 - 0.40 x10*3/uL    Basophils Absolute 0.03 0.00 - 0.10 x10*3/uL   Comprehensive metabolic panel   Result Value Ref Range    Glucose 106 (H) 74 - 99 mg/dL    Sodium 139 136 - 145 mmol/L    Potassium 3.4 (L) 3.5 - 5.3 mmol/L    Chloride 104 98 - 107 mmol/L    Bicarbonate 25 21 - 32 mmol/L    Anion Gap 13 10 - 20 mmol/L    Urea Nitrogen 16 6 - 23 mg/dL    Creatinine 0.40 (L) 0.50 - 1.05 mg/dL    eGFR >90 >60 mL/min/1.73m*2    Calcium 9.2 8.6 - 10.3 mg/dL    Albumin 4.0 3.4 - 5.0 g/dL    Alkaline Phosphatase 90 33 - 136 U/L    Total Protein 7.0 6.4 - 8.2 g/dL    AST 41 (H) 9 - 39 U/L    Bilirubin, Total 0.9 0.0 - 1.2 mg/dL    ALT 24 7 - 45 U/L   Magnesium   Result Value Ref Range    Magnesium 1.95 1.60 - 2.40 mg/dL   Lactate   Result Value Ref Range     Lactate 1.4 0.4 - 2.0 mmol/L   Protime-INR   Result Value Ref Range    Protime 11.9 9.8 - 12.4 seconds    INR 1.1 0.9 - 1.1   Type And Screen   Result Value Ref Range    ABO TYPE O     Rh TYPE POS     ANTIBODY SCREEN NEG    Creatine Kinase   Result Value Ref Range    Creatine Kinase 368 (H) 0 - 215 U/L   Urinalysis with Reflex Culture and Microscopic   Result Value Ref Range    Color, Urine Yellow Light-Yellow, Yellow, Dark-Yellow    Appearance, Urine Clear Clear    Specific Gravity, Urine 1.032 1.005 - 1.035    pH, Urine 5.5 5.0, 5.5, 6.0, 6.5, 7.0, 7.5, 8.0    Protein, Urine 30 (1+) (A) NEGATIVE, 10 (TRACE), 20 (TRACE) mg/dL    Glucose, Urine Normal Normal mg/dL    Blood, Urine NEGATIVE NEGATIVE mg/dL    Ketones, Urine 100 (3+) (A) NEGATIVE mg/dL    Bilirubin, Urine NEGATIVE NEGATIVE mg/dL    Urobilinogen, Urine Normal Normal mg/dL    Nitrite, Urine NEGATIVE NEGATIVE    Leukocyte Esterase, Urine 25 [place]/uL (A) NEGATIVE   Microscopic Only, Urine   Result Value Ref Range    WBC, Urine 6-10 (A) 1-5, NONE /HPF    RBC, Urine 6-10 (A) NONE, 1-2, 3-5 /HPF    Squamous Epithelial Cells, Urine 1-9 (SPARSE) Reference range not established. /HPF      Imaging  CT chest abdomen pelvis w IV contrast  Result Date: 6/27/2025  1. No acute intrathoracic abnormality.   2. Emphysematous change with component of chronic appearing interstitial changes demonstrated.   3. No acute compression fracture within the thoracic or lumbar spine.   4. No acute intra-abdominal abnormality. No visceral injury.   5. No hip fracture demonstrated. No fracture about the visualized osseous pelvis or sacrum.   6. Bladder is distended measuring 12 cm nonspecific. Correlate with patient's symptoms.   7. Moderate narrowing thecal sac L3/4 in relation to anterolisthesis with findings as seen on prior imaging.     MACRO: None   Signed by: Sebas Keane 6/27/2025 4:55 PM Dictation workstation:   [id number]    CT lumbar spine retrospective reconstruction  protocol  Result Date: 6/27/2025  1. No acute intrathoracic abnormality.   2. Emphysematous change with component of chronic appearing interstitial changes demonstrated.   3. No acute compression fracture within the thoracic or lumbar spine.   4. No acute intra-abdominal abnormality. No visceral injury.   5. No hip fracture demonstrated. No fracture about the visualized osseous pelvis or sacrum.   6. Bladder is distended measuring 12 cm nonspecific. Correlate with patient's symptoms.   7. Moderate narrowing thecal sac L3/4 in relation to anterolisthesis with findings as seen on prior imaging.     MACRO: None   Signed by: Sebas Keane 6/27/2025 4:55 PM Dictation workstation:   [id number]    CT thoracic spine retrospective reconstruction protocol  Result Date: 6/27/2025  1. No acute intrathoracic abnormality.   2. Emphysematous change with component of chronic appearing interstitial changes demonstrated.   3. No acute compression fracture within the thoracic or lumbar spine.   4. No acute intra-abdominal abnormality. No visceral injury.   5. No hip fracture demonstrated. No fracture about the visualized osseous pelvis or sacrum.   6. Bladder is distended measuring 12 cm nonspecific. Correlate with patient's symptoms.   7. Moderate narrowing thecal sac L3/4 in relation to anterolisthesis with findings as seen on prior imaging.     MACRO: None   Signed by: Sebas Keane 6/27/2025 4:55 PM Dictation workstation:   [id number]    XR hip left with pelvis when performed 2 or 3 views  Result Date: 6/27/2025  1.  No fracture or dislocation. 2. Degenerative changes, as described above.   MACRO: None.   Signed by: Dheeraj Gil 6/27/2025 3:23 PM Dictation workstation:   [id number]      Cardiology, Vascular, and Other Imaging  No other imaging results found for the past 2 days       PAST HISTORIES AND ALLERGIES     Past Medical History  She has a past medical history of Diverticulosis of intestine, part unspecified,  without perforation or abscess without bleeding, Dysphagia (07/05/2023), Esophageal reflux (07/05/2023), Food impaction of esophagus (07/05/2023), Fracture of radius (04/19/2022), Hyperpigmented skin lesion (07/05/2023), Osteoporosis, Personal history of other diseases of the musculoskeletal system and connective tissue, Personal history of other endocrine, nutritional and metabolic disease, Personal history of other specified conditions, Radius fracture (03/13/2024), Rheumatic mitral valve disease, unspecified, and Ulna fracture (03/13/2024).    Surgical History  She has a past surgical history that includes Other surgical history (06/04/2019); Other surgical history (06/04/2019); Hysterectomy; Breast lumpectomy; Cataract extraction; Colonoscopy; Upper gastrointestinal endoscopy; and Tonsillectomy.     Social History  She reports that she has never smoked. She has never been exposed to tobacco smoke. She has never used smokeless tobacco. She reports that she does not drink alcohol and does not use drugs.    Family History  Family History[1]    Allergies  Aspirin, Bee venom protein (honey bee), Flu vac 2020 [id number](pf), and Wheat    MEDICATIONS     Scheduled Medications:  Scheduled Medications[2]  Continuous Medications:  Continuous Medications[3]  PRN Medications:  PRN Medications[4]     REVIEW OF SYSTEMS     Review of Systems   Constitutional:  Negative for activity change, appetite change, chills, diaphoresis, fatigue and fever.   HENT:  Negative for congestion, ear pain, rhinorrhea, sinus pain and sore throat.    Respiratory:  Negative for apnea, cough, chest tightness, shortness of breath, wheezing and stridor.    Cardiovascular:  Negative for chest pain, palpitations and leg swelling.   Gastrointestinal:  Negative for abdominal distention, abdominal pain, constipation, diarrhea, nausea and vomiting.   Genitourinary:  Negative for difficulty urinating, dysuria, flank pain, frequency, hematuria and  urgency.   Musculoskeletal:  Positive for arthralgias. Negative for back pain, gait problem, joint swelling and myalgias.   Skin:  Negative for color change, pallor, rash and wound.   Neurological:  Positive for weakness. Negative for dizziness, syncope, light-headedness, numbness and headaches.   Psychiatric/Behavioral:  Negative for agitation, behavioral problems, confusion and decreased concentration. The patient is not nervous/anxious.    All other systems reviewed and are negative.      OBJECTIVE     Last Recorded Vitals  /59   Pulse (!) 105   Temp 36.2 °C (97.1 °F)   Resp 16   Wt 59 kg (130 lb)   SpO2 98%      Physical Exam:  Vital signs and nursing notes reviewed.   Constitutional: Pleasant and cooperative. Laying in bed in no acute distress. Conversant.   Skin: Warm and dry; no obvious lesions, rashes, pallor, or jaundice.   Eyes: EOMI. Anicteric sclera.   ENT: Mucous membranes moist; no obvious injury or deformity appreciated.   Head and Neck: Normocephalic, atraumatic. ROM preserved. Trachea midline. No appreciable JVD.   Respiratory: Nonlabored on RA. Lungs clear to auscultation bilaterally without obvious adventitious sounds. Chest rise is equal.  Cardiovascular: irregularly irregular tachycardia. No gross murmur, gallop, or rub. Extremities are warm and well-perfused with good capillary refill (< 3 seconds). No chest wall tenderness.   GI: Abdomen soft, nontender, nondistended. No obvious organomegaly appreciated. Bowel sounds are present.  : No CVA tenderness.   MSK: No limitations to AROM/PROM appreciated. L wrist contracture and pain with passive/active ROM.  Extremities: No cyanosis, edema, or clubbing evident. Neurovascularly intact.   Neuro: A&Ox3. CN 2-12 grossly intact. Able to respond to questions appropriately and clearly. No acute focal neurologic deficits appreciated.  Psych: Appropriate mood and behavior.    ASSESSMENT AND PLAN   Assessment/Plan     89 y.o. female with PMHx s/f  HTN, hypokalemia, osteoporosis, palpitations (presumed afib), Parkinsons presenting with weakness and fall.    Plan:  Admit to SDU    Weakness, fall, UTI:  UA shows 25 leuk est with light WBCs. Rocephin started. Ucx pending. Pt does not meet septic criteria on admission.  PT/OT/SS consulted    HTN, afib with Rvr:  EKG during rapid response shows afib with RVR.  Cardizem 10 mg IV and Lopressor 5 mg IV given.  Will admit to SDU and consider Cardizem drip if tachycardia continues.  TTE ordered  CHADS-VASC is 4 (age, female, hx HTN) - will start therapeutic Lovenox. Pt not on any anticoagulation per her.  Cardiology consulted.  Hold home Lasix due to dehydration. Monitor blood pressures. Continue home Sectral (beta-blocker).    Parkinsonism/tremors - pt will need to establish with neurology outpatient to further manage this.    Diet: Regular  DVT Prophylaxis: Therapeutic Lovenox  Code Status: Full Code   Case Discussed With: ED provider  Additional Sources Reviewed: ED note day of admission; PCP notes    Anticipated Length of Stay (LOS): Patient will require two-plus midnight stay for further evaluation and management of the above.      Ramón Nunez DO    Dragon dictation software was used to dictate this note and thus there may be minor errors in translation/transcription including garbled speech or misspellings. Please contact for clarification if needed.       [1]   Family History  Problem Relation Name Age of Onset    Lung cancer Other     [2] acebutolol, 200 mg, oral, Daily  cefTRIAXone, 1 g, intravenous, Once  [START ON 6/28/2025] cefTRIAXone, 1 g, intravenous, q24h  dilTIAZem, 10 mg, intravenous, Once  enoxaparin, 1 mg/kg, subcutaneous, q12h  fluticasone, 1 spray, Each Nostril, Daily  magnesium sulfate, 2 g, intravenous, Once  metoprolol, 5 mg, intravenous, Once  [START ON 6/28/2025] pantoprazole, 40 mg, oral, Daily before breakfast   Or  [START ON 6/28/2025] pantoprazole, 40 mg, intravenous, Daily before  breakfast  potassium chloride CR, 20 mEq, oral, Daily  [START ON 6/28/2025] potassium chloride ER, 10 mEq, oral, Daily    [3] dextrose 5%-0.45 % sodium chloride, 75 mL/hr    [4] PRN medications: guaiFENesin, melatonin, ondansetron **OR** ondansetron

## 2025-06-28 ENCOUNTER — APPOINTMENT (OUTPATIENT)
Dept: CARDIOLOGY | Facility: HOSPITAL | Age: 89
End: 2025-06-28
Payer: MEDICARE

## 2025-06-28 LAB
ANION GAP SERPL CALC-SCNC: 11 MMOL/L
AORTIC VALVE MEAN GRADIENT: 3 MMHG
AORTIC VALVE PEAK VELOCITY: 1.16 M/S
AV PEAK GRADIENT: 5 MMHG
AVA (PEAK VEL): 1.89 CM2
AVA (VTI): 1.88 CM2
BUN SERPL-MCNC: 21 MG/DL (ref 6–23)
CALCIUM SERPL-MCNC: 8.5 MG/DL (ref 8.6–10.3)
CHLORIDE SERPL-SCNC: 104 MMOL/L (ref 98–107)
CO2 SERPL-SCNC: 26 MMOL/L (ref 21–32)
CREAT SERPL-MCNC: 0.52 MG/DL (ref 0.5–1.05)
EGFRCR SERPLBLD CKD-EPI 2021: 89 ML/MIN/1.73M*2
EJECTION FRACTION APICAL 4 CHAMBER: 66.4
EJECTION FRACTION: 63 %
ERYTHROCYTE [DISTWIDTH] IN BLOOD BY AUTOMATED COUNT: 14.2 % (ref 11.5–14.5)
GLOBAL LONGITUDINAL STRAIN: 19 %
GLUCOSE SERPL-MCNC: 113 MG/DL (ref 74–99)
HCT VFR BLD AUTO: 39.2 % (ref 36–46)
HGB BLD-MCNC: 12.8 G/DL (ref 12–16)
LEFT ATRIUM VOLUME AREA LENGTH INDEX BSA: 25.7 ML/M2
LEFT VENTRICLE INTERNAL DIMENSION DIASTOLE: 4.3 CM (ref 3.5–6)
LEFT VENTRICULAR OUTFLOW TRACT DIAMETER: 1.69 CM
LV EJECTION FRACTION BIPLANE: 72 %
MCH RBC QN AUTO: 29 PG (ref 26–34)
MCHC RBC AUTO-ENTMCNC: 32.7 G/DL (ref 32–36)
MCV RBC AUTO: 89 FL (ref 80–100)
MITRAL VALVE E/A RATIO: 0.73
MITRAL VALVE E/E' RATIO: 9.37
NRBC BLD-RTO: 0 /100 WBCS (ref 0–0)
PLATELET # BLD AUTO: 221 X10*3/UL (ref 150–450)
POTASSIUM SERPL-SCNC: 3.5 MMOL/L (ref 3.5–5.3)
RBC # BLD AUTO: 4.41 X10*6/UL (ref 4–5.2)
RIGHT VENTRICLE FREE WALL PEAK S': 16.44 CM/S
RIGHT VENTRICLE PEAK SYSTOLIC PRESSURE: 19 MMHG
SODIUM SERPL-SCNC: 137 MMOL/L (ref 136–145)
TRICUSPID ANNULAR PLANE SYSTOLIC EXCURSION: 2.1 CM
WBC # BLD AUTO: 10.3 X10*3/UL (ref 4.4–11.3)

## 2025-06-28 PROCEDURE — 97165 OT EVAL LOW COMPLEX 30 MIN: CPT | Mod: GO

## 2025-06-28 PROCEDURE — 2500000002 HC RX 250 W HCPCS SELF ADMINISTERED DRUGS (ALT 637 FOR MEDICARE OP, ALT 636 FOR OP/ED): Performed by: STUDENT IN AN ORGANIZED HEALTH CARE EDUCATION/TRAINING PROGRAM

## 2025-06-28 PROCEDURE — 2060000001 HC INTERMEDIATE ICU ROOM DAILY

## 2025-06-28 PROCEDURE — 85027 COMPLETE CBC AUTOMATED: CPT | Performed by: STUDENT IN AN ORGANIZED HEALTH CARE EDUCATION/TRAINING PROGRAM

## 2025-06-28 PROCEDURE — 99233 SBSQ HOSP IP/OBS HIGH 50: CPT | Performed by: INTERNAL MEDICINE

## 2025-06-28 PROCEDURE — 93306 TTE W/DOPPLER COMPLETE: CPT

## 2025-06-28 PROCEDURE — 2500000004 HC RX 250 GENERAL PHARMACY W/ HCPCS (ALT 636 FOR OP/ED): Performed by: INTERNAL MEDICINE

## 2025-06-28 PROCEDURE — 36415 COLL VENOUS BLD VENIPUNCTURE: CPT | Performed by: STUDENT IN AN ORGANIZED HEALTH CARE EDUCATION/TRAINING PROGRAM

## 2025-06-28 PROCEDURE — 93306 TTE W/DOPPLER COMPLETE: CPT | Performed by: INTERNAL MEDICINE

## 2025-06-28 PROCEDURE — 80048 BASIC METABOLIC PNL TOTAL CA: CPT | Performed by: STUDENT IN AN ORGANIZED HEALTH CARE EDUCATION/TRAINING PROGRAM

## 2025-06-28 PROCEDURE — 99222 1ST HOSP IP/OBS MODERATE 55: CPT | Performed by: INTERNAL MEDICINE

## 2025-06-28 PROCEDURE — 2500000001 HC RX 250 WO HCPCS SELF ADMINISTERED DRUGS (ALT 637 FOR MEDICARE OP): Performed by: INTERNAL MEDICINE

## 2025-06-28 PROCEDURE — 82374 ASSAY BLOOD CARBON DIOXIDE: CPT | Performed by: STUDENT IN AN ORGANIZED HEALTH CARE EDUCATION/TRAINING PROGRAM

## 2025-06-28 PROCEDURE — 2500000001 HC RX 250 WO HCPCS SELF ADMINISTERED DRUGS (ALT 637 FOR MEDICARE OP): Performed by: STUDENT IN AN ORGANIZED HEALTH CARE EDUCATION/TRAINING PROGRAM

## 2025-06-28 PROCEDURE — 97161 PT EVAL LOW COMPLEX 20 MIN: CPT | Mod: GP

## 2025-06-28 PROCEDURE — 2500000004 HC RX 250 GENERAL PHARMACY W/ HCPCS (ALT 636 FOR OP/ED): Performed by: STUDENT IN AN ORGANIZED HEALTH CARE EDUCATION/TRAINING PROGRAM

## 2025-06-28 PROCEDURE — 93356 MYOCRD STRAIN IMG SPCKL TRCK: CPT | Performed by: INTERNAL MEDICINE

## 2025-06-28 RX ORDER — METOPROLOL TARTRATE 25 MG/1
25 TABLET, FILM COATED ORAL 2 TIMES DAILY
Status: DISCONTINUED | OUTPATIENT
Start: 2025-06-28 | End: 2025-06-28

## 2025-06-28 RX ORDER — POLYETHYLENE GLYCOL 3350 17 G/17G
17 POWDER, FOR SOLUTION ORAL DAILY PRN
Status: DISPENSED | OUTPATIENT
Start: 2025-06-28

## 2025-06-28 RX ADMIN — FLUTICASONE PROPIONATE 1 SPRAY: 50 SPRAY, METERED NASAL at 07:54

## 2025-06-28 RX ADMIN — POLYETHYLENE GLYCOL 3350 17 G: 17 POWDER, FOR SOLUTION ORAL at 15:53

## 2025-06-28 RX ADMIN — PANTOPRAZOLE SODIUM 40 MG: 40 TABLET, DELAYED RELEASE ORAL at 05:34

## 2025-06-28 RX ADMIN — APIXABAN 5 MG: 5 TABLET, FILM COATED ORAL at 21:34

## 2025-06-28 RX ADMIN — POTASSIUM CHLORIDE 20 MEQ: 1500 TABLET, EXTENDED RELEASE ORAL at 07:53

## 2025-06-28 RX ADMIN — ENOXAPARIN SODIUM 60 MG: 60 INJECTION SUBCUTANEOUS at 07:52

## 2025-06-28 RX ADMIN — ACEBUTOLOL HYDROCHLORIDE 200 MG: 200 CAPSULE ORAL at 07:53

## 2025-06-28 RX ADMIN — CEFTRIAXONE 1 G: 1 INJECTION, SOLUTION INTRAVENOUS at 21:35

## 2025-06-28 ASSESSMENT — PAIN SCALES - GENERAL
PAINLEVEL_OUTOF10: 0 - NO PAIN
PAINLEVEL_OUTOF10: 0 - NO PAIN

## 2025-06-28 ASSESSMENT — COGNITIVE AND FUNCTIONAL STATUS - GENERAL
MOVING FROM LYING ON BACK TO SITTING ON SIDE OF FLAT BED WITH BEDRAILS: A LOT
TURNING FROM BACK TO SIDE WHILE IN FLAT BAD: A LOT
DAILY ACTIVITIY SCORE: 13
TURNING FROM BACK TO SIDE WHILE IN FLAT BAD: A LOT
MOVING FROM LYING ON BACK TO SITTING ON SIDE OF FLAT BED WITH BEDRAILS: A LITTLE
EATING MEALS: A LITTLE
TOILETING: A LOT
CLIMB 3 TO 5 STEPS WITH RAILING: TOTAL
TOILETING: A LOT
MOBILITY SCORE: 11
MOVING TO AND FROM BED TO CHAIR: A LOT
MOVING TO AND FROM BED TO CHAIR: A LOT
STANDING UP FROM CHAIR USING ARMS: A LOT
EATING MEALS: A LITTLE
PERSONAL GROOMING: A LITTLE
WALKING IN HOSPITAL ROOM: A LOT
WALKING IN HOSPITAL ROOM: A LOT
DRESSING REGULAR UPPER BODY CLOTHING: A LOT
DRESSING REGULAR LOWER BODY CLOTHING: A LOT
DRESSING REGULAR UPPER BODY CLOTHING: A LOT
HELP NEEDED FOR BATHING: A LOT
PERSONAL GROOMING: A LITTLE
HELP NEEDED FOR BATHING: A LOT
CLIMB 3 TO 5 STEPS WITH RAILING: TOTAL
MOBILITY SCORE: 12
STANDING UP FROM CHAIR USING ARMS: A LOT
DAILY ACTIVITIY SCORE: 14
DRESSING REGULAR LOWER BODY CLOTHING: TOTAL

## 2025-06-28 ASSESSMENT — ACTIVITIES OF DAILY LIVING (ADL)
ADL_ASSISTANCE: INDEPENDENT
ADL_ASSISTANCE: INDEPENDENT
BATHING_ASSISTANCE: MAXIMAL

## 2025-06-28 ASSESSMENT — PAIN - FUNCTIONAL ASSESSMENT
PAIN_FUNCTIONAL_ASSESSMENT: 0-10
PAIN_FUNCTIONAL_ASSESSMENT: UNABLE TO SELF-REPORT

## 2025-06-28 NOTE — PROGRESS NOTES
Nadja Rodriguez is a 89 y.o. female on day 1 of admission presenting with Urinary tract infection with hematuria, site unspecified.      Subjective   Nadja Rodriguez is a 89 y.o. female with PMHx s/f HTN, hypokalemia, osteoporosis, palpitations (presumed afib), Parkinsons presenting with weakness and fall.  Initial workup in ER shows tachycardia as well as elevated blood pressure, mild leukocytosis with WBC of 12, UA suggestive of UTI.  She was admitted with a diagnosis of new onset of A-fib with RVR as well as UTI leading to weakness and fall.  Status post Cardizem IV push in ER, started on IV Rocephin, Lovenox at therapeutic dose, IV fluid.  Echocardiogram ordered and cardiology consulted.     6/28/2025 patient was evaluate this morning, feeling better, heart rate fairly controlled.  Denies having chest pain or shortness of breath at rest.       Objective     Last Recorded Vitals  /72 (BP Location: Right leg, Patient Position: Lying)   Pulse 85   Temp 36.7 °C (98.1 °F) (Temporal)   Resp 18   Wt 60.7 kg (133 lb 13.1 oz)   SpO2 94%   Intake/Output last 3 Shifts:    Intake/Output Summary (Last 24 hours) at 6/28/2025 0733  Last data filed at 6/28/2025 0530  Gross per 24 hour   Intake 1766.25 ml   Output --   Net 1766.25 ml       Admission Weight  Weight: 59 kg (130 lb) (06/27/25 1405)    Daily Weight  06/28/25 : 60.7 kg (133 lb 13.1 oz)    Image Results  CT chest abdomen pelvis w IV contrast, CT lumbar spine retrospective reconstruction protocol, CT thoracic spine retrospective reconstruction protocol  Narrative: Interpreted By:  Sebas Keane,   STUDY:  CT CHEST ABDOMEN PELVIS W IV CONTRAST; CT LUMBAR SPINE RETROSPECTIVE  RECONSTRUCTION PROTOCOL; CT THORACIC SPINE RETROSPECTIVE  RECONSTRUCTION PROTOCOL; ;  6/27/2025 4:10 pm; 6/27/2025 4:15 pm      INDICATION:  Signs/Symptoms:fall back/pelvic/hip pain.          COMPARISON:  None.      ACCESSION NUMBER(S):  [id number]; [id number]; [id number]       ORDERING CLINICIAN:  SAMANTHA DYE      TECHNIQUE:  Serial axial CT images obtained of the chest, abdomen, and pelvis  following intravenous administration of the 75 mL of Omnipaque 350.  Images reformatted in the coronal and sagittal projection. Also,  retrospective reconstruction imaging performed of the thoracic and  lumbar spine in the axial, coronal, and sagittal projection.      All CT examinations are performed with 1 or more of the following  dose reduction techniques: Automated exposure control, adjustment of  mA and/or kv according to patient's size, or use of iterative  reconstruction techniques.      FINDINGS:  CT chest:      Mediastinum demonstrates demonstrates no lymphadenopathy. There is a  no hilar lymphadenopathy. Esophagus is unremarkable. Small sliding  hiatal hernia demonstrated.      Heart and great vessels demonstrate ascending thoracic aorta to  measure 3.5 cm upper limits of normal. Mild vascular calcification of  the thoracic aorta. Main pulmonary artery is unremarkable. Central  pulmonary arteries are unremarkable.      Lung parenchyma demonstrates chronic appearing basilar interstitial  changes. No focal infiltrate or effusion identified. Bilateral  basilar scar and component of bronchiectasis demonstrated. No  evidence for pneumothorax.      Visualized osseous structures demonstrate no evidence for displaced  rib fracture.      CT thoracic spine:      Component of S shaped scoliosis is demonstrated within the visualized  spine. Vertebral bodies within the thoracic spine demonstrate no  evidence for compression deformity. Mild multilevel anterior  osteophyte formation lower thoracic spine with loss of disc space  height demonstrated. Posterior spinous processes demonstrate no  evidence for fracture. There is no narrowing of the spinal canal  within the thoracic spine.      CT abdomen:      Liver is unremarkable      Spleen demonstrates hypodensity within the spleen with splenic  cyst  suggested identified measuring 8 mm      Adrenal glands are unremarkable      Gallbladder is distended. There is noncalcified stone in the  gallbladder lumen identified measuring 2.1 cm.      Pancreas is unremarkable      Right kidney demonstrates a cyst in the inferior pole posteriorly  measuring 11 mm      Left kidney is unremarkable      Retroperitoneum demonstrates no lymphadenopathy. Aorta is tortuous.  There is a mild vascular calcification of the abdominal aorta. No  aneurysm.      Loops of large bowel demonstrate uncomplicated sigmoid  diverticulosis. Small bowel loops are nondilated. There is a no  lymphadenopathy in the mesentery. Stomach is contracted.      CT pelvis:      Unopacified bladder is distended measuring 12.0 cm in the  craniocaudal dimension. There is no pelvic lymphadenopathy. No free  fluid. Status post hysterectomy.      Visualized osseous structures demonstrate unremarkable visualized  pelvis. Pubic rami are unremarkable. Sacrum demonstrates no evidence  for fracture. There is mild bilateral SI joint osteoarthritis. Mild  hip joint osteoarthritis demonstrated. There is no hip fracture.      CT lumbar spine:      Alignment of the lumbar spine demonstrates anterolisthesis of L3 on  L4 measuring 8 mm as seen on prior imaging. There is a severe loss  disc space height at L3/4 with endplate sclerosis demonstrated.  Vertebral body heights are maintained. No compression deformity  noted. Facet arthropathy lower lumbar spine. There is no evidence for  pars defect. Moderate narrowing thecal sac L3/4. Neural foramina  demonstrate mild bilateral narrowing L3/4.                      Impression: 1. No acute intrathoracic abnormality.      2. Emphysematous change with component of chronic appearing  interstitial changes demonstrated.      3. No acute compression fracture within the thoracic or lumbar spine.      4. No acute intra-abdominal abnormality. No visceral injury.      5. No hip fracture  demonstrated. No fracture about the visualized  osseous pelvis or sacrum.      6. Bladder is distended measuring 12 cm nonspecific. Correlate with  patient's symptoms.      7. Moderate narrowing thecal sac L3/4 in relation to anterolisthesis  with findings as seen on prior imaging.          MACRO:  None      Signed by: Sebas Keane 6/27/2025 4:55 PM  Dictation workstation:   [id number]  XR hip left with pelvis when performed 2 or 3 views  Narrative: Interpreted By:  Dheeraj Gil,   STUDY:  XR HIP LEFT WITH PELVIS WHEN PERFORMED 2 OR 3 VIEWS 6/27/2025 2:58 pm      INDICATION:  Signs/Symptoms:fall, hip and pelvic pain      COMPARISON:  06/08/2025      ACCESSION NUMBER(S):  [id number]      ORDERING CLINICIAN:  SAMANTHA DYE      TECHNIQUE:  A single AP view of the pelvis as well as AP and lateral views of the  left hip were obtained.      FINDINGS:  There is no evidence of acute fracture or dislocation identified.  Mild hypertrophic degenerative changes are seen in the sacroiliac  joints bilaterally. Mild joint space narrowing and tiny marginal  osteophytes are seen in the hips bilaterally. Rounded calcifications  are seen with throughout the pelvis, most consistent with phleboliths.      Impression: 1.  No fracture or dislocation.  2. Degenerative changes, as described above.      MACRO:  None.      Signed by: Dheeraj Gil 6/27/2025 3:23 PM  Dictation workstation:   [id number]      Physical Exam  Patient is awake and orient, not in apparent distress  Eyes: PERRLA, no conjunctival congestion  Chest: Bilateral Air entry, no crackles or wheezing  Heart: s1S2 regular, no murmur  Abdomen: Soft, non tender, BS present  Ext: Deformed left hand  Relevant Results               This patient currently has cardiac telemetry ordered; if you would like to modify or discontinue the telemetry order, click here to go to the orders activity to modify/discontinue the order.              Assessment & Plan    A-fib with  RVR  On acebutolol for rate control as well as Lovenox for anticoagulation  Follow echocardiogram  Cardiology consulted    Acute cystitis with hematuria  On IV Rocephin  Follow cultures    Hypertension  Continue current medication, monitor blood pressure and adjust as needed.    Physical debility/deconditioning  PT/OT     Parkinsonism  Not on any medication  Neurology follow-up as an outpatient    Neyda Galvan MD

## 2025-06-28 NOTE — CONSULTS
[facility]    Patient Name: Nadja Rodriguez  Patient : 1935  Room/Bed: -A    Date: 25  Time: 9:07 AM    Referred by Dr. Ku ref. provider found for Fall (Fall at home  Wednesday at 1600. PT fell backwards and was stuck on the floor since. Pt was checked on by family today and brought to the ED. PT Aox4. No blood thinners noted in pt's chart or taken per pt)     History Of Present Illness:    Nadja Rodriguez is a 89 y.o. female admitted after a fall at home where she was unable to get up for approximately 2 days.  Patient was subsequently brought to the emergency department for evaluation.  Patient had a rapid response where she was noted to have atrial fibrillation with RVR and a heart rate of 170 bpm.  Cardiology was consulted for additional recommendations regarding her atrial fibrillation.  Telemetry currently showing sinus rhythm with heart rate in the 70s.  BMP showed a serum sodium 137, serum potassium 3.5, serum creatinine is 0.52.  Serum magnesium was 1.95.  CK was 368.  CBC showed hemoglobin 12.8. Hip x-ray showed no fracture or dislocation with degenerative changes.  CT scan of the chest/abdomen/pelvis showed no acute intrathoracic abnormality, emphysematous change with component of chronic appearing interstitial changes demonstrated, no acute compression fracture within the thoracic or lumbar spine, no acute intra-abdominal abnormality, no hip fracture demonstrated, bladder is distended, moderate narrowing thecal sac L3/4.  During my exam patient was resting comfortably in bed.    Assessment/Plan:   1.  Paroxysmal atrial fibrillation  Patient reported to have an episode of paroxysmal atrial fibrillation which spontaneously resolved.  Telemetry currently showing sinus rhythm with heart rate in the 70s.  She does have an elevated [id number] score and has been placed on enoxaparin.  This can be changed to DOAC therapy such as apixaban 5 mg twice daily  prior to discharge.  Check echocardiogram.  Patient is on acebutolol for heart rate control which can be continued.  Continue to monitor on telemetry while here.    2.  Hypertension  The patient has a history of hypertension which appears controlled this morning.  Continue to monitor and adjust antihypertensive medical therapy as necessary.    3.  Fall/weakness  Patient had a fall at home and was unable to get up for approximately 48 hours.  Imaging did not show any fractures.  Management per hospitalist service.    4.  History of parkinsonism  Management per hospitalist service    Past Medical History:  She has a past medical history of Diverticulosis of intestine, part unspecified, without perforation or abscess without bleeding, Dysphagia (07/05/2023), Esophageal reflux (07/05/2023), Food impaction of esophagus (07/05/2023), Fracture of radius (04/19/2022), Hyperpigmented skin lesion (07/05/2023), Osteoporosis, Personal history of other diseases of the musculoskeletal system and connective tissue, Personal history of other endocrine, nutritional and metabolic disease, Personal history of other specified conditions, Radius fracture (03/13/2024), Rheumatic mitral valve disease, unspecified, and Ulna fracture (03/13/2024).    Past Surgical History:  She has a past surgical history that includes Other surgical history (06/04/2019); Other surgical history (06/04/2019); Hysterectomy; Breast lumpectomy; Cataract extraction; Colonoscopy; Upper gastrointestinal endoscopy; and Tonsillectomy.      Social History:  She reports that she has never smoked. She has never been exposed to tobacco smoke. She has never used smokeless tobacco. She reports that she does not drink alcohol and does not use drugs.    Family History:  Family History[1]     Allergies:  Aspirin, Bee venom protein (honey bee), Flu vac 2020 [id number](pf), and Wheat    Outpatient Medications:  Current Outpatient Medications   Medication Instructions     acebutolol (SECTRAL) 200 mg, oral, Daily    diclofenac sodium (VOLTAREN) 4 g, Topical, 4 times daily    EPINEPHrine (EpiPen Jr) 0.15 mg/0.3 mL injection syringe 1 Syringe, Once    fluticasone (Flonase) 50 mcg/actuation nasal spray 1 spray, Each Nostril, Daily    furosemide (LASIX) 40 mg, oral, Daily    potassium chloride ER (Micro-K) 10 mEq ER capsule 10 mEq, oral, Daily        ROS:  A 14 point review of systems was done and is negative other than as stated in HPI    Vitals:  Vitals:    06/27/25 2136 06/27/25 2315 06/28/25 0323 06/28/25 0827   BP: 146/78 134/76 157/72 100/64   BP Location: Right leg Right leg Right leg Right arm   Patient Position: Lying Lying Lying Lying   Pulse: 94 97 85 81   Resp: 18 18 18 18   Temp: 36 °C (96.8 °F) 36.9 °C (98.4 °F) 36.7 °C (98.1 °F) 37.4 °C (99.3 °F)   TempSrc: Temporal Temporal Temporal Temporal   SpO2: 93% 92% 94% 91%   Weight:   60.7 kg (133 lb 13.1 oz)    Height:           Physical Exam:     Constitutional: Cooperative, in no acute distress, alert, appears stated age.  Skin: Skin color, texture, turgor normal. No rashes or lesions.  Head: Normocephalic. No masses, lesions, tenderness or abnormalities  Eyes: Extraocular movements are grossly intact.  Mouth and throat: Mucous membranes moist  Neck: Neck supple, no carotid bruits, no JVD  Respiratory: Lungs clear to auscultation, no wheezing or rhonchi, no use of accessory muscles  Chest wall: No scars, normal excursion with respiration  Cardiovascular: Regular rhythm without murmur  Gastrointestinal: Abdomen soft, nontender. Bowel sounds normal.  Musculoskeletal: Strength equal in upper extremities  Extremities: 1+ pitting edema  Neurologic: Sensation grossly intact, alert and oriented ×3    Intake/Output:   I/O last 2 completed shifts:  In: 1766.3 (29.1 mL/kg) [I.V.:716.3 (11.8 mL/kg); IV Piggyback:1050]  Out: - (0 mL/kg)   Weight: 60.7 kg     Outpatient Medications  Medications Ordered Prior to Encounter[2]    Scheduled  medications  Scheduled Medications[3]  Continuous medications  Continuous Medications[4]  PRN medications  PRN Medications[5]   Prescriptions Prior to Admission[6]    Recent Labs: (past 2 days)  Recent Results (from the past 48 hours)   CBC and Auto Differential    Collection Time: 06/27/25  2:08 PM   Result Value Ref Range    WBC 12.0 (H) 4.4 - 11.3 x10*3/uL    nRBC 0.0 0.0 - 0.0 /100 WBCs    RBC 5.16 4.00 - 5.20 x10*6/uL    Hemoglobin 15.1 12.0 - 16.0 g/dL    Hematocrit 45.2 36.0 - 46.0 %    MCV 88 80 - 100 fL    MCH 29.3 26.0 - 34.0 pg    MCHC 33.4 32.0 - 36.0 g/dL    RDW 14.0 11.5 - 14.5 %    Platelets 243 150 - 450 x10*3/uL    Neutrophils % 74.8 40.0 - 80.0 %    Immature Granulocytes %, Automated 0.3 0.0 - 0.9 %    Lymphocytes % 16.4 13.0 - 44.0 %    Monocytes % 7.8 2.0 - 10.0 %    Eosinophils % 0.5 0.0 - 6.0 %    Basophils % 0.2 0.0 - 2.0 %    Neutrophils Absolute 8.99 (H) 1.60 - 5.50 x10*3/uL    Immature Granulocytes Absolute, Automated 0.04 0.00 - 0.50 x10*3/uL    Lymphocytes Absolute 1.97 0.80 - 3.00 x10*3/uL    Monocytes Absolute 0.94 (H) 0.05 - 0.80 x10*3/uL    Eosinophils Absolute 0.06 0.00 - 0.40 x10*3/uL    Basophils Absolute 0.03 0.00 - 0.10 x10*3/uL   Comprehensive metabolic panel    Collection Time: 06/27/25  2:08 PM   Result Value Ref Range    Glucose 106 (H) 74 - 99 mg/dL    Sodium 139 136 - 145 mmol/L    Potassium 3.4 (L) 3.5 - 5.3 mmol/L    Chloride 104 98 - 107 mmol/L    Bicarbonate 25 21 - 32 mmol/L    Anion Gap 13 10 - 20 mmol/L    Urea Nitrogen 16 6 - 23 mg/dL    Creatinine 0.40 (L) 0.50 - 1.05 mg/dL    eGFR >90 >60 mL/min/1.73m*2    Calcium 9.2 8.6 - 10.3 mg/dL    Albumin 4.0 3.4 - 5.0 g/dL    Alkaline Phosphatase 90 33 - 136 U/L    Total Protein 7.0 6.4 - 8.2 g/dL    AST 41 (H) 9 - 39 U/L    Bilirubin, Total 0.9 0.0 - 1.2 mg/dL    ALT 24 7 - 45 U/L   Magnesium    Collection Time: 06/27/25  2:08 PM   Result Value Ref Range    Magnesium 1.95 1.60 - 2.40 mg/dL   Lactate    Collection Time:  06/27/25  2:08 PM   Result Value Ref Range    Lactate 1.4 0.4 - 2.0 mmol/L   Protime-INR    Collection Time: 06/27/25  2:08 PM   Result Value Ref Range    Protime 11.9 9.8 - 12.4 seconds    INR 1.1 0.9 - 1.1   Type And Screen    Collection Time: 06/27/25  2:08 PM   Result Value Ref Range    ABO TYPE O     Rh TYPE POS     ANTIBODY SCREEN NEG    Creatine Kinase    Collection Time: 06/27/25  2:08 PM   Result Value Ref Range    Creatine Kinase 368 (H) 0 - 215 U/L   Urinalysis with Reflex Culture and Microscopic    Collection Time: 06/27/25  5:17 PM   Result Value Ref Range    Color, Urine Yellow Light-Yellow, Yellow, Dark-Yellow    Appearance, Urine Clear Clear    Specific Gravity, Urine 1.032 1.005 - 1.035    pH, Urine 5.5 5.0, 5.5, 6.0, 6.5, 7.0, 7.5, 8.0    Protein, Urine 30 (1+) (A) NEGATIVE, 10 (TRACE), 20 (TRACE) mg/dL    Glucose, Urine Normal Normal mg/dL    Blood, Urine NEGATIVE NEGATIVE mg/dL    Ketones, Urine 100 (3+) (A) NEGATIVE mg/dL    Bilirubin, Urine NEGATIVE NEGATIVE mg/dL    Urobilinogen, Urine Normal Normal mg/dL    Nitrite, Urine NEGATIVE NEGATIVE    Leukocyte Esterase, Urine 25 Destinee/uL (A) NEGATIVE   Microscopic Only, Urine    Collection Time: 06/27/25  5:17 PM   Result Value Ref Range    WBC, Urine 6-10 (A) 1-5, NONE /HPF    RBC, Urine 6-10 (A) NONE, 1-2, 3-5 /HPF    Squamous Epithelial Cells, Urine 1-9 (SPARSE) Reference range not established. /HPF   CBC    Collection Time: 06/28/25  3:59 AM   Result Value Ref Range    WBC 10.3 4.4 - 11.3 x10*3/uL    nRBC 0.0 0.0 - 0.0 /100 WBCs    RBC 4.41 4.00 - 5.20 x10*6/uL    Hemoglobin 12.8 12.0 - 16.0 g/dL    Hematocrit 39.2 36.0 - 46.0 %    MCV 89 80 - 100 fL    MCH 29.0 26.0 - 34.0 pg    MCHC 32.7 32.0 - 36.0 g/dL    RDW 14.2 11.5 - 14.5 %    Platelets 221 150 - 450 x10*3/uL   Basic metabolic panel    Collection Time: 06/28/25  3:59 AM   Result Value Ref Range    Glucose 113 (H) 74 - 99 mg/dL    Sodium 137 136 - 145 mmol/L    Potassium 3.5 3.5 - 5.3 mmol/L     Chloride 104 98 - 107 mmol/L    Bicarbonate 26 21 - 32 mmol/L    Anion Gap 11 mmol/L    Urea Nitrogen 21 6 - 23 mg/dL    Creatinine 0.52 0.50 - 1.05 mg/dL    eGFR 89 >60 mL/min/1.73m*2    Calcium 8.5 (L) 8.6 - 10.3 mg/dL       CV Studies:  EKG:No results found for this or any previous visit (from the past 4464 hours).  Echocardiogram: No results found for this or any previous visit from the past 1825 days.    Stress Testing IMGRESULT(ZNV9790:1:1825): No results found for this or any previous visit from the past 1825 days.    Cardiac Catheterization: No results found for this or any previous visit from the past 1825 days.  No results found for this or any previous visit from the past 3650 days.     Cardiac Scoring: No results found for this or any previous visit from the past 1825 days.    AAA : No results found for this or any previous visit from the past 1825 days.    OTHER: No results found for this or any previous visit from the past 1825 days.    LAST IMAGING RESULTS  CT chest abdomen pelvis w IV contrast, CT lumbar spine retrospective reconstruction protocol, CT thoracic spine retrospective reconstruction protocol  Narrative: Interpreted By:  Sebas Keane,   STUDY:  CT CHEST ABDOMEN PELVIS W IV CONTRAST; CT LUMBAR SPINE RETROSPECTIVE  RECONSTRUCTION PROTOCOL; CT THORACIC SPINE RETROSPECTIVE  RECONSTRUCTION PROTOCOL; ;  6/27/2025 4:10 pm; 6/27/2025 4:15 pm      INDICATION:  Signs/Symptoms:fall back/pelvic/hip pain.          COMPARISON:  None.      ACCESSION NUMBER(S):  [id number]; [id number]; [id number]      ORDERING CLINICIAN:  SAMANTHA DYE      TECHNIQUE:  Serial axial CT images obtained of the chest, abdomen, and pelvis  following intravenous administration of the 75 mL of Omnipaque 350.  Images reformatted in the coronal and sagittal projection. Also,  retrospective reconstruction imaging performed of the thoracic and  lumbar spine in the axial, coronal, and sagittal projection.       All CT examinations are performed with 1 or more of the following  dose reduction techniques: Automated exposure control, adjustment of  mA and/or kv according to patient's size, or use of iterative  reconstruction techniques.      FINDINGS:  CT chest:      Mediastinum demonstrates demonstrates no lymphadenopathy. There is a  no hilar lymphadenopathy. Esophagus is unremarkable. Small sliding  hiatal hernia demonstrated.      Heart and great vessels demonstrate ascending thoracic aorta to  measure 3.5 cm upper limits of normal. Mild vascular calcification of  the thoracic aorta. Main pulmonary artery is unremarkable. Central  pulmonary arteries are unremarkable.      Lung parenchyma demonstrates chronic appearing basilar interstitial  changes. No focal infiltrate or effusion identified. Bilateral  basilar scar and component of bronchiectasis demonstrated. No  evidence for pneumothorax.      Visualized osseous structures demonstrate no evidence for displaced  rib fracture.      CT thoracic spine:      Component of S shaped scoliosis is demonstrated within the visualized  spine. Vertebral bodies within the thoracic spine demonstrate no  evidence for compression deformity. Mild multilevel anterior  osteophyte formation lower thoracic spine with loss of disc space  height demonstrated. Posterior spinous processes demonstrate no  evidence for fracture. There is no narrowing of the spinal canal  within the thoracic spine.      CT abdomen:      Liver is unremarkable      Spleen demonstrates hypodensity within the spleen with splenic cyst  suggested identified measuring 8 mm      Adrenal glands are unremarkable      Gallbladder is distended. There is noncalcified stone in the  gallbladder lumen identified measuring 2.1 cm.      Pancreas is unremarkable      Right kidney demonstrates a cyst in the inferior pole posteriorly  measuring 11 mm      Left kidney is unremarkable      Retroperitoneum demonstrates no  lymphadenopathy. Aorta is tortuous.  There is a mild vascular calcification of the abdominal aorta. No  aneurysm.      Loops of large bowel demonstrate uncomplicated sigmoid  diverticulosis. Small bowel loops are nondilated. There is a no  lymphadenopathy in the mesentery. Stomach is contracted.      CT pelvis:      Unopacified bladder is distended measuring 12.0 cm in the  craniocaudal dimension. There is no pelvic lymphadenopathy. No free  fluid. Status post hysterectomy.      Visualized osseous structures demonstrate unremarkable visualized  pelvis. Pubic rami are unremarkable. Sacrum demonstrates no evidence  for fracture. There is mild bilateral SI joint osteoarthritis. Mild  hip joint osteoarthritis demonstrated. There is no hip fracture.      CT lumbar spine:      Alignment of the lumbar spine demonstrates anterolisthesis of L3 on  L4 measuring 8 mm as seen on prior imaging. There is a severe loss  disc space height at L3/4 with endplate sclerosis demonstrated.  Vertebral body heights are maintained. No compression deformity  noted. Facet arthropathy lower lumbar spine. There is no evidence for  pars defect. Moderate narrowing thecal sac L3/4. Neural foramina  demonstrate mild bilateral narrowing L3/4.                      Impression: 1. No acute intrathoracic abnormality.      2. Emphysematous change with component of chronic appearing  interstitial changes demonstrated.      3. No acute compression fracture within the thoracic or lumbar spine.      4. No acute intra-abdominal abnormality. No visceral injury.      5. No hip fracture demonstrated. No fracture about the visualized  osseous pelvis or sacrum.      6. Bladder is distended measuring 12 cm nonspecific. Correlate with  patient's symptoms.      7. Moderate narrowing thecal sac L3/4 in relation to anterolisthesis  with findings as seen on prior imaging.          MACRO:  None      Signed by: Sebas Keane 6/27/2025 4:55 PM  Dictation workstation:    [id number]  XR hip left with pelvis when performed 2 or 3 views  Narrative: Interpreted By:  Dheeraj Gil,   STUDY:  XR HIP LEFT WITH PELVIS WHEN PERFORMED 2 OR 3 VIEWS 6/27/2025 2:58 pm      INDICATION:  Signs/Symptoms:fall, hip and pelvic pain      COMPARISON:  06/08/2025      ACCESSION NUMBER(S):  [id number]      ORDERING CLINICIAN:  SAMANTHA DYE      TECHNIQUE:  A single AP view of the pelvis as well as AP and lateral views of the  left hip were obtained.      FINDINGS:  There is no evidence of acute fracture or dislocation identified.  Mild hypertrophic degenerative changes are seen in the sacroiliac  joints bilaterally. Mild joint space narrowing and tiny marginal  osteophytes are seen in the hips bilaterally. Rounded calcifications  are seen with throughout the pelvis, most consistent with phleboliths.      Impression: 1.  No fracture or dislocation.  2. Degenerative changes, as described above.      MACRO:  None.      Signed by: Dheeraj Gil 6/27/2025 3:23 PM  Dictation workstation:   [id number]        Jason Estrada DO, CHERI, FACOI  6/28/2025  9:07 AM         [1]   Family History  Problem Relation Name Age of Onset    Lung cancer Other     [2]   No current facility-administered medications on file prior to encounter.     Current Outpatient Medications on File Prior to Encounter   Medication Sig Dispense Refill    acebutolol (Sectral) 200 mg capsule Take 1 capsule (200 mg) by mouth once daily. 90 capsule 1    diclofenac sodium (Voltaren) 1 % gel Apply 4.5 inches (4 g) topically 4 times a day. (Patient not taking: Reported on 6/16/2025) 200 g 11    EPINEPHrine (EpiPen Jr) 0.15 mg/0.3 mL injection syringe Inject 0.3 mL (0.15 mg) as directed 1 time.      fluticasone (Flonase) 50 mcg/actuation nasal spray Administer 1 spray into each nostril once daily. 16 g 2    furosemide (Lasix) 40 mg tablet Take 1 tablet (40 mg) by mouth once daily. 90 tablet 1    potassium chloride ER (Micro-K) 10 mEq ER capsule  Take 1 capsule (10 mEq) by mouth once daily. 90 capsule 1   [3] acebutolol, 200 mg, oral, Daily  cefTRIAXone, 1 g, intravenous, q24h  enoxaparin, 1 mg/kg, subcutaneous, q12h  fluticasone, 1 spray, Each Nostril, Daily  pantoprazole, 40 mg, oral, Daily before breakfast   Or  pantoprazole, 40 mg, intravenous, Daily before breakfast  potassium chloride CR, 20 mEq, oral, Daily    [4]    [5] PRN medications: guaiFENesin, melatonin, ondansetron **OR** ondansetron  [6]   Medications Prior to Admission   Medication Sig Dispense Refill Last Dose/Taking    acebutolol (Sectral) 200 mg capsule Take 1 capsule (200 mg) by mouth once daily. 90 capsule 1     diclofenac sodium (Voltaren) 1 % gel Apply 4.5 inches (4 g) topically 4 times a day. (Patient not taking: Reported on 6/16/2025) 200 g 11     EPINEPHrine (EpiPen Jr) 0.15 mg/0.3 mL injection syringe Inject 0.3 mL (0.15 mg) as directed 1 time.       fluticasone (Flonase) 50 mcg/actuation nasal spray Administer 1 spray into each nostril once daily. 16 g 2     furosemide (Lasix) 40 mg tablet Take 1 tablet (40 mg) by mouth once daily. 90 tablet 1     potassium chloride ER (Micro-K) 10 mEq ER capsule Take 1 capsule (10 mEq) by mouth once daily. 90 capsule 1

## 2025-06-28 NOTE — ED NOTES
Pt arrives to ED via EMS from Home    Code Status:  Full Code    HPI     Chief Complaint   Patient presents with    Fall     Fall at home  Wednesday at 1600. PT fell backwards and was stuck on the floor since. Pt was checked on by family today and brought to the ED. PT Aox4. No blood thinners noted in pt's chart or taken per pt       /59   Pulse 86   Temp 36.2 °C (97.1 °F)   Resp 16   Wt 59 kg (130 lb)   SpO2 98%     [place] Coma Scale Score: 15      LDA:   Peripheral IV 06/27/25 22 G Anterior;Right Forearm (Active)   Placement Date/Time: 06/27/25 1600   Hand Hygiene Completed: Yes  Size (Gauge): 22 G  Orientation: Anterior;Right  Location: Forearm  Site Prep: Chlorhexidine   Placed by: grey carney CT  Insertion attempts: 1  Patient Tolerance: Tolerated well   Number of days: 0       Peripheral IV 06/27/25 18 G Anterior;Right Forearm (Active)   Placement Date/Time: 06/27/25 2033   Size (Gauge): 18 G  Orientation: Anterior;Right  Location: Forearm  Site Prep: Chlorhexidine   Technique: Anatomical landmarks  Insertion attempts: 1  Patient Tolerance: Tolerated well   Number of days: 0        BACKGROUND  Medical History[1]  Surgical History[2]  Medications Ordered Prior to Encounter[3]     ASSESSMENT  Diagnoses as of 06/27/25 2047   Urinary tract infection with hematuria, site unspecified   Fall, initial encounter       Medications Currently Running:  Continuous Medications[4]     Medications Given:  ED Medication Administration from 06/27/2025 1356 to 06/27/2025 2047         Date/Time Order Dose Route Action Action by     06/27/2025 1430 EDT lactated Ringer's bolus 1,000 mL 1,000 mL intravenous New Bag BELGICA Montano     06/27/2025 1516 EDT ketorolac (Toradol) injection 15 mg 15 mg intravenous Given BELGICA Montano     06/27/2025 1546 EDT iohexol (OMNIPaque) 350 mg iodine/mL solution 75 mL 75 mL intravenous Given THIAGO Carney     06/27/2025 1607 EDT potassium chloride CR (Klor-Con) ER tablet 20 mEq 20 mEq oral Given  BELGICA Montano     06/27/2025 1717 EDT lactated Ringer's bolus 1,000 mL 0 mL intravenous Stopped VARSHA Melgar     06/27/2025 1939 EDT dilTIAZem (Cardizem) injection 10 mg 10 mg intravenous Given Sigibrandavarsha, J     06/27/2025 1943 EDT metoprolol tartrate (Lopressor) injection 5 mg 5 mg intravenous Given Siuzdavarsha, J     06/27/2025 1951 EDT magnesium sulfate 2 g in sterile water for injection 50 mL 2 g intravenous New Bag Siuzdak, J     06/27/2025 2034 EDT cefTRIAXone (Rocephin) 1 g in dextrose (iso) IV 50 mL 1 g intravenous New Bag Siuzdak, J     06/27/2025 2035 EDT dextrose 5%-0.45 % sodium chloride infusion 75 mL/hr intravenous New Bag Siuzdak, J     06/27/2025 2044 EDT fluticasone (Flonase) nasal spray 1 spray 1 spray Each Nostril Given Edwin, J                 RESULTS    Imaging:  CT lumbar spine retrospective reconstruction protocol   Final Result   1. No acute intrathoracic abnormality.        2. Emphysematous change with component of chronic appearing   interstitial changes demonstrated.        3. No acute compression fracture within the thoracic or lumbar spine.        4. No acute intra-abdominal abnormality. No visceral injury.        5. No hip fracture demonstrated. No fracture about the visualized   osseous pelvis or sacrum.        6. Bladder is distended measuring 12 cm nonspecific. Correlate with   patient's symptoms.        7. Moderate narrowing thecal sac L3/4 in relation to anterolisthesis   with findings as seen on prior imaging.             MACRO:   None        Signed by: Sebas Keane 6/27/2025 4:55 PM   Dictation workstation:   [id number]      CT thoracic spine retrospective reconstruction protocol   Final Result   1. No acute intrathoracic abnormality.        2. Emphysematous change with component of chronic appearing   interstitial changes demonstrated.        3. No acute compression fracture within the thoracic or lumbar spine.        4. No acute intra-abdominal abnormality. No visceral injury.         5. No hip fracture demonstrated. No fracture about the visualized   osseous pelvis or sacrum.        6. Bladder is distended measuring 12 cm nonspecific. Correlate with   patient's symptoms.        7. Moderate narrowing thecal sac L3/4 in relation to anterolisthesis   with findings as seen on prior imaging.             MACRO:   None        Signed by: Sebas Keane 6/27/2025 4:55 PM   Dictation workstation:   [id number]      CT chest abdomen pelvis w IV contrast   Final Result   1. No acute intrathoracic abnormality.        2. Emphysematous change with component of chronic appearing   interstitial changes demonstrated.        3. No acute compression fracture within the thoracic or lumbar spine.        4. No acute intra-abdominal abnormality. No visceral injury.        5. No hip fracture demonstrated. No fracture about the visualized   osseous pelvis or sacrum.        6. Bladder is distended measuring 12 cm nonspecific. Correlate with   patient's symptoms.        7. Moderate narrowing thecal sac L3/4 in relation to anterolisthesis   with findings as seen on prior imaging.             MACRO:   None        Signed by: Sebas Keane 6/27/2025 4:55 PM   Dictation workstation:   [id number]      XR hip left with pelvis when performed 2 or 3 views   Final Result   1.  No fracture or dislocation.   2. Degenerative changes, as described above.        MACRO:   None.        Signed by: Dheeraj Gil 6/27/2025 3:23 PM   Dictation workstation:   [id number]         }  Labs ::99  Abnormal Labs Reviewed   CBC WITH AUTO DIFFERENTIAL - Abnormal; Notable for the following components:       Result Value    WBC 12.0 (*)     Neutrophils Absolute 8.99 (*)     Monocytes Absolute 0.94 (*)     All other components within normal limits   COMPREHENSIVE METABOLIC PANEL - Abnormal; Notable for the following components:    Glucose 106 (*)     Potassium 3.4 (*)     Creatinine 0.40 (*)     AST 41 (*)     All other components within normal  limits   CREATINE KINASE - Abnormal; Notable for the following components:    Creatine Kinase 368 (*)     All other components within normal limits   URINALYSIS WITH REFLEX CULTURE AND MICROSCOPIC - Abnormal; Notable for the following components:    Protein, Urine 30 (1+) (*)     Ketones, Urine 100 (3+) (*)     Leukocyte Esterase, Urine 25 [place]/uL (*)     All other components within normal limits   MICROSCOPIC ONLY, URINE - Abnormal; Notable for the following components:    WBC, Urine 6-10 (*)     RBC, Urine 6-10 (*)     All other components within normal limits                   [1]   Past Medical History:  Diagnosis Date    Diverticulosis of intestine, part unspecified, without perforation or abscess without bleeding     Diverticulosis    Dysphagia 07/05/2023    Esophageal reflux 07/05/2023    Food impaction of esophagus 07/05/2023    Fracture of radius 04/19/2022    Hyperpigmented skin lesion 07/05/2023    Osteoporosis     Personal history of other diseases of the musculoskeletal system and connective tissue     History of osteoarthritis    Personal history of other endocrine, nutritional and metabolic disease     History of obesity    Personal history of other specified conditions     History of insomnia    Radius fracture 03/13/2024    Rheumatic mitral valve disease, unspecified     Mitral valve problem    Ulna fracture 03/13/2024   [2]   Past Surgical History:  Procedure Laterality Date    BREAST LUMPECTOMY      CATARACT EXTRACTION      COLONOSCOPY      HYSTERECTOMY      OTHER SURGICAL HISTORY  06/04/2019    Hysterectomy    OTHER SURGICAL HISTORY  06/04/2019    Knee surgery    TONSILLECTOMY      UPPER GASTROINTESTINAL ENDOSCOPY     [3]   No current facility-administered medications on file prior to encounter.     Current Outpatient Medications on File Prior to Encounter   Medication Sig Dispense Refill    acebutolol (Sectral) 200 mg capsule Take 1 capsule (200 mg) by mouth once daily. 90 capsule 1    diclofenac  sodium (Voltaren) 1 % gel Apply 4.5 inches (4 g) topically 4 times a day. (Patient not taking: Reported on 6/16/2025) 200 g 11    EPINEPHrine (EpiPen Jr) 0.15 mg/0.3 mL injection syringe Inject 0.3 mL (0.15 mg) as directed 1 time.      fluticasone (Flonase) 50 mcg/actuation nasal spray Administer 1 spray into each nostril once daily. 16 g 2    furosemide (Lasix) 40 mg tablet Take 1 tablet (40 mg) by mouth once daily. 90 tablet 1    potassium chloride ER (Micro-K) 10 mEq ER capsule Take 1 capsule (10 mEq) by mouth once daily. 90 capsule 1   [4] dextrose 5%-0.45 % sodium chloride, 75 mL/hr, Last Rate: 75 mL/hr (06/27/25 2035)       Marcel Pineda RN  06/27/25 2047

## 2025-06-28 NOTE — PROGRESS NOTES
Physical Therapy    Physical Therapy Evaluation    Patient Name: Nadja Rodriguez  MRN: [id number]  Department: [facility] NON[facility]  Room: 2015/2015-A  Today's Date: 6/28/2025   Time Calculation  Start Time: 0952  Stop Time: 1016  Time Calculation (min): 24 min    Assessment/Plan   PT Assessment  PT Assessment Results: Decreased strength, Decreased range of motion, Decreased endurance, Impaired balance, Decreased mobility, Pain  Rehab Prognosis: Fair  Barriers to Discharge Home: Caregiver assistance, Physical needs  Evaluation/Treatment Tolerance: Patient limited by pain, Patient limited by fatigue  End of Session Communication: Bedside nurse  Assessment Comment: Pt would benefit from continued therapy to improve strength, ROM, and functional mobility.  End of Session Patient Position: Bed, 3 rail up, Alarm on (ECHO in room after session)  IP OR SWING BED PT PLAN  Inpatient or Swing Bed: Inpatient  PT Plan  Treatment/Interventions: Bed mobility, Transfer training, Gait training, Strengthening, Balance training, Therapeutic exercise, Home exercise program  PT Plan: Ongoing PT  PT Frequency: 4 times per week  PT Discharge Recommendations: Moderate intensity level of continued care  Equipment Recommended upon Discharge:  (Hemiwalker?)  PT Recommended Transfer Status: Assist x2, Assistive device  PT - OK to Discharge: Once medically appropriate    Subjective     PT Visit Info:  PT Received On: 06/28/25  General Visit Information:  General  Reason for Referral: impaired mobility  Referred By: PT/OT Enrique 6/27  Past Medical History Relevant to Rehab: HTN, hypokalemia, osteoporosis, palpitations (presumed afib), Parkinsons presenting with weakness and fall.  Family/Caregiver Present: No  Co-Treatment: OT  Co-Treatment Reason: Maximize pt/staff safety  Prior to Session Communication: Bedside nurse  Patient Position Received: Bed, 3 rail up, Alarm on  General Comment: Pt to ED 6/27 after weakness causing fall. Pt hit head on floor and  could not get self up. Pt laid on floor for ~2 days before son checked on her. 6/27: CT chest/abdomen/pelvis (-), CT lumbar/thoracic spine (-), XR L hip (-)  Home Living:  Home Living  Type of Home: Apartment  Lives With: Alone  Home Adaptive Equipment: Walker rolling or standard, Cane, Wheelchair-manual, Quad cane  Home Layout: One level  Home Access: Level entry  Bathroom Shower/Tub: Walk-in shower  Bathroom Toilet: Standard  Bathroom Equipment: Grab bars in shower, Built-in shower seat  Home Living Comments: family and neighbors assist  Prior Level of Function:  Prior Function Per Pt/Caregiver Report  Level of [place]: Independent with ADLs and functional transfers, Independent with homemaking with ambulation  Receives Help From: Family, Friends, Neighbor  ADL Assistance: Independent  Ambulatory Assistance: Independent (No AD)  Prior Function Comments: increased falls, pt has life alert but daughter has had it over last week or so due to not working, pt daughter and son assist with obtaining groceries and driving pt, typically independent in ADLs  Precautions:  Precautions  Medical Precautions: Fall precautions  Precautions Comment: non functional LUE/ contracted    Objective   Pain:  Pain Assessment  Pain Assessment: 0-10  0-10 (Numeric) Pain Score: 0 - No pain  Cognition:  Cognition  Overall Cognitive Status: Within Functional Limits  Task Initiation: Initiates with cues  Processing Speed: Delayed    General Assessments:  General Observation  General Observation: Pt supine in bed prior to session with HOB elevated to 40 deg. Pt reports being unable to move L UE secondary to severe RA and contractures. Pt educated on PT POC and consents to PT evaluation     Activity Tolerance  Endurance: Decreased tolerance for upright activites    Sensation  Sensation Comment: no N/T    Static Sitting Balance  Static Sitting-Comment/Number of Minutes: CGA + B UE/LE support  Dynamic Sitting Balance  Dynamic  Sitting-Comments: CGA + B UE/LE support during strength assessment    Static Standing Balance  Static Standing-Comment/Number of Minutes: Mod A x2 + hemiwalker  Dynamic Standing Balance  Dynamic Standing-Comments: Mod A x2 + hemiwalker During ambulation  Functional Assessments:  Bed Mobility  Bed Mobility: Yes  Bed Mobility 1  Bed Mobility 1: Supine to sitting, Sitting to supine  Level of Assistance 1: Moderate assistance, +2  Bed Mobility Comments 1: HOb elevated to 40 deg, Assist needed to bring  B LE off bed and for trunk advancement  Bed Mobility 2  Bed Mobility Comments 2: Dependent x 2 supine scoot toward HOB with bed height elevated, Blue pad used    Transfers  Transfer: Yes  Transfer 1  Technique 1: Sit to stand, Stand to sit  Transfer Device 1: Cuong-walker, Gait belt  Transfer Level of Assistance 1: Moderate assistance, +2, Minimal verbal cues  Trials/Comments 1: to/from EOB, cues for hand placement    Ambulation/Gait Training  Ambulation/Gait Training Performed:  (Pt took 4 side steps to L, Mod A x2, gait belt, cuong walker. Short shuffeling steps, decreased coordination with AD)  Extremity/Trunk Assessments:  ROM  RUE : Within Functional Limits  RLE : Within Functional Limits  LLE : Within Functional Limits    Strength  Strength Comments: B hip flexion strength: 4-/5, B knee extension strength: 5/5, B DF strength: 5/5    Outcome Measures:  [facility] Basic Mobility  Turning from your back to your side while in a flat bed without using bedrails: A lot  Moving from lying on your back to sitting on the side of a flat bed without using bedrails: A lot  Moving to and from bed to chair (including a wheelchair): A lot  Standing up from a chair using your arms (e.g. wheelchair or bedside chair): A lot  To walk in hospital room: A lot  Climbing 3-5 steps with railing: Total  Basic Mobility - Total Score: 11    Encounter Problems       Encounter Problems (Active)       PT Problem       Pt will complete supine <> sit bed  mobility from flat bed with Min A  (Progressing)       Start:  06/28/25    Expected End:  07/12/25            Pt will demonstrate sit to stand and bed/chair transfers with Cuong walker/QC + Min A  (Progressing)       Start:  06/28/25    Expected End:  07/12/25            Pt. will ambulate 10ft with Hemiwalker/QC + Min A  (Progressing)       Start:  06/28/25    Expected End:  07/12/25            Pt will maintain balance while reaching outside SHEBA in sitting with B LE support and CGA  (Not Progressing)       Start:  06/28/25    Expected End:  07/12/25            Pt will complete B LE strengthening HEP with independence  (Not Progressing)       Start:  06/28/25    Expected End:  07/12/25                   Education Documentation  Body Mechanics, taught by Erica Bell, PT at 6/28/2025  2:11 PM.  Learner: Patient  Readiness: Acceptance  Method: Explanation  Response: Verbalizes Understanding, Demonstrated Understanding    Mobility Training, taught by Erica Bell, PT at 6/28/2025  2:11 PM.  Learner: Patient  Readiness: Acceptance  Method: Explanation  Response: Verbalizes Understanding, Demonstrated Understanding

## 2025-06-28 NOTE — CARE PLAN
The patient's goals for the shift include      The clinical goals for the shift include patient will remain NSR throughout shift      Problem: Skin  Goal: Decreased wound size/increased tissue granulation at next dressing change  Outcome: Progressing  Flowsheets (Taken 6/28/2025 0325)  Decreased wound size/increased tissue granulation at next dressing change: Protective dressings over bony prominences  Goal: Participates in plan/prevention/treatment measures  Outcome: Progressing  Flowsheets (Taken 6/28/2025 0325)  Participates in plan/prevention/treatment measures: Discuss with provider PT/OT consult  Goal: Prevent/manage excess moisture  Outcome: Progressing  Flowsheets (Taken 6/28/2025 0325)  Prevent/manage excess moisture:   Monitor for/manage infection if present   Cleanse incontinence/protect with barrier cream  Goal: Prevent/minimize sheer/friction injuries  Outcome: Progressing  Flowsheets (Taken 6/28/2025 0325)  Prevent/minimize sheer/friction injuries: Turn/reposition every 2 hours/use positioning/transfer devices  Goal: Promote/optimize nutrition  Outcome: Progressing  Flowsheets (Taken 6/28/2025 0325)  Promote/optimize nutrition: Monitor/record intake including meals  Goal: Promote skin healing  Outcome: Progressing  Flowsheets (Taken 6/28/2025 0325)  Promote skin healing:   Turn/reposition every 2 hours/use positioning/transfer devices   Protective dressings over bony prominences     Problem: Pain - Adult  Goal: Verbalizes/displays adequate comfort level or baseline comfort level  Outcome: Progressing     Problem: Safety - Adult  Goal: Free from fall injury  Outcome: Progressing     Problem: Discharge Planning  Goal: Discharge to home or other facility with appropriate resources  Outcome: Progressing     Problem: Chronic Conditions and Co-morbidities  Goal: Patient's chronic conditions and co-morbidity symptoms are monitored and maintained or improved  Outcome: Progressing     Problem: Nutrition  Goal:  Nutrient intake appropriate for maintaining nutritional needs  Outcome: Progressing

## 2025-06-28 NOTE — NURSING NOTE
End of Shift Report  6/28/25     Admission  Nadja Rodriguez was admitted on 6/27/2025 for the following diagnoses:   Fall, initial encounter [W19.XXXA]  Urinary tract infection with hematuria, site unspecified [N39.0, R31.9]    Significant Events      Interventions      Response to Interventions       Recent Vital Signs  Patient Vitals for the past 12 hrs:   BP Temp Temp src Pulse Resp SpO2 Weight   06/27/25 1831 128/64 -- -- 96 16 96 % --   06/27/25 1928 -- -- -- (!) 179 -- -- --   06/27/25 1933 -- -- -- (!) 176 -- -- --   06/27/25 1936 -- -- -- (!) 172 -- -- --   06/27/25 1939 126/73 -- -- (!) 174 -- -- --   06/27/25 1942 (!) 113/92 -- -- (!) 172 -- -- --   06/27/25 1945 98/60 -- -- (!) 105 -- -- --   06/27/25 1956 100/59 -- -- (!) 105 16 98 % --   06/27/25 2000 92/58 -- -- (!) 112 -- -- --   06/27/25 2015 100/56 -- -- (!) 112 16 98 % --   06/27/25 2043 142/59 -- -- 86 16 98 % --   06/27/25 2136 146/78 36 °C (96.8 °F) Temporal 94 18 93 % --   06/27/25 2315 134/76 36.9 °C (98.4 °F) Temporal 97 18 92 % --   06/28/25 0323 157/72 36.7 °C (98.1 °F) Temporal 85 18 94 % 60.7 kg (133 lb 13.1 oz)      0-10 (Numeric) Pain Score: 0 - No pain     Latest labs  Lab Results   Component Value Date    WBC 10.3 06/28/2025    HGB 12.8 06/28/2025    HCT 39.2 06/28/2025     06/28/2025    CHOL 172 07/10/2023    TRIG 184 (H) 07/10/2023    HDL 30.0 (A) 07/10/2023    ALT 24 06/27/2025    AST 41 (H) 06/27/2025     06/28/2025    K 3.5 06/28/2025     06/28/2025    CREATININE 0.52 06/28/2025    BUN 21 06/28/2025    CO2 26 06/28/2025    TSH 2.84 01/30/2024    INR 1.1 06/27/2025       Other Results      Scheduled Medications:  Scheduled Medications[1]   Continuous Medications[2]          [1] acebutolol, 200 mg, oral, Daily  cefTRIAXone, 1 g, intravenous, q24h  enoxaparin, 1 mg/kg, subcutaneous, q12h  fluticasone, 1 spray, Each Nostril, Daily  pantoprazole, 40 mg, oral, Daily before breakfast   Or  pantoprazole, 40 mg,  intravenous, Daily before breakfast  potassium chloride CR, 20 mEq, oral, Daily  potassium chloride ER, 10 mEq, oral, Daily  [2] dextrose 5%-0.45 % sodium chloride, 75 mL/hr, Last Rate: 75 mL/hr (06/28/25 0530)

## 2025-06-29 VITALS
DIASTOLIC BLOOD PRESSURE: 77 MMHG | HEIGHT: 60 IN | SYSTOLIC BLOOD PRESSURE: 125 MMHG | TEMPERATURE: 97.2 F | BODY MASS INDEX: 28.09 KG/M2 | RESPIRATION RATE: 12 BRPM | HEART RATE: 69 BPM | OXYGEN SATURATION: 93 % | WEIGHT: 143.08 LBS

## 2025-06-29 LAB
ANION GAP SERPL CALC-SCNC: 10 MMOL/L (ref 10–20)
BACTERIA UR CULT: NORMAL
BUN SERPL-MCNC: 15 MG/DL (ref 6–23)
CALCIUM SERPL-MCNC: 8.5 MG/DL (ref 8.6–10.3)
CHLORIDE SERPL-SCNC: 106 MMOL/L (ref 98–107)
CO2 SERPL-SCNC: 25 MMOL/L (ref 21–32)
CREAT SERPL-MCNC: 0.36 MG/DL (ref 0.5–1.05)
EGFRCR SERPLBLD CKD-EPI 2021: >90 ML/MIN/1.73M*2
ERYTHROCYTE [DISTWIDTH] IN BLOOD BY AUTOMATED COUNT: 14.5 % (ref 11.5–14.5)
GLUCOSE SERPL-MCNC: 106 MG/DL (ref 74–99)
HCT VFR BLD AUTO: 40.1 % (ref 36–46)
HGB BLD-MCNC: 13.5 G/DL (ref 12–16)
MAGNESIUM SERPL-MCNC: 2.04 MG/DL (ref 1.6–2.4)
MCH RBC QN AUTO: 29.4 PG (ref 26–34)
MCHC RBC AUTO-ENTMCNC: 33.7 G/DL (ref 32–36)
MCV RBC AUTO: 87 FL (ref 80–100)
NRBC BLD-RTO: 0 /100 WBCS (ref 0–0)
PLATELET # BLD AUTO: 233 X10*3/UL (ref 150–450)
POTASSIUM SERPL-SCNC: 4.2 MMOL/L (ref 3.5–5.3)
RBC # BLD AUTO: 4.59 X10*6/UL (ref 4–5.2)
SODIUM SERPL-SCNC: 137 MMOL/L (ref 136–145)
WBC # BLD AUTO: 8.7 X10*3/UL (ref 4.4–11.3)

## 2025-06-29 PROCEDURE — 36415 COLL VENOUS BLD VENIPUNCTURE: CPT | Performed by: INTERNAL MEDICINE

## 2025-06-29 PROCEDURE — 80048 BASIC METABOLIC PNL TOTAL CA: CPT | Performed by: INTERNAL MEDICINE

## 2025-06-29 PROCEDURE — 99233 SBSQ HOSP IP/OBS HIGH 50: CPT | Performed by: INTERNAL MEDICINE

## 2025-06-29 PROCEDURE — 85027 COMPLETE CBC AUTOMATED: CPT | Performed by: INTERNAL MEDICINE

## 2025-06-29 PROCEDURE — 2500000001 HC RX 250 WO HCPCS SELF ADMINISTERED DRUGS (ALT 637 FOR MEDICARE OP): Performed by: STUDENT IN AN ORGANIZED HEALTH CARE EDUCATION/TRAINING PROGRAM

## 2025-06-29 PROCEDURE — 83735 ASSAY OF MAGNESIUM: CPT | Performed by: INTERNAL MEDICINE

## 2025-06-29 PROCEDURE — 2500000001 HC RX 250 WO HCPCS SELF ADMINISTERED DRUGS (ALT 637 FOR MEDICARE OP): Performed by: INTERNAL MEDICINE

## 2025-06-29 PROCEDURE — 2500000004 HC RX 250 GENERAL PHARMACY W/ HCPCS (ALT 636 FOR OP/ED): Performed by: STUDENT IN AN ORGANIZED HEALTH CARE EDUCATION/TRAINING PROGRAM

## 2025-06-29 PROCEDURE — 2500000002 HC RX 250 W HCPCS SELF ADMINISTERED DRUGS (ALT 637 FOR MEDICARE OP, ALT 636 FOR OP/ED): Performed by: STUDENT IN AN ORGANIZED HEALTH CARE EDUCATION/TRAINING PROGRAM

## 2025-06-29 PROCEDURE — 2060000001 HC INTERMEDIATE ICU ROOM DAILY

## 2025-06-29 PROCEDURE — 2500000001 HC RX 250 WO HCPCS SELF ADMINISTERED DRUGS (ALT 637 FOR MEDICARE OP)

## 2025-06-29 PROCEDURE — 99232 SBSQ HOSP IP/OBS MODERATE 35: CPT | Performed by: INTERNAL MEDICINE

## 2025-06-29 RX ORDER — MELOXICAM 7.5 MG/1
7.5 TABLET ORAL DAILY
Status: ON HOLD | COMMUNITY

## 2025-06-29 RX ORDER — ACETAMINOPHEN 500 MG
2000 TABLET ORAL 2 TIMES DAILY
Status: ON HOLD | COMMUNITY

## 2025-06-29 RX ORDER — BISMUTH SUBSALICYLATE 262 MG
1 TABLET,CHEWABLE ORAL DAILY
Status: ON HOLD | COMMUNITY

## 2025-06-29 RX ORDER — ASCORBIC ACID 500 MG
500 TABLET ORAL DAILY
Status: ON HOLD | COMMUNITY

## 2025-06-29 RX ORDER — TRAMADOL HYDROCHLORIDE 50 MG/1
50 TABLET, FILM COATED ORAL ONCE AS NEEDED
Status: COMPLETED | OUTPATIENT
Start: 2025-06-29 | End: 2025-06-29

## 2025-06-29 RX ORDER — TRAMADOL HYDROCHLORIDE 50 MG/1
50 TABLET, FILM COATED ORAL EVERY 6 HOURS PRN
Status: DISCONTINUED | OUTPATIENT
Start: 2025-06-29 | End: 2025-06-29

## 2025-06-29 RX ADMIN — POTASSIUM CHLORIDE 20 MEQ: 1500 TABLET, EXTENDED RELEASE ORAL at 07:37

## 2025-06-29 RX ADMIN — PANTOPRAZOLE SODIUM 40 MG: 40 TABLET, DELAYED RELEASE ORAL at 05:27

## 2025-06-29 RX ADMIN — APIXABAN 5 MG: 5 TABLET, FILM COATED ORAL at 20:38

## 2025-06-29 RX ADMIN — ACEBUTOLOL HYDROCHLORIDE 200 MG: 200 CAPSULE ORAL at 07:37

## 2025-06-29 RX ADMIN — APIXABAN 5 MG: 5 TABLET, FILM COATED ORAL at 07:37

## 2025-06-29 RX ADMIN — FLUTICASONE PROPIONATE 1 SPRAY: 50 SPRAY, METERED NASAL at 07:36

## 2025-06-29 RX ADMIN — TRAMADOL HYDROCHLORIDE 50 MG: 50 TABLET, COATED ORAL at 21:13

## 2025-06-29 RX ADMIN — CEFTRIAXONE 1 G: 1 INJECTION, SOLUTION INTRAVENOUS at 20:40

## 2025-06-29 ASSESSMENT — COGNITIVE AND FUNCTIONAL STATUS - GENERAL
DRESSING REGULAR UPPER BODY CLOTHING: A LOT
HELP NEEDED FOR BATHING: A LOT
STANDING UP FROM CHAIR USING ARMS: A LOT
MOVING FROM LYING ON BACK TO SITTING ON SIDE OF FLAT BED WITH BEDRAILS: A LITTLE
TURNING FROM BACK TO SIDE WHILE IN FLAT BAD: A LOT
WALKING IN HOSPITAL ROOM: A LOT
MOVING TO AND FROM BED TO CHAIR: A LOT
MOBILITY SCORE: 13
DAILY ACTIVITIY SCORE: 15
TOILETING: A LOT
CLIMB 3 TO 5 STEPS WITH RAILING: A LOT
PERSONAL GROOMING: A LITTLE
DRESSING REGULAR LOWER BODY CLOTHING: A LOT

## 2025-06-29 ASSESSMENT — PAIN - FUNCTIONAL ASSESSMENT: PAIN_FUNCTIONAL_ASSESSMENT: 0-10

## 2025-06-29 ASSESSMENT — PAIN SCALES - GENERAL: PAINLEVEL_OUTOF10: 10 - WORST POSSIBLE PAIN

## 2025-06-29 NOTE — PROGRESS NOTES
Nadja Rodriguez is a 89 y.o. female admitted for Urinary tract infection with hematuria, site unspecified. Pharmacy reviewed the patient's [id number] medications and allergies for accuracy.    The list below reflects the PTA list prior to pharmacy medication history. A summary a changes to the PTA medication list has been listed below. Please review each medication in order reconciliation for additional clarification and justification.    Source of information:  [facility]  Medications added:  MELOXICAM 7.5MG 1 every day  ACETAMINOPHEN 500MG 2000 MG BID  MULTIVITAMIN 1 every day  CALCIUM 500MG 1 TWICE A WEEK TUESDAYS,THURSDAYS  VITAMIN C 500MG 1 QD    Medications modified:    Medications to be removed:    Medications of concern:      Prior to Admission Medications   Prescriptions Last Dose Informant Patient Reported? Taking?   EPINEPHrine (EpiPen Jr) 0.15 mg/0.3 mL injection syringe   Yes No   Sig: Inject 0.3 mL (0.15 mg) as directed 1 time.   acebutolol (Sectral) 200 mg capsule   No No   Sig: Take 1 capsule (200 mg) by mouth once daily.   diclofenac sodium (Voltaren) 1 % gel   No No   Sig: Apply 4.5 inches (4 g) topically 4 times a day.   Patient not taking: Reported on 6/16/2025   fluticasone (Flonase) 50 mcg/actuation nasal spray   No No   Sig: Administer 1 spray into each nostril once daily.   furosemide (Lasix) 40 mg tablet   No No   Sig: Take 1 tablet (40 mg) by mouth once daily.   potassium chloride ER (Micro-K) 10 mEq ER capsule   No No   Sig: Take 1 capsule (10 mEq) by mouth once daily.      Facility-Administered Medications: None       Shante Gallo

## 2025-06-29 NOTE — PROGRESS NOTES
[facility]    Patient Name: Nadja Rodriguez  Patient : 1935  Room/Bed: -A    Date: 25  Time: 9:38 AM    Referred by Dr. Ku ref. provider found for Fall (Fall at home  Wednesday at 1600. PT fell backwards and was stuck on the floor since. Pt was checked on by family today and brought to the ED. PT Aox4. No blood thinners noted in pt's chart or taken per pt)     History Of Present Illness:    Nadja Rodriguez is a 89 y.o. female admitted after a fall at home where she was unable to get up for approximately 2 days.  Patient was subsequently brought to the emergency department for evaluation.  Patient had a rapid response where she was noted to have atrial fibrillation with RVR and a heart rate of 170 bpm.  Cardiology was consulted for additional recommendations regarding her atrial fibrillation.  Telemetry currently showing sinus rhythm with heart rate in the 70s.  BMP showed a serum sodium 137, serum potassium 3.5, serum creatinine is 0.52.  Serum magnesium was 1.95.  CK was 368.  CBC showed hemoglobin 12.8. Hip x-ray showed no fracture or dislocation with degenerative changes.  CT scan of the chest/abdomen/pelvis showed no acute intrathoracic abnormality, emphysematous change with component of chronic appearing interstitial changes demonstrated, no acute compression fracture within the thoracic or lumbar spine, no acute intra-abdominal abnormality, no hip fracture demonstrated, bladder is distended, moderate narrowing thecal sac L3/4.  During my exam patient was resting comfortably in bed.    : Patient states she feels well this morning without any new complaints.  Telemetry showing sinus rhythm with heart rate in the 60s.  Lab work showing normal serum sodium and potassium with a serum creatinine 0.36.  Echocardiogram done yesterday showing normal left ventricular systolic function with an ejection fraction of 60 to 65%, normal right ventricular systolic  function, and mild aortic valve regurgitation.  During my exam patient was resting comfortably.    Assessment/Plan:   1.  Paroxysmal atrial fibrillation  Patient reported to have an episode of paroxysmal atrial fibrillation which spontaneously resolved.  Telemetry currently showing sinus rhythm with heart rate in the 70s.  She does have an elevated [id number] score and has been placed on enoxaparin.  This can be changed to DOAC therapy such as apixaban 5 mg twice daily prior to discharge.  Check echocardiogram.  Patient is on acebutolol for heart rate control which can be continued.  Continue to monitor on telemetry while here.    6/29: Continue apixaban and acebutolol therapy. Echocardiogram done yesterday showing normal left ventricular systolic function with an ejection fraction of 60 to 65%, normal right ventricular systolic function, and mild aortic valve regurgitation.  Telemetry showing sinus rhythm and heart rate in the 60s.  Patient will need to follow-up outpatient.    2.  Hypertension  The patient has a history of hypertension which appears controlled this morning.  Continue to monitor and adjust antihypertensive medical therapy as necessary.    6/29: Stable, continue to monitor and adjust antihypertensive medical therapy as necessary.    3.  Fall/weakness  Patient had a fall at home and was unable to get up for approximately 48 hours.  Imaging did not show any fractures.  Management per hospitalist service.    4.  History of parkinsonism  Management per hospitalist service    5.  Aortic valve regurgitation  Echocardiogram done yesterday showing normal left ventricular systolic function with an ejection fraction of 60 to 65%, normal right ventricular systolic function, and mild aortic valve regurgitation.  Continue to monitor clinically for now.    Past Medical History:  She has a past medical history of Diverticulosis of intestine, part unspecified, without perforation or abscess without bleeding,  Dysphagia (07/05/2023), Esophageal reflux (07/05/2023), Food impaction of esophagus (07/05/2023), Fracture of radius (04/19/2022), Hyperpigmented skin lesion (07/05/2023), Osteoporosis, Personal history of other diseases of the musculoskeletal system and connective tissue, Personal history of other endocrine, nutritional and metabolic disease, Personal history of other specified conditions, Radius fracture (03/13/2024), Rheumatic mitral valve disease, unspecified, and Ulna fracture (03/13/2024).    Past Surgical History:  She has a past surgical history that includes Other surgical history (06/04/2019); Other surgical history (06/04/2019); Hysterectomy; Breast lumpectomy; Cataract extraction; Colonoscopy; Upper gastrointestinal endoscopy; and Tonsillectomy.      Social History:  She reports that she has never smoked. She has never been exposed to tobacco smoke. She has never used smokeless tobacco. She reports that she does not drink alcohol and does not use drugs.    Family History:  Family History[1]     Allergies:  Aspirin, Bee venom protein (honey bee), Flu vac 2020 [id number](pf), and Wheat    Outpatient Medications:  Current Outpatient Medications   Medication Instructions    acebutolol (SECTRAL) 200 mg, oral, Daily    diclofenac sodium (VOLTAREN) 4 g, Topical, 4 times daily    EPINEPHrine (EpiPen Jr) 0.15 mg/0.3 mL injection syringe 1 Syringe, Once    fluticasone (Flonase) 50 mcg/actuation nasal spray 1 spray, Each Nostril, Daily    furosemide (LASIX) 40 mg, oral, Daily    potassium chloride ER (Micro-K) 10 mEq ER capsule 10 mEq, oral, Daily        ROS:  A 14 point review of systems was done and is negative other than as stated in HPI    Vitals:  Vitals:    06/28/25 1914 06/28/25 2349 06/29/25 0307 06/29/25 0836   BP: 116/74 119/73 127/80 132/80   BP Location: Right arm Right arm Right arm Right arm   Patient Position: Lying Lying Lying Lying   Pulse: 62 105 77 73   Resp: 16 18 18 16   Temp: 36.6 °C (97.8  °F) 36.6 °C (97.9 °F) 36.7 °C (98 °F) 36.5 °C (97.7 °F)   TempSrc: Temporal Temporal Temporal Temporal   SpO2: 94% 93% 94% 93%   Weight:   64.9 kg (143 lb 1.3 oz)    Height:           Physical Exam:     Constitutional: Cooperative, in no acute distress, alert, appears stated age.  Skin: Skin color, texture, turgor normal. No rashes or lesions.  Head: Normocephalic. No masses, lesions, tenderness or abnormalities  Eyes: Extraocular movements are grossly intact.  Mouth and throat: Mucous membranes moist  Neck: Neck supple, no carotid bruits, no JVD  Respiratory: Lungs clear to auscultation, no wheezing or rhonchi, no use of accessory muscles  Chest wall: No scars, normal excursion with respiration  Cardiovascular: Regular rhythm without murmur  Gastrointestinal: Abdomen soft, nontender. Bowel sounds normal.  Musculoskeletal: Strength equal in upper extremities  Extremities: 1+ pitting edema  Neurologic: Sensation grossly intact, alert and oriented ×3    Intake/Output:   I/O last 2 completed shifts:  In: 480 (7.4 mL/kg) [P.O.:480]  Out: 600 (9.2 mL/kg) [Urine:600 (0.4 mL/kg/hr)]  Weight: 64.9 kg     Outpatient Medications  Medications Ordered Prior to Encounter[2]    Scheduled medications  Scheduled Medications[3]  Continuous medications  Continuous Medications[4]  PRN medications  PRN Medications[5]   Prescriptions Prior to Admission[6]    Recent Labs: (past 2 days)  Recent Results (from the past 48 hours)   CBC and Auto Differential    Collection Time: 06/27/25  2:08 PM   Result Value Ref Range    WBC 12.0 (H) 4.4 - 11.3 x10*3/uL    nRBC 0.0 0.0 - 0.0 /100 WBCs    RBC 5.16 4.00 - 5.20 x10*6/uL    Hemoglobin 15.1 12.0 - 16.0 g/dL    Hematocrit 45.2 36.0 - 46.0 %    MCV 88 80 - 100 fL    MCH 29.3 26.0 - 34.0 pg    MCHC 33.4 32.0 - 36.0 g/dL    RDW 14.0 11.5 - 14.5 %    Platelets 243 150 - 450 x10*3/uL    Neutrophils % 74.8 40.0 - 80.0 %    Immature Granulocytes %, Automated 0.3 0.0 - 0.9 %    Lymphocytes % 16.4 13.0 -  44.0 %    Monocytes % 7.8 2.0 - 10.0 %    Eosinophils % 0.5 0.0 - 6.0 %    Basophils % 0.2 0.0 - 2.0 %    Neutrophils Absolute 8.99 (H) 1.60 - 5.50 x10*3/uL    Immature Granulocytes Absolute, Automated 0.04 0.00 - 0.50 x10*3/uL    Lymphocytes Absolute 1.97 0.80 - 3.00 x10*3/uL    Monocytes Absolute 0.94 (H) 0.05 - 0.80 x10*3/uL    Eosinophils Absolute 0.06 0.00 - 0.40 x10*3/uL    Basophils Absolute 0.03 0.00 - 0.10 x10*3/uL   Comprehensive metabolic panel    Collection Time: 06/27/25  2:08 PM   Result Value Ref Range    Glucose 106 (H) 74 - 99 mg/dL    Sodium 139 136 - 145 mmol/L    Potassium 3.4 (L) 3.5 - 5.3 mmol/L    Chloride 104 98 - 107 mmol/L    Bicarbonate 25 21 - 32 mmol/L    Anion Gap 13 10 - 20 mmol/L    Urea Nitrogen 16 6 - 23 mg/dL    Creatinine 0.40 (L) 0.50 - 1.05 mg/dL    eGFR >90 >60 mL/min/1.73m*2    Calcium 9.2 8.6 - 10.3 mg/dL    Albumin 4.0 3.4 - 5.0 g/dL    Alkaline Phosphatase 90 33 - 136 U/L    Total Protein 7.0 6.4 - 8.2 g/dL    AST 41 (H) 9 - 39 U/L    Bilirubin, Total 0.9 0.0 - 1.2 mg/dL    ALT 24 7 - 45 U/L   Magnesium    Collection Time: 06/27/25  2:08 PM   Result Value Ref Range    Magnesium 1.95 1.60 - 2.40 mg/dL   Lactate    Collection Time: 06/27/25  2:08 PM   Result Value Ref Range    Lactate 1.4 0.4 - 2.0 mmol/L   Protime-INR    Collection Time: 06/27/25  2:08 PM   Result Value Ref Range    Protime 11.9 9.8 - 12.4 seconds    INR 1.1 0.9 - 1.1   Type And Screen    Collection Time: 06/27/25  2:08 PM   Result Value Ref Range    ABO TYPE O     Rh TYPE POS     ANTIBODY SCREEN NEG    Creatine Kinase    Collection Time: 06/27/25  2:08 PM   Result Value Ref Range    Creatine Kinase 368 (H) 0 - 215 U/L   Urinalysis with Reflex Culture and Microscopic    Collection Time: 06/27/25  5:17 PM   Result Value Ref Range    Color, Urine Yellow Light-Yellow, Yellow, Dark-Yellow    Appearance, Urine Clear Clear    Specific Gravity, Urine 1.032 1.005 - 1.035    pH, Urine 5.5 5.0, 5.5, 6.0, 6.5, 7.0,  7.5, 8.0    Protein, Urine 30 (1+) (A) NEGATIVE, 10 (TRACE), 20 (TRACE) mg/dL    Glucose, Urine Normal Normal mg/dL    Blood, Urine NEGATIVE NEGATIVE mg/dL    Ketones, Urine 100 (3+) (A) NEGATIVE mg/dL    Bilirubin, Urine NEGATIVE NEGATIVE mg/dL    Urobilinogen, Urine Normal Normal mg/dL    Nitrite, Urine NEGATIVE NEGATIVE    Leukocyte Esterase, Urine 25 Destinee/uL (A) NEGATIVE   Microscopic Only, Urine    Collection Time: 06/27/25  5:17 PM   Result Value Ref Range    WBC, Urine 6-10 (A) 1-5, NONE /HPF    RBC, Urine 6-10 (A) NONE, 1-2, 3-5 /HPF    Squamous Epithelial Cells, Urine 1-9 (SPARSE) Reference range not established. /HPF   Urine Culture    Collection Time: 06/27/25  5:17 PM    Specimen: Clean Catch/Voided; Urine   Result Value Ref Range    Urine Culture       Growth indicates contamination with mixed bacterial eliot. Repeat culture if clinically indicated.   CBC    Collection Time: 06/28/25  3:59 AM   Result Value Ref Range    WBC 10.3 4.4 - 11.3 x10*3/uL    nRBC 0.0 0.0 - 0.0 /100 WBCs    RBC 4.41 4.00 - 5.20 x10*6/uL    Hemoglobin 12.8 12.0 - 16.0 g/dL    Hematocrit 39.2 36.0 - 46.0 %    MCV 89 80 - 100 fL    MCH 29.0 26.0 - 34.0 pg    MCHC 32.7 32.0 - 36.0 g/dL    RDW 14.2 11.5 - 14.5 %    Platelets 221 150 - 450 x10*3/uL   Basic metabolic panel    Collection Time: 06/28/25  3:59 AM   Result Value Ref Range    Glucose 113 (H) 74 - 99 mg/dL    Sodium 137 136 - 145 mmol/L    Potassium 3.5 3.5 - 5.3 mmol/L    Chloride 104 98 - 107 mmol/L    Bicarbonate 26 21 - 32 mmol/L    Anion Gap 11 mmol/L    Urea Nitrogen 21 6 - 23 mg/dL    Creatinine 0.52 0.50 - 1.05 mg/dL    eGFR 89 >60 mL/min/1.73m*2    Calcium 8.5 (L) 8.6 - 10.3 mg/dL   Transthoracic Echo Complete    Collection Time: 06/28/25 10:50 AM   Result Value Ref Range    LVOT diam 1.69 cm    LV Biplane EF 72 %    MV E/A ratio 0.73     MV avg E/e' ratio 9.37     Tricuspid annular plane systolic excursion 2.1 cm    AV mn grad 3 mmHg    LA vol index A/L 25.7 ml/m2     AV pk lane 1.16 m/s    LV EF 63 %    RV free wall pk S' 16.44 cm/s    LV GLS 19.0 %    RVSP 19 mmHg    LVIDd 4.30 cm    Aortic Valve Area by Continuity of VTI 1.88 cm2    Aortic Valve Area by Continuity of Peak Velocity 1.89 cm2    AV pk grad 5 mmHg    LV A4C EF 66.4    CBC    Collection Time: 06/29/25  5:32 AM   Result Value Ref Range    WBC 8.7 4.4 - 11.3 x10*3/uL    nRBC 0.0 0.0 - 0.0 /100 WBCs    RBC 4.59 4.00 - 5.20 x10*6/uL    Hemoglobin 13.5 12.0 - 16.0 g/dL    Hematocrit 40.1 36.0 - 46.0 %    MCV 87 80 - 100 fL    MCH 29.4 26.0 - 34.0 pg    MCHC 33.7 32.0 - 36.0 g/dL    RDW 14.5 11.5 - 14.5 %    Platelets 233 150 - 450 x10*3/uL   Basic Metabolic Panel    Collection Time: 06/29/25  5:32 AM   Result Value Ref Range    Glucose 106 (H) 74 - 99 mg/dL    Sodium 137 136 - 145 mmol/L    Potassium 4.2 3.5 - 5.3 mmol/L    Chloride 106 98 - 107 mmol/L    Bicarbonate 25 21 - 32 mmol/L    Anion Gap 10 10 - 20 mmol/L    Urea Nitrogen 15 6 - 23 mg/dL    Creatinine 0.36 (L) 0.50 - 1.05 mg/dL    eGFR >90 >60 mL/min/1.73m*2    Calcium 8.5 (L) 8.6 - 10.3 mg/dL   Magnesium    Collection Time: 06/29/25  5:32 AM   Result Value Ref Range    Magnesium 2.04 1.60 - 2.40 mg/dL       CV Studies:  EKG:No results found for this or any previous visit (from the past 4464 hours).  Echocardiogram: No results found for this or any previous visit from the past 1825 days.    Stress Testing IMGRESULT(SMI9151:1:1825): No results found for this or any previous visit from the past 1825 days.    Cardiac Catheterization: No results found for this or any previous visit from the past 1825 days.  No results found for this or any previous visit from the past 3650 days.     Cardiac Scoring: No results found for this or any previous visit from the past 1825 days.    AAA : No results found for this or any previous visit from the past 1825 days.    OTHER: No results found for this or any previous visit from the past 1825 days.    LAST IMAGING  RESULTS  Transthoracic Echo Complete                [address] [address]       Phone [phone] Fax [phone]    TRANSTHORACIC ECHOCARDIOGRAM REPORT    Patient Name:       ENIO Ibarra Physician:    [id number] Lu Estrada DO  Study Date:         6/28/2025            Ordering Provider:    [id number] LU MATT                                                                 GAGAN  MRN/PID:            [id number]             Fellow:  Accession#:         [id number]         Nurse:  Date of Birth/Age:  1935 / 89 years Sonographer:          Joy Alvarez RDCS  Gender Assigned at  F                    Additional Staff:  Birth:  Height:             152.40 cm            Admit Date:           6/27/2025  Weight:             60.33 kg             Admission Status:     Inpatient -                                                                 Routine  BSA / BMI:          1.57 m2 / 25.97      Department Location:  [facility]                      kg/m2  Blood Pressure: 100 /64 mmHg    Study Type:    TRANSTHORACIC ECHO (TTE) COMPLETE  Diagnosis/ICD: Paroxysmal atrial fibrillation-I48.0  Indication:    Atrial Fibrillation  CPT Codes:     Echo Complete w Full [id number]    Patient History:  Pertinent History: HTN, AFIB, MVP.    Study Detail: The following Echo studies were performed: 2D, M-Mode, Doppler,                color flow and Strain. A bubble study was not performed.       PHYSICIAN INTERPRETATION:  Left Ventricle: The left ventricular systolic function is normal with a visually estimated ejection fraction of 60-65%. There is left ventricular hypertrophy involving the septal wall, with an asymmetric distribution. There are no regional wall motion abnormalities. The left ventricular cavity size is normal. There is normal septal and normal posterior left ventricular wall thickness. There is left ventricular  hypertrophy involving the septal wall. There is left ventricular concentric remodeling. Left Ventricular Global Longitudinal Strain - 19.0 %. Spectral Doppler shows a Grade I (impaired relaxation pattern) of left ventricular diastolic filling with normal left atrial filling pressure.  Left Atrium: The left atrial size is normal. A bubble study using agitated saline was not performed.  Right Ventricle: The right ventricle is normal in size. There is normal right ventricular global systolic function.  Right Atrium: The right atrial size is normal.  Aortic Valve: The aortic valve is trileaflet. The aortic valve area by VTI is 1.88 cmï¿½ with a peak velocity of 1.16 m/s. The peak and mean gradients are 5 mmHg and 3 mmHg, respectively, with a dimensionless index of 0.83. There is mild aortic valve regurgitation.  Mitral Valve: The mitral valve is normal in structure. There is trace mitral valve regurgitation. The E Vmax is 0.48 m/s.  Tricuspid Valve: The tricuspid valve is structurally normal. There is trace tricuspid regurgitation. The Doppler estimated right ventricular systolic pressure (RVSP) is within normal limits at 19 mmHg.  Pulmonic Valve: The pulmonic valve is structurally normal. There is trace pulmonic valve regurgitation.  Pericardium: No pericardial effusion noted. There is a pericardial fat pad present.  Aorta: The aortic root is normal.  Systemic Veins: The inferior vena cava appears normal in size, with IVC inspiratory collapse greater than 50%.       CONCLUSIONS:   1. The left ventricular systolic function is normal with a visually estimated ejection fraction of 60-65%.   2. Spectral Doppler shows a Grade I (impaired relaxation pattern) of left ventricular diastolic filling with normal left atrial filling pressure.   3. There is normal right ventricular global systolic function.   4. The Doppler estimated RVSP is within normal limits at 19 mmHg.   5. Mild aortic valve regurgitation.   6. Left  Ventricular Global Longitudinal Strain - 19.0 %.    QUANTITATIVE DATA SUMMARY:     2D MEASUREMENTS:             Normal Ranges:  LAs:             3.48 cm     (2.7-4.0cm)  IVSd:            0.82 cm     (0.6-1.1cm)  LVPWd:           0.94 cm     (0.6-1.1cm)  LVIDd:           4.30 cm     (3.9-5.9cm)  LVIDs:           2.98 cm  LV Mass Index:   76.1 g/m2  LVEDV Index:     38.31 ml/m2  LV % FS          30.6 %       LEFT ATRIUM:                  Normal Ranges:  LA Vol A4C:        38.2 ml    (22+/-6mL/m2)  LA Vol A2C:        41.6 ml  LA Vol BP:         40.3 ml  LA Vol Index A4C:  24.4 ml/m2  LA Vol Index A2C:  26.5 ml/m2  LA Vol Index BP:   25.7 ml/m2  LA Area A4C:       15.0 cm2  LA Area A2C:       15.8 cm2  LA Major Axis A4C: 5.0 cm  LA Major Axis A2C: 5.1 cm  LA Volume Index:   25.7 ml/m2  LA Vol A4C:        36.0 ml  LA Vol A2C:        39.5 ml  LA Vol Index BSA:  24.1 ml/m2       RIGHT ATRIUM:          Normal Ranges:  RA Area A4C:  12.1 cm2       AORTA MEASUREMENTS:         Normal Ranges:  Ao Sinus, d:        3.00 cm (2.1-3.5cm)  Ao STJ, d:          2.70 cm (1.7-3.4cm)  Asc Ao, d:          3.20 cm (2.1-3.4cm)       LV SYSTOLIC FUNCTION:                                         Normal Ranges:  EF-A4C View:                      66 % (>=55%)  EF-A2C View:                      77 %  EF-Biplane:                       72 %  EF-Visual:                        63 %  LV EF Reported:                   63 %  Global Longitudinal Strain (GLS): 19 %       LV DIASTOLIC FUNCTION:           Normal Ranges:  MV Peak E:             0.48 m/s  (0.7-1.2 m/s)  MV Peak A:             0.67 m/s  (0.42-0.7 m/s)  E/A Ratio:             0.73      (1.0-2.2)  MV e'                  0.052 m/s (>8.0)  MV lateral e'          0.06 m/s  MV medial e'           0.05 m/s  E/e' Ratio:            9.37      (<8.0)       MITRAL VALVE:          Normal Ranges:  MV DT:        274 msec (150-240msec)       AORTIC VALVE:                     Normal Ranges:  AoV Vmax:                 1.16 m/s (<=1.7m/s)  AoV Peak P.4 mmHg (<20mmHg)  AoV Mean PG:             3.0 mmHg (1.7-11.5mmHg)  LVOT Max Wagner:            0.97 m/s (<=1.1m/s)  AoV VTI:                 22.56 cm (18-25cm)  LVOT VTI:                18.81 cm  LVOT Diameter:           1.69 cm  (1.8-2.4cm)  AoV Area, VTI:           1.88 cm2 (2.5-5.5cm2)  AoV Area,Vmax:           1.89 cm2 (2.5-4.5cm2)  AoV Dimensionless Index: 0.83       AORTIC INSUFFICIENCY:  AI Vmax:       3.66 m/s  AI Half-time:  474 msec  AI Decel Time: 1633 msec  AI Decel Rate: 239.49 cm/s2       RIGHT VENTRICLE:  RV Basal 2.70 cm  RV Mid   2.50 cm  RV Major 5.9 cm  TAPSE:   21.1 mm  RV s'    0.16 m/s       TRICUSPID VALVE/RVSP:          Normal Ranges:  Peak TR Velocity:     1.99 m/s  Est. RA Pressure:     3 mmHg  RV Syst Pressure:     19 mmHg  (< 30mmHg)  IVC Diam:             1.54 cm       AORTA:  Asc Ao Diam 3.21 cm       [id number] Jason Estrada DO  Electronically signed on 2025 at 12:55:05 PM       ** Final **        Jason Estrada DO, FACC, FACOI  2025  9:38 AM           [1]   Family History  Problem Relation Name Age of Onset    Lung cancer Other     [2]   No current facility-administered medications on file prior to encounter.     Current Outpatient Medications on File Prior to Encounter   Medication Sig Dispense Refill    acebutolol (Sectral) 200 mg capsule Take 1 capsule (200 mg) by mouth once daily. 90 capsule 1    diclofenac sodium (Voltaren) 1 % gel Apply 4.5 inches (4 g) topically 4 times a day. (Patient not taking: Reported on 2025) 200 g 11    EPINEPHrine (EpiPen Jr) 0.15 mg/0.3 mL injection syringe Inject 0.3 mL (0.15 mg) as directed 1 time.      fluticasone (Flonase) 50 mcg/actuation nasal spray Administer 1 spray into each nostril once daily. 16 g 2    furosemide (Lasix) 40 mg tablet Take 1 tablet (40 mg) by mouth once daily. 90 tablet 1    potassium chloride ER (Micro-K) 10 mEq ER capsule Take 1 capsule (10 mEq) by mouth once daily. 90  capsule 1   [3] acebutolol, 200 mg, oral, Daily  apixaban, 5 mg, oral, q12h  cefTRIAXone, 1 g, intravenous, q24h  fluticasone, 1 spray, Each Nostril, Daily  pantoprazole, 40 mg, oral, Daily before breakfast   Or  pantoprazole, 40 mg, intravenous, Daily before breakfast  potassium chloride CR, 20 mEq, oral, Daily     [4]    [5] PRN medications: guaiFENesin, melatonin, ondansetron **OR** ondansetron, polyethylene glycol  [6]   Medications Prior to Admission   Medication Sig Dispense Refill Last Dose/Taking    acebutolol (Sectral) 200 mg capsule Take 1 capsule (200 mg) by mouth once daily. 90 capsule 1     diclofenac sodium (Voltaren) 1 % gel Apply 4.5 inches (4 g) topically 4 times a day. (Patient not taking: Reported on 6/16/2025) 200 g 11     EPINEPHrine (EpiPen Jr) 0.15 mg/0.3 mL injection syringe Inject 0.3 mL (0.15 mg) as directed 1 time.       fluticasone (Flonase) 50 mcg/actuation nasal spray Administer 1 spray into each nostril once daily. 16 g 2     furosemide (Lasix) 40 mg tablet Take 1 tablet (40 mg) by mouth once daily. 90 tablet 1     potassium chloride ER (Micro-K) 10 mEq ER capsule Take 1 capsule (10 mEq) by mouth once daily. 90 capsule 1

## 2025-06-29 NOTE — PROGRESS NOTES
Enio Herron is a 89 y.o. female on day 2 of admission presenting with Urinary tract infection with hematuria, site unspecified.      Subjective   Enio Herron is a 89 y.o. female with PMHx s/f HTN, hypokalemia, osteoporosis, palpitations (presumed afib), Parkinsons presenting with weakness and fall.  Initial workup in ER shows tachycardia as well as elevated blood pressure, mild leukocytosis with WBC of 12, UA suggestive of UTI.  She was admitted with a diagnosis of new onset of A-fib with RVR as well as UTI leading to weakness and fall.  Status post Cardizem IV push in ER, started on IV Rocephin, Lovenox at therapeutic dose, IV fluid.  Echocardiogram ordered and cardiology consulted.  Evaluated by cardiology and recommendation appreciated.  Rhythm converted to NSR, anticoagulation switched to oral Eliquis.  Discharge planning to [facility]-awaiting acceptance.     6/29/2025 patient was evaluate this morning, feeling better, heart rate fairly controlled and converted to NSR.  Denies having chest pain or shortness of breath at rest.       Objective     Last Recorded Vitals  /80 (BP Location: Right arm, Patient Position: Lying)   Pulse 73   Temp 36.5 °C (97.7 °F) (Temporal)   Resp 16   Wt 64.9 kg (143 lb 1.3 oz)   SpO2 93%   Intake/Output last 3 Shifts:    Intake/Output Summary (Last 24 hours) at 6/29/2025 1048  Last data filed at 6/29/2025 0629  Gross per 24 hour   Intake 480 ml   Output 600 ml   Net -120 ml       Admission Weight  Weight: 59 kg (130 lb) (06/27/25 1405)    Daily Weight  06/29/25 : 64.9 kg (143 lb 1.3 oz)    Image Results  Transthoracic Echo Complete                [place] [id number]       Phone [phone] Fax [phone]    TRANSTHORACIC ECHOCARDIOGRAM REPORT    Patient Name:       ENIO HERRON      Reading Physician:    [id number] Jason Estrada DO  Study Date:          6/28/2025            Ordering Provider:    [id number] LU FARAH  MRN/PID:            [id number]             Fellow:  Accession#:         [id number]         Nurse:  Date of Birth/Age:  1935 / 89 years Sonographer:          Joy Alvarez RDCS  Gender Assigned at  F                    Additional Staff:  Birth:  Height:             152.40 cm            Admit Date:           6/27/2025  Weight:             60.33 kg             Admission Status:     Inpatient -                                                                 Routine  BSA / BMI:          1.57 m2 / 25.97      [facility]                      kg/m2  Blood Pressure: 100 /64 mmHg    Study Type:    TRANSTHORACIC ECHO (TTE) COMPLETE  Diagnosis/ICD: Paroxysmal atrial fibrillation-I48.0  Indication:    Atrial Fibrillation  CPT Codes:     Echo Complete w Full [id number]    Patient History:  Pertinent History: HTN, AFIB, MVP.    Study Detail: The following Echo studies were performed: 2D, M-Mode, Doppler,                color flow and Strain. A bubble study was not performed.       PHYSICIAN INTERPRETATION:  Left Ventricle: The left ventricular systolic function is normal with a visually estimated ejection fraction of 60-65%. There is left ventricular hypertrophy involving the septal wall, with an asymmetric distribution. There are no regional wall motion abnormalities. The left ventricular cavity size is normal. There is normal septal and normal posterior left ventricular wall thickness. There is left ventricular hypertrophy involving the septal wall. There is left ventricular concentric remodeling. Left Ventricular Global Longitudinal Strain - 19.0 %. Spectral Doppler shows a Grade I (impaired relaxation pattern) of left ventricular diastolic filling with normal left atrial filling pressure.  Left [facility]: The left atrial size is normal. A bubble study using agitated saline  was not performed.  Right Ventricle: The right ventricle is normal in size. There is normal right ventricular global systolic function.  Right [facility]: The right atrial size is normal.  Aortic Valve: The aortic valve is trileaflet. The aortic valve area by VTI is 1.88 cmï¿½ with a peak velocity of 1.16 m/s. The peak and mean gradients are 5 mmHg and 3 mmHg, respectively, with a dimensionless index of 0.83. There is mild aortic valve regurgitation.  Mitral Valve: The mitral valve is normal in structure. There is trace mitral valve regurgitation. The E Vmax is 0.48 m/s.  Tricuspid Valve: The tricuspid valve is structurally normal. There is trace tricuspid regurgitation. The Doppler estimated right ventricular systolic pressure (RVSP) is within normal limits at 19 mmHg.  Pulmonic Valve: The pulmonic valve is structurally normal. There is trace pulmonic valve regurgitation.  Pericardium: No pericardial effusion noted. There is a pericardial fat pad present.  Aorta: The aortic root is normal.  Systemic Veins: The inferior vena cava appears normal in size, with IVC inspiratory collapse greater than 50%.       CONCLUSIONS:   1. The left ventricular systolic function is normal with a visually estimated ejection fraction of 60-65%.   2. Spectral Doppler shows a Grade I (impaired relaxation pattern) of left ventricular diastolic filling with normal left atrial filling pressure.   3. There is normal right ventricular global systolic function.   4. The Doppler estimated RVSP is within normal limits at 19 mmHg.   5. Mild aortic valve regurgitation.   6. Left Ventricular Global Longitudinal Strain - 19.0 %.    QUANTITATIVE DATA SUMMARY:     2D MEASUREMENTS:             Normal Ranges:  LAs:             3.48 cm     (2.7-4.0cm)  IVSd:            0.82 cm     (0.6-1.1cm)  LVPWd:           0.94 cm     (0.6-1.1cm)  LVIDd:           4.30 cm     (3.9-5.9cm)  LVIDs:           2.98 cm  LV Mass Index:   76.1 g/m2  LVEDV Index:     38.31  ml/m2  LV % FS          30.6 %       LEFT ATRIUM:                  Normal Ranges:  LA Vol A4C:        38.2 ml    (22+/-6mL/m2)  LA Vol A2C:        41.6 ml  LA Vol BP:         40.3 ml  LA Vol Index A4C:  24.4 ml/m2  LA Vol Index A2C:  26.5 ml/m2  LA Vol Index BP:   25.7 ml/m2  LA Area A4C:       15.0 cm2  LA Area A2C:       15.8 cm2  LA Major Axis A4C: 5.0 cm  LA Major Axis A2C: 5.1 cm  LA Volume Index:   25.7 ml/m2  LA Vol A4C:        36.0 ml  LA Vol A2C:        39.5 ml  LA Vol Index BSA:  24.1 ml/m2       RIGHT ATRIUM:          Normal Ranges:  RA Area A4C:  12.1 cm2       AORTA MEASUREMENTS:         Normal Ranges:  Ao Sinus, d:        3.00 cm (2.1-3.5cm)  Ao STJ, d:          2.70 cm (1.7-3.4cm)  Asc Ao, d:          3.20 cm (2.1-3.4cm)       LV SYSTOLIC FUNCTION:                                         Normal Ranges:  EF-A4C View:                      66 % (>=55%)  EF-A2C View:                      77 %  EF-Biplane:                       72 %  EF-Visual:                        63 %  LV EF Reported:                   63 %  Global Longitudinal Strain (GLS): 19 %       LV DIASTOLIC FUNCTION:           Normal Ranges:  MV Peak E:             0.48 m/s  (0.7-1.2 m/s)  MV Peak A:             0.67 m/s  (0.42-0.7 m/s)  E/A Ratio:             0.73      (1.0-2.2)  MV e'                  0.052 m/s (>8.0)  MV lateral e'          0.06 m/s  MV medial e'           0.05 m/s  E/e' Ratio:            9.37      (<8.0)       MITRAL VALVE:          Normal Ranges:  MV DT:        274 msec (150-240msec)       AORTIC VALVE:                     Normal Ranges:  AoV Vmax:                1.16 m/s (<=1.7m/s)  AoV Peak P.4 mmHg (<20mmHg)  AoV Mean PG:             3.0 mmHg (1.7-11.5mmHg)  LVOT Max Wagner:            0.97 m/s (<=1.1m/s)  AoV VTI:                 22.56 cm (18-25cm)  LVOT VTI:                18.81 cm  LVOT Diameter:           1.69 cm  (1.8-2.4cm)  AoV Area, VTI:           1.88 cm2 (2.5-5.5cm2)  AoV Area,Vmax:           1.89  cm2 (2.5-4.5cm2)  AoV Dimensionless Index: 0.83       AORTIC INSUFFICIENCY:  AI Vmax:       3.66 m/s  AI Half-time:  474 msec  AI Decel Time: 1633 msec  AI Decel Rate: 239.49 cm/s2       RIGHT VENTRICLE:  RV Basal 2.70 cm  RV Mid   2.50 cm  RV Major 5.9 cm  TAPSE:   21.1 mm  RV s'    0.16 m/s       TRICUSPID VALVE/RVSP:          Normal Ranges:  Peak TR Velocity:     1.99 m/s  Est. RA Pressure:     3 mmHg  RV Syst Pressure:     19 mmHg  (< 30mmHg)  IVC Diam:             1.54 cm       AORTA:  Asc Ao Diam 3.21 cm       [id number] Jason Natalie DO  Electronically signed on 6/28/2025 at 12:55:05 PM       ** Final **      Physical Exam  Patient is awake and orient, not in apparent distress  Eyes: PERRLA, no conjunctival congestion  Chest: Bilateral Air entry, no crackles or wheezing  Heart: s1S2 regular, no murmur  Abdomen: Soft, non tender, BS present  Ext: Deformed left hand  Relevant Results               This patient currently has cardiac telemetry ordered; if you would like to modify or discontinue the telemetry order, click here to go to the orders activity to modify/discontinue the order.              Assessment & Plan    A-fib with RVR  On acebutolol for rate control as well as Eliquis for anticoagulation  Echocardiogram shows normal LV systolic function  Rhythm converted to NSR  Discharge planning to [facility] for rehab-awaiting subtends    Acute cystitis with hematuria  On IV Rocephin-D3  Follow cultures-growth indicates contamination    Hypertension  Continue current medication, monitor blood pressure and adjust as needed.    Physical debility/deconditioning  PT/OT     Parkinsonism  Not on any medication  Neurology follow-up as an outpatient    Neyda Galvan MD

## 2025-06-29 NOTE — CARE PLAN
The clinical goals for the shift include patient will remain NSR throughout shift        Problem: Pain - Adult  Goal: Verbalizes/displays adequate comfort level or baseline comfort level  Outcome: Progressing     Problem: Safety - Adult  Goal: Free from fall injury  Outcome: Progressing     Problem: Chronic Conditions and Co-morbidities  Goal: Patient's chronic conditions and co-morbidity symptoms are monitored and maintained or improved  Outcome: Progressing

## 2025-06-30 ENCOUNTER — TELEPHONE (OUTPATIENT)
Dept: PRIMARY CARE | Facility: CLINIC | Age: 89
End: 2025-06-30
Payer: MEDICARE

## 2025-06-30 LAB — HOLD SPECIMEN: NORMAL

## 2025-06-30 PROCEDURE — 2500000002 HC RX 250 W HCPCS SELF ADMINISTERED DRUGS (ALT 637 FOR MEDICARE OP, ALT 636 FOR OP/ED): Performed by: STUDENT IN AN ORGANIZED HEALTH CARE EDUCATION/TRAINING PROGRAM

## 2025-06-30 PROCEDURE — 99232 SBSQ HOSP IP/OBS MODERATE 35: CPT | Performed by: NURSE PRACTITIONER

## 2025-06-30 PROCEDURE — 97535 SELF CARE MNGMENT TRAINING: CPT | Mod: GO,CO

## 2025-06-30 PROCEDURE — 2500000001 HC RX 250 WO HCPCS SELF ADMINISTERED DRUGS (ALT 637 FOR MEDICARE OP): Performed by: INTERNAL MEDICINE

## 2025-06-30 PROCEDURE — 97530 THERAPEUTIC ACTIVITIES: CPT | Mod: GO,CO

## 2025-06-30 PROCEDURE — 2500000004 HC RX 250 GENERAL PHARMACY W/ HCPCS (ALT 636 FOR OP/ED): Performed by: STUDENT IN AN ORGANIZED HEALTH CARE EDUCATION/TRAINING PROGRAM

## 2025-06-30 PROCEDURE — 2060000001 HC INTERMEDIATE ICU ROOM DAILY

## 2025-06-30 PROCEDURE — 99233 SBSQ HOSP IP/OBS HIGH 50: CPT | Performed by: INTERNAL MEDICINE

## 2025-06-30 PROCEDURE — 2500000001 HC RX 250 WO HCPCS SELF ADMINISTERED DRUGS (ALT 637 FOR MEDICARE OP): Performed by: STUDENT IN AN ORGANIZED HEALTH CARE EDUCATION/TRAINING PROGRAM

## 2025-06-30 RX ORDER — OXYCODONE HYDROCHLORIDE 5 MG/1
5 TABLET ORAL EVERY 4 HOURS PRN
Refills: 0 | Status: DISCONTINUED | OUTPATIENT
Start: 2025-06-30 | End: 2025-07-04 | Stop reason: HOSPADM

## 2025-06-30 RX ORDER — CYCLOBENZAPRINE HCL 10 MG
5 TABLET ORAL 3 TIMES DAILY PRN
Status: DISCONTINUED | OUTPATIENT
Start: 2025-06-30 | End: 2025-07-04 | Stop reason: HOSPADM

## 2025-06-30 RX ADMIN — CEFTRIAXONE 1 G: 1 INJECTION, SOLUTION INTRAVENOUS at 20:23

## 2025-06-30 RX ADMIN — POTASSIUM CHLORIDE EXTENDED-RELEASE 20 MEQ: 1500 TABLET ORAL at 08:30

## 2025-06-30 RX ADMIN — OXYCODONE HYDROCHLORIDE 5 MG: 5 TABLET ORAL at 20:23

## 2025-06-30 RX ADMIN — FLUTICASONE PROPIONATE 1 SPRAY: 50 SPRAY, METERED NASAL at 08:30

## 2025-06-30 RX ADMIN — PANTOPRAZOLE SODIUM 40 MG: 40 INJECTION, POWDER, FOR SOLUTION INTRAVENOUS at 06:09

## 2025-06-30 RX ADMIN — APIXABAN 5 MG: 5 TABLET, FILM COATED ORAL at 08:30

## 2025-06-30 RX ADMIN — APIXABAN 5 MG: 5 TABLET, FILM COATED ORAL at 20:23

## 2025-06-30 RX ADMIN — OXYCODONE HYDROCHLORIDE 5 MG: 5 TABLET ORAL at 09:22

## 2025-06-30 RX ADMIN — ACEBUTOLOL HYDROCHLORIDE 200 MG: 200 CAPSULE ORAL at 08:30

## 2025-06-30 ASSESSMENT — COGNITIVE AND FUNCTIONAL STATUS - GENERAL
DAILY ACTIVITIY SCORE: 14
DRESSING REGULAR UPPER BODY CLOTHING: A LITTLE
DRESSING REGULAR LOWER BODY CLOTHING: A LITTLE
MOVING FROM LYING ON BACK TO SITTING ON SIDE OF FLAT BED WITH BEDRAILS: A LITTLE
DRESSING REGULAR LOWER BODY CLOTHING: A LOT
CLIMB 3 TO 5 STEPS WITH RAILING: A LITTLE
TOILETING: A LOT
TOILETING: A LITTLE
TURNING FROM BACK TO SIDE WHILE IN FLAT BAD: A LITTLE
PERSONAL GROOMING: A LITTLE
HELP NEEDED FOR BATHING: A LITTLE
DRESSING REGULAR UPPER BODY CLOTHING: A LOT
MOVING TO AND FROM BED TO CHAIR: A LITTLE
PERSONAL GROOMING: A LITTLE
MOBILITY SCORE: 18
EATING MEALS: A LITTLE
DAILY ACTIVITIY SCORE: 19
WALKING IN HOSPITAL ROOM: A LITTLE
STANDING UP FROM CHAIR USING ARMS: A LITTLE
HELP NEEDED FOR BATHING: A LOT

## 2025-06-30 ASSESSMENT — PAIN DESCRIPTION - LOCATION: LOCATION: HAND

## 2025-06-30 ASSESSMENT — ENCOUNTER SYMPTOMS
LIGHT-HEADEDNESS: 0
DECREASED APPETITE: 0
GASTROINTESTINAL NEGATIVE: 1
BLURRED VISION: 0
PALPITATIONS: 0
RESPIRATORY NEGATIVE: 1
FALLS: 1
FOCAL WEAKNESS: 0
IRREGULAR HEARTBEAT: 0
MEMORY LOSS: 0
ORTHOPNEA: 0
DEPRESSION: 0
SHORTNESS OF BREATH: 0
COUGH: 0
DYSPNEA ON EXERTION: 0

## 2025-06-30 ASSESSMENT — PAIN SCALES - GENERAL
PAINLEVEL_OUTOF10: 0 - NO PAIN
PAINLEVEL_OUTOF10: 10 - WORST POSSIBLE PAIN
PAINLEVEL_OUTOF10: 5 - MODERATE PAIN

## 2025-06-30 ASSESSMENT — ACTIVITIES OF DAILY LIVING (ADL): HOME_MANAGEMENT_TIME_ENTRY: 13

## 2025-06-30 ASSESSMENT — PAIN - FUNCTIONAL ASSESSMENT
PAIN_FUNCTIONAL_ASSESSMENT: 0-10

## 2025-06-30 ASSESSMENT — PAIN DESCRIPTION - ORIENTATION: ORIENTATION: LEFT

## 2025-06-30 NOTE — TELEPHONE ENCOUNTER
Patient's son returned call and message was relayed.  Patient's son relayed that patient was found on the floor after 48 hours and is currently hospitalized.

## 2025-06-30 NOTE — CARE PLAN
The clinical goals for the shift include patient terence be free from falls and injury      Throughout the shift, the nurse will monitor and manage the patients UTI by:  Assess for signs and symptoms of urinary tract infection.  Managing pain   Monitor laboratory and diagnostic studies, as indicated  Encourage the client to increase oral fluid intake unless contraindicated  Administer antibacterial agents as indicated.  Promote optimal rest  Palpate the bladder for distention and observe for overflow      Problem: Skin  Goal: Decreased wound size/increased tissue granulation at next dressing change  Outcome: Progressing  Goal: Participates in plan/prevention/treatment measures  Outcome: Progressing  Goal: Prevent/manage excess moisture  Outcome: Progressing  Goal: Prevent/minimize sheer/friction injuries  Outcome: Progressing  Flowsheets (Taken 6/30/2025 1723)  Prevent/minimize sheer/friction injuries: Turn/reposition every 2 hours/use positioning/transfer devices  Goal: Promote/optimize nutrition  Outcome: Progressing  Goal: Promote skin healing  Outcome: Progressing     Problem: Pain - Adult  Goal: Verbalizes/displays adequate comfort level or baseline comfort level  Outcome: Progressing     Problem: Safety - Adult  Goal: Free from fall injury  Outcome: Progressing     Problem: Discharge Planning  Goal: Discharge to home or other facility with appropriate resources  Outcome: Progressing     Problem: Chronic Conditions and Co-morbidities  Goal: Patient's chronic conditions and co-morbidity symptoms are monitored and maintained or improved  Outcome: Progressing     Problem: Nutrition  Goal: Nutrient intake appropriate for maintaining nutritional needs  Outcome: Progressing

## 2025-06-30 NOTE — TELEPHONE ENCOUNTER
----- Message from Rohit Villagran sent at 6/29/2025 11:18 PM EDT -----  Stable lab work result, cont current meds. Follow up as scheduled. -Dr. Villagran  ----- Message -----  From: [email or web address] Results In  Sent: 6/24/2025   9:21 AM EDT  To: Rohit Villagran MD

## 2025-06-30 NOTE — PROGRESS NOTES
HPI:  HPI   Nadja Rodriguez is a 89 y.o. female admitted after a fall at home where she was unable to get up for approximately 2 days.  Patient was subsequently brought to the emergency department for evaluation.  Patient had a rapid response where she was noted to have atrial fibrillation with RVR and a heart rate of 170 bpm.  Cardiology was consulted for additional recommendations regarding her atrial fibrillation.  Telemetry currently showing sinus rhythm with heart rate in the 70s.  BMP showed a serum sodium 137, serum potassium 3.5, serum creatinine is 0.52.  Serum magnesium was 1.95.  CK was 368.  CBC showed hemoglobin 12.8. Hip x-ray showed no fracture or dislocation with degenerative changes.  CT scan of the chest/abdomen/pelvis showed no acute intrathoracic abnormality, emphysematous change with component of chronic appearing interstitial changes demonstrated, no acute compression fracture within the thoracic or lumbar spine, no acute intra-abdominal abnormality, no hip fracture demonstrated, bladder is distended, moderate narrowing thecal sac L3/4.  During my exam patient was resting comfortably in bed.       Subjective Data:  6/29: Patient states she feels well this morning without any new complaints.  Telemetry showing sinus rhythm with heart rate in the 60s.  Lab work showing normal serum sodium and potassium with a serum creatinine 0.36.  Echocardiogram done yesterday showing normal left ventricular systolic function with an ejection fraction of 60 to 65%, normal right ventricular systolic function, and mild aortic valve regurgitation.  During my exam patient was resting comfortably.  6-30-25: No issues overnight.  No CP/dyspnea. HR and BP are stable.  K 4.2, creatinine 0.36, mag 2.04.  Monitor: SR. Echo reviewed, EF 60-65%, mild AI    Overnight Events:    None     Objective Data:  Last Recorded Vitals:  Vitals:    06/29/25 2036 06/29/25 2319 06/30/25 0417 06/30/25 0823   BP: 122/77 125/77 121/82  130/83   BP Location: Right arm Right arm Right arm Right arm   Patient Position: Lying Lying Lying Lying   Pulse: 84 69 78 98   Resp: 14 12 14 16   Temp: 36.8 °C (98.2 °F) 36.2 °C (97.2 °F) 36.1 °C (97 °F) 36.4 °C (97.5 °F)   TempSrc: Temporal Temporal Temporal Temporal   SpO2: 94% 93% 92% 91%   Weight:   62.4 kg (137 lb 9.1 oz)    Height:           Last Labs:    Results from last 7 days   Lab Units 06/29/25  0532 06/28/25  0359 06/27/25  1408   SODIUM mmol/L 137 137 139   POTASSIUM mmol/L 4.2 3.5 3.4*   CHLORIDE mmol/L 106 104 104   CO2 mmol/L 25 26 25   BUN mg/dL 15 21 16   CREATININE mg/dL 0.36* 0.52 0.40*   GLUCOSE mg/dL 106* 113* 106*   CALCIUM mg/dL 8.5* 8.5* 9.2        Results from last 7 days   Lab Units 06/29/25  0532 06/28/25  0359 06/27/25  1408   WBC AUTO x10*3/uL 8.7 10.3 12.0*   HEMOGLOBIN g/dL 13.5 12.8 15.1   HEMATOCRIT % 40.1 39.2 45.2   PLATELETS AUTO x10*3/uL 233 221 243               Last I/O:  I/O last 3 completed shifts:  In: - (0 mL/kg)   Out: 760 (12.2 mL/kg) [Urine:760 (0.3 mL/kg/hr)]  Weight: 62.4 kg     Past Cardiology Tests (Last 3 Years):    Echo:  CONCLUSIONS:   1. The left ventricular systolic function is normal with a visually estimated ejection fraction of 60-65%.   2. Spectral Doppler shows a Grade I (impaired relaxation pattern) of left ventricular diastolic filling with normal left atrial filling pressure.   3. There is normal right ventricular global systolic function.   4. The Doppler estimated RVSP is within normal limits at 19 mmHg.   5. Mild aortic valve regurgitation.   6. Left Ventricular Global Longitudinal Strain - 19.0 %.    Inpatient Medications:  Scheduled Medications[1]  PRN Medications[2]  Continuous Medications[3]    ROS:  Review of Systems   Constitutional: Positive for malaise/fatigue. Negative for decreased appetite.   HENT: Negative.     Eyes:  Negative for blurred vision and visual disturbance.   Cardiovascular:  Negative for chest pain, dyspnea on exertion,  irregular heartbeat, leg swelling, orthopnea and palpitations.   Respiratory: Negative.  Negative for cough and shortness of breath.    Musculoskeletal:  Positive for arthritis and falls.   Gastrointestinal: Negative.    Neurological:  Negative for focal weakness and light-headedness.        Generalized weakness   Psychiatric/Behavioral:  Negative for depression and memory loss.         Physical Exam:  Physical Exam  Constitutional:       Appearance: Normal appearance.   HENT:      Head: Normocephalic.   Eyes:      Conjunctiva/sclera: Conjunctivae normal.   Cardiovascular:      Rate and Rhythm: Normal rate and regular rhythm.      Pulses: Normal pulses.      Heart sounds: S1 normal and S2 normal. No murmur heard.     No friction rub. No gallop.   Pulmonary:      Effort: Pulmonary effort is normal.      Breath sounds: Normal breath sounds.   Abdominal:      General: Bowel sounds are normal.      Palpations: Abdomen is soft.      Tenderness: There is no abdominal tenderness.   Musculoskeletal:      Cervical back: Neck supple.      Comments: Trace LE edema   Skin:     General: Skin is warm and dry.   Neurological:      General: No focal deficit present.      Mental Status: She is alert and oriented to person, place, and time.   Psychiatric:         Attention and Perception: Attention normal.         Mood and Affect: Mood normal.          Assessment/Plan   1.  Paroxysmal atrial fibrillation  Patient reported to have an episode of paroxysmal atrial fibrillation which spontaneously resolved.  Telemetry currently showing sinus rhythm with heart rate in the 70s.  She does have an elevated [id number] score and has been placed on enoxaparin.  This can be changed to DOAC therapy such as apixaban 5 mg twice daily prior to discharge.  Check echocardiogram.  Patient is on acebutolol for heart rate control which can be continued.  Continue to monitor on telemetry while here.     6/29: Continue apixaban and acebutolol therapy.  Echocardiogram done yesterday showing normal left ventricular systolic function with an ejection fraction of 60 to 65%, normal right ventricular systolic function, and mild aortic valve regurgitation.  Telemetry showing sinus rhythm and heart rate in the 60s.  Patient will need to follow-up outpatient.  6-30-25: Remains SR on tele.  No further Afib. Continue on Eliquis.  for stroke prevention.  Patient is going to [facility] so will have assistance and supervision.       2.  Hypertension  The patient has a history of hypertension which appears controlled this morning.  Continue to monitor and adjust antihypertensive medical therapy as necessary.     6/29: Stable, continue to monitor and adjust antihypertensive medical therapy as necessary.  6-30-25: BP stable.      3.  Fall/weakness  Patient had a fall at home and was unable to get up for approximately 48 hours.  Imaging did not show any fractures.  Management per hospitalist service.  6-30-25: To [facility] on d/c per patient report.      4.  History of parkinsonism  Management per hospitalist service     5.  Aortic valve regurgitation  Echocardiogram done yesterday showing normal left ventricular systolic function with an ejection fraction of 60 to 65%, normal right ventricular systolic function, and mild aortic valve regurgitation.  Continue to monitor clinically for now.  6-30-25: Can be followed serially in the outpt setting.  Patient tells me she will be moving closer to family. Can establish medical care there.     Will sign off.            Code Status:  Full Code          Swapna Chavira, CRYS-CNP           [1]   Scheduled medications   Medication Dose Route Frequency    acebutolol  200 mg oral Daily    apixaban  5 mg oral q12h    cefTRIAXone  1 g intravenous q24h    fluticasone  1 spray Each Nostril Daily    pantoprazole  40 mg oral Daily before breakfast    Or    pantoprazole  40 mg intravenous Daily before breakfast    potassium chloride CR  20 mEq oral Daily   [2]    PRN medications   Medication    cyclobenzaprine    guaiFENesin    melatonin    ondansetron    Or    ondansetron    oxyCODONE    polyethylene glycol   [3]   Continuous Medications   Medication Dose Last Rate

## 2025-06-30 NOTE — PROGRESS NOTES
Enio Herron is a 89 y.o. female on day 3 of admission presenting with Urinary tract infection with hematuria, site unspecified.      Subjective   Enio Herron is a 89 y.o. female with PMHx s/f HTN, hypokalemia, osteoporosis, palpitations (presumed afib), Parkinsons presenting with weakness and fall.  Initial workup in ER shows tachycardia as well as elevated blood pressure, mild leukocytosis with WBC of 12, UA suggestive of UTI.  She was admitted with a diagnosis of new onset of A-fib with RVR as well as UTI leading to weakness and fall.  Status post Cardizem IV push in ER, started on IV Rocephin, Lovenox at therapeutic dose, IV fluid.  Echocardiogram ordered and cardiology consulted.  Evaluated by cardiology and recommendation appreciated.  Rhythm converted to NSR, anticoagulation switched to oral Eliquis.  Discharge planning to [facility]-awaiting acceptance.    6/30: She was struggling with chronic L hand pain this AM. 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    Objective     Last Recorded Vitals  /83 (BP Location: Right arm, Patient Position: Lying)   Pulse 81   Temp 36.6 °C (97.9 °F) (Temporal)   Resp 16   Wt 62.4 kg (137 lb 9.1 oz)   SpO2 90%   Intake/Output last 3 Shifts:    Intake/Output Summary (Last 24 hours) at 6/30/2025 1306  Last data filed at 6/30/2025 0417  Gross per 24 hour   Intake --   Output 410 ml   Net -410 ml       Admission Weight  Weight: 59 kg (130 lb) (06/27/25 1405)    Daily Weight  06/30/25 : 62.4 kg (137 lb 9.1 oz)    Image Results  Transthoracic Echo Complete                [place] [id number]       Phone [phone] Fax [phone]    TRANSTHORACIC ECHOCARDIOGRAM REPORT    Patient Name:       ENIO HERRON      Reading Physician:    [id number] Jason Estrada DO  Study Date:         6/28/2025            Ordering Provider:     [id number] LU FARAH  MRN/PID:            [id number]             Fellow:  Accession#:         [id number]         Nurse:  Date of Birth/Age:  1935 / 89 years Sonographer:          Joy Alvarez RDCS  Gender Assigned at  F                    Additional Staff:  Birth:  Height:             152.40 cm            Admit Date:           6/27/2025  Weight:             60.33 kg             Admission Status:     Inpatient -                                                                 Routine  BSA / BMI:          1.57 m2 / 25.97      Department Location:  [facility]                      kg/m2  Blood Pressure: 100 /64 mmHg    Study Type:    TRANSTHORACIC ECHO (TTE) COMPLETE  Diagnosis/ICD: Paroxysmal atrial fibrillation-I48.0  Indication:    Atrial Fibrillation  CPT Codes:     Echo Complete w Full [id number]    Patient History:  Pertinent History: HTN, AFIB, MVP.    Study Detail: The following Echo studies were performed: 2D, M-Mode, Doppler,                color flow and Strain. A bubble study was not performed.       PHYSICIAN INTERPRETATION:  Left Ventricle: The left ventricular systolic function is normal with a visually estimated ejection fraction of 60-65%. There is left ventricular hypertrophy involving the septal wall, with an asymmetric distribution. There are no regional wall motion abnormalities. The left ventricular cavity size is normal. There is normal septal and normal posterior left ventricular wall thickness. There is left ventricular hypertrophy involving the septal wall. There is left ventricular concentric remodeling. Left Ventricular Global Longitudinal Strain - 19.0 %. Spectral Doppler shows a Grade I (impaired relaxation pattern) of left ventricular diastolic filling with normal left atrial filling pressure.  Left Atrium: The left atrial size is normal. A bubble study using agitated saline was not performed.  Right Ventricle: The right  ventricle is normal in size. There is normal right ventricular global systolic function.  Right Atrium: The right atrial size is normal.  Aortic Valve: The aortic valve is trileaflet. The aortic valve area by VTI is 1.88 cmï¿½ with a peak velocity of 1.16 m/s. The peak and mean gradients are 5 mmHg and 3 mmHg, respectively, with a dimensionless index of 0.83. There is mild aortic valve regurgitation.  Mitral Valve: The mitral valve is normal in structure. There is trace mitral valve regurgitation. The E Vmax is 0.48 m/s.  Tricuspid Valve: The tricuspid valve is structurally normal. There is trace tricuspid regurgitation. The Doppler estimated right ventricular systolic pressure (RVSP) is within normal limits at 19 mmHg.  Pulmonic Valve: The pulmonic valve is structurally normal. There is trace pulmonic valve regurgitation.  Pericardium: No pericardial effusion noted. There is a pericardial fat pad present.  Aorta: The aortic root is normal.  Systemic Veins: The inferior vena cava appears normal in size, with IVC inspiratory collapse greater than 50%.       CONCLUSIONS:   1. The left ventricular systolic function is normal with a visually estimated ejection fraction of 60-65%.   2. Spectral Doppler shows a Grade I (impaired relaxation pattern) of left ventricular diastolic filling with normal left atrial filling pressure.   3. There is normal right ventricular global systolic function.   4. The Doppler estimated RVSP is within normal limits at 19 mmHg.   5. Mild aortic valve regurgitation.   6. Left Ventricular Global Longitudinal Strain - 19.0 %.    QUANTITATIVE DATA SUMMARY:     2D MEASUREMENTS:             Normal Ranges:  LAs:             3.48 cm     (2.7-4.0cm)  IVSd:            0.82 cm     (0.6-1.1cm)  LVPWd:           0.94 cm     (0.6-1.1cm)  LVIDd:           4.30 cm     (3.9-5.9cm)  LVIDs:           2.98 cm  LV Mass Index:   76.1 g/m2  LVEDV Index:     38.31 ml/m2  LV % FS          30.6 %       LEFT ATRIUM:                   Normal Ranges:  LA Vol A4C:        38.2 ml    (22+/-6mL/m2)  LA Vol A2C:        41.6 ml  LA Vol BP:         40.3 ml  LA Vol Index A4C:  24.4 ml/m2  LA Vol Index A2C:  26.5 ml/m2  LA Vol Index BP:   25.7 ml/m2  LA Area A4C:       15.0 cm2  LA Area A2C:       15.8 cm2  LA Major Axis A4C: 5.0 cm  LA Major Axis A2C: 5.1 cm  LA Volume Index:   25.7 ml/m2  LA Vol A4C:        36.0 ml  LA Vol A2C:        39.5 ml  LA Vol Index BSA:  24.1 ml/m2       RIGHT ATRIUM:          Normal Ranges:  RA Area A4C:  12.1 cm2       AORTA MEASUREMENTS:         Normal Ranges:  Ao Sinus, d:        3.00 cm (2.1-3.5cm)  Ao STJ, d:          2.70 cm (1.7-3.4cm)  Asc Ao, d:          3.20 cm (2.1-3.4cm)       LV SYSTOLIC FUNCTION:                                         Normal Ranges:  EF-A4C View:                      66 % (>=55%)  EF-A2C View:                      77 %  EF-Biplane:                       72 %  EF-Visual:                        63 %  LV EF Reported:                   63 %  Global Longitudinal Strain (GLS): 19 %       LV DIASTOLIC FUNCTION:           Normal Ranges:  MV Peak E:             0.48 m/s  (0.7-1.2 m/s)  MV Peak A:             0.67 m/s  (0.42-0.7 m/s)  E/A Ratio:             0.73      (1.0-2.2)  MV e'                  0.052 m/s (>8.0)  MV lateral e'          0.06 m/s  MV medial e'           0.05 m/s  E/e' Ratio:            9.37      (<8.0)       MITRAL VALVE:          Normal Ranges:  MV DT:        274 msec (150-240msec)       AORTIC VALVE:                     Normal Ranges:  AoV Vmax:                1.16 m/s (<=1.7m/s)  AoV Peak P.4 mmHg (<20mmHg)  AoV Mean PG:             3.0 mmHg (1.7-11.5mmHg)  LVOT Max Wagner:            0.97 m/s (<=1.1m/s)  AoV VTI:                 22.56 cm (18-25cm)  LVOT VTI:                18.81 cm  LVOT Diameter:           1.69 cm  (1.8-2.4cm)  AoV Area, VTI:           1.88 cm2 (2.5-5.5cm2)  AoV Area,Vmax:           1.89 cm2 (2.5-4.5cm2)  AoV Dimensionless Index: 0.83        AORTIC INSUFFICIENCY:  AI Vmax:       3.66 m/s  AI Half-time:  474 msec  AI Decel Time: 1633 msec  AI Decel Rate: 239.49 cm/s2       RIGHT VENTRICLE:  RV Basal 2.70 cm  RV Mid   2.50 cm  RV Major 5.9 cm  TAPSE:   21.1 mm  RV s'    0.16 m/s       TRICUSPID VALVE/RVSP:          Normal Ranges:  Peak TR Velocity:     1.99 m/s  Est. RA Pressure:     3 mmHg  RV Syst Pressure:     19 mmHg  (< 30mmHg)  IVC Diam:             1.54 cm       AORTA:  Asc Ao Diam 3.21 cm       [id number] Jason Natalie DO  Electronically signed on 6/28/2025 at 12:55:05 PM       ** Final **      Physical Exam  Patient is awake and orient, not in apparent distress  Eyes: PERRLA, no conjunctival congestion  Chest: Bilateral Air entry, no crackles or wheezing  Heart: s1S2 regular, no murmur  Abdomen: Soft, non tender, BS present  Ext: Deformed left hand  Relevant Results               This patient currently has cardiac telemetry ordered; if you would like to modify or discontinue the telemetry order, click here to go to the orders activity to modify/discontinue the order.              Assessment & Plan    A-fib with RVR  On acebutolol for rate control as well as Eliquis for anticoagulation  Echocardiogram shows normal LV systolic function  Rhythm converted to NSR  Discharge planning to ECF for rehab-awaiting auth    Acute cystitis with hematuria  On IV Rocephin-D4  Follow cultures-growth indicates contamination    Hypertension  Continue current medication, monitor blood pressure and adjust as needed.    Physical debility/deconditioning  PT/OT     Parkinsonism  Not on any medication  Neurology follow-up as an outpatient    Victoriano Sprague MD PhD

## 2025-06-30 NOTE — TELEPHONE ENCOUNTER
----- Message from Rohit Villargan sent at 6/29/2025 11:18 PM EDT -----  Stable lab work result, cont current meds. Follow up as scheduled. -Dr. Villagran  ----- Message -----  From: [facility] Results In  Sent: 6/24/2025   9:21 AM EDT  To: Rohit Villagran MD

## 2025-06-30 NOTE — PROGRESS NOTES
Occupational Therapy    OT Treatment    Patient Name: Nadja Rodriguez  MRN: [id number]  Department: [address]  Room: 2015/2015-A  Today's Date: 6/30/2025  Time Calculation  Start Time: 1400  Stop Time: 1425  Time Calculation (min): 25 min        Assessment:Pt. Is MOD A for  sit to stand able to complete side stepping up toward HOB.  Barriers to Discharge Home: Caregiver assistance, Cognition needs, Physical needs  End of Session Communication: Bedside nurse  End of Session Patient Position: Bed, 3 rail up, Alarm on     Plan:  Treatment Interventions: ADL retraining, Functional transfer training, UE strengthening/ROM, Endurance training, Patient/family training, Equipment evaluation/education, Fine motor coordination activities, Compensatory technique education      Subjective   OT Visit Info:  OT Received On: 06/30/25  General Visit Info:  General  Prior to Session Communication: Bedside nurse  Patient Position Received: Bed, 3 rail up, Alarm on  General Comment: Reattempted this p.m. pt. agreeable MOD A to get to EOB, sit to stand MIN to MOD A pt. able to take side steps up toward HOB . Pt. standing tolerance up to 1 mins. Pt. tolerated EOB sitting up to 8 mins while engaging in UE ex.          Date/Time Vitals Session Patient Position Pulse Resp SpO2 BP MAP (mmHg)    06/30/25 1559 --  --  79  16  88 %  132/67  89     06/30/25 1612 --  --  --  --  90 %  --  --                 Pain:  Pain Assessment  Pain Assessment: 0-10  0-10 (Numeric) Pain Score:  (pt. reported pain at L shoulder when moving did not rate)    Objective    Cognition:  Cognition  Overall Cognitive Status: Within Functional Limits (anxious)  Coordination:     Activities of Daily Living: Feeding  Feeding Level of Assistance: Minimum assistance    Grooming  Grooming Level of Assistance: Minimum assistance  Grooming Where Assessed: Edge of bed       Functional Standing Tolerance:  Time: 1 min  Activity: side stepping toward HOB  Functional Standing  Tolerance Comments: pt. fatigued.  Bed Mobility/Transfers: Bed Mobility  Bed Mobility: Yes  Bed Mobility 1  Bed Mobility 1: Supine to sitting, Sitting to supine  Level of Assistance 1: Moderate assistance  Bed Mobility Comments 1: cues for hand placement    Transfers  Transfer: Yes  Transfer 1  Transfer From 1: Bed to  Transfer to 1: Stand  Technique 1: Sit to stand, Stand to sit  Transfer Device 1: Walker  Transfer Level of Assistance 1: Moderate assistance  Transfers 2  Transfer From 2: Stand to  Transfer to 2: Sit  Technique 2: Sit to stand  Transfer Device 2: Walker  Transfer Level of Assistance 2: Minimum assistance         Sitting Balance:  Static Sitting Balance  Static Sitting-Level of Assistance: Minimum assistance  Static Sitting-Comment/Number of Minutes: 5 mins            Outcome Measures:[facility] Daily Activity  Putting on and taking off regular lower body clothing: A lot  Bathing (including washing, rinsing, drying): A lot  Putting on and taking off regular upper body clothing: A lot  Toileting, which includes using toilet, bedpan or urinal: A lot  Taking care of personal grooming such as brushing teeth: A little  Eating Meals: A little  Daily Activity - Total Score: 14        Education Documentation  Body Mechanics, taught by BERE Gore at 6/30/2025  3:27 PM.  Learner: Patient  Readiness: Acceptance  Method: Explanation  Response: Verbalizes Understanding, Needs Reinforcement    Precautions, taught by BERE Gore at 6/30/2025  3:27 PM.  Learner: Patient  Readiness: Acceptance  Method: Explanation  Response: Verbalizes Understanding, Needs Reinforcement    Education Comments  No comments found.        OP EDUCATION:       Goals:  Encounter Problems       Encounter Problems (Active)       ADLs       Patient will complete toileting including hygiene clothing management/hygiene with stand by assist level of assistance. (Not Progressing)       Start:  06/28/25    Expected End:   07/12/25               MOBILITY       Patient will perform Functional mobility min Household distances/Community Distances with stand by assist level of assistance and least restrictive device in order to improve safety and functional mobility. (Progressing)       Start:  06/28/25    Expected End:  07/12/25               TRANSFERS       Patient will perform bed mobility stand by assist level of assistance in order to improve safety and independence with mobility (Progressing)       Start:  06/28/25    Expected End:  07/12/25            Patient will complete functional transfers with least restrictive device with stand by assist level of assistance. (Progressing)       Start:  06/28/25    Expected End:  07/12/25

## 2025-06-30 NOTE — PROGRESS NOTES
"Physical Therapy                 Therapy Communication Note    Patient Name: Nadja Rodriguez  MRN: [id number]  Department: [facility] 2 W  Room: 2015/2015-A  Today's Date: 6/30/2025     Discipline: Physical Therapy    Missed Visit: PT Missed Visit: Yes     Missed Visit Reason:   PT made x2 attempts to see pt today.  1st attempt pt requested therapist check back in afternoon.  2nd attempt pt declined, states \"I'd like to not be bothered.\"    Missed Time: Attempt          "

## 2025-06-30 NOTE — PROGRESS NOTES
Occupational Therapy                 Therapy Communication Note    Patient Name: Nadja Rodriguez  MRN: [id number]  Department: [facility] 2 W  Room: 2015/2015-A  Today's Date: 6/30/2025     Discipline: Occupational Therapy    Missed Visit: OT Missed Visit: Yes     Missed Visit Reason: Missed Visit Reason: Patient refused (pt. eating reports she would like to not be bothered has had people in her room all morning. Will reattempt later if time permits)    Missed Time: Attempt    Comment:

## 2025-07-01 LAB
ANION GAP SERPL CALC-SCNC: 10 MMOL/L (ref 10–20)
BUN SERPL-MCNC: 16 MG/DL (ref 6–23)
CALCIUM SERPL-MCNC: 8.4 MG/DL (ref 8.6–10.3)
CHLORIDE SERPL-SCNC: 104 MMOL/L (ref 98–107)
CO2 SERPL-SCNC: 26 MMOL/L (ref 21–32)
CREAT SERPL-MCNC: 0.42 MG/DL (ref 0.5–1.05)
EGFRCR SERPLBLD CKD-EPI 2021: >90 ML/MIN/1.73M*2
GLUCOSE SERPL-MCNC: 100 MG/DL (ref 74–99)
MAGNESIUM SERPL-MCNC: 1.99 MG/DL (ref 1.6–2.4)
POTASSIUM SERPL-SCNC: 4.1 MMOL/L (ref 3.5–5.3)
SODIUM SERPL-SCNC: 136 MMOL/L (ref 136–145)

## 2025-07-01 PROCEDURE — 2060000001 HC INTERMEDIATE ICU ROOM DAILY

## 2025-07-01 PROCEDURE — 80048 BASIC METABOLIC PNL TOTAL CA: CPT | Performed by: INTERNAL MEDICINE

## 2025-07-01 PROCEDURE — 36415 COLL VENOUS BLD VENIPUNCTURE: CPT | Performed by: INTERNAL MEDICINE

## 2025-07-01 PROCEDURE — 2500000001 HC RX 250 WO HCPCS SELF ADMINISTERED DRUGS (ALT 637 FOR MEDICARE OP): Performed by: INTERNAL MEDICINE

## 2025-07-01 PROCEDURE — 2500000005 HC RX 250 GENERAL PHARMACY W/O HCPCS: Performed by: STUDENT IN AN ORGANIZED HEALTH CARE EDUCATION/TRAINING PROGRAM

## 2025-07-01 PROCEDURE — 2500000001 HC RX 250 WO HCPCS SELF ADMINISTERED DRUGS (ALT 637 FOR MEDICARE OP): Performed by: STUDENT IN AN ORGANIZED HEALTH CARE EDUCATION/TRAINING PROGRAM

## 2025-07-01 PROCEDURE — 2500000002 HC RX 250 W HCPCS SELF ADMINISTERED DRUGS (ALT 637 FOR MEDICARE OP, ALT 636 FOR OP/ED): Performed by: STUDENT IN AN ORGANIZED HEALTH CARE EDUCATION/TRAINING PROGRAM

## 2025-07-01 PROCEDURE — 97530 THERAPEUTIC ACTIVITIES: CPT | Mod: GP,CQ

## 2025-07-01 PROCEDURE — 83735 ASSAY OF MAGNESIUM: CPT | Performed by: INTERNAL MEDICINE

## 2025-07-01 PROCEDURE — 97530 THERAPEUTIC ACTIVITIES: CPT | Mod: GO,CO

## 2025-07-01 PROCEDURE — 99233 SBSQ HOSP IP/OBS HIGH 50: CPT | Performed by: INTERNAL MEDICINE

## 2025-07-01 PROCEDURE — 2500000004 HC RX 250 GENERAL PHARMACY W/ HCPCS (ALT 636 FOR OP/ED): Performed by: STUDENT IN AN ORGANIZED HEALTH CARE EDUCATION/TRAINING PROGRAM

## 2025-07-01 PROCEDURE — 97535 SELF CARE MNGMENT TRAINING: CPT | Mod: GO,CO

## 2025-07-01 PROCEDURE — 97110 THERAPEUTIC EXERCISES: CPT | Mod: GP,CQ

## 2025-07-01 RX ADMIN — APIXABAN 5 MG: 5 TABLET, FILM COATED ORAL at 09:10

## 2025-07-01 RX ADMIN — POTASSIUM CHLORIDE EXTENDED-RELEASE 20 MEQ: 1500 TABLET ORAL at 09:10

## 2025-07-01 RX ADMIN — FLUTICASONE PROPIONATE 1 SPRAY: 50 SPRAY, METERED NASAL at 09:23

## 2025-07-01 RX ADMIN — OXYCODONE HYDROCHLORIDE 5 MG: 5 TABLET ORAL at 09:10

## 2025-07-01 RX ADMIN — Medication 3 MG: at 20:17

## 2025-07-01 RX ADMIN — APIXABAN 5 MG: 5 TABLET, FILM COATED ORAL at 20:17

## 2025-07-01 RX ADMIN — ACEBUTOLOL HYDROCHLORIDE 200 MG: 200 CAPSULE ORAL at 09:10

## 2025-07-01 RX ADMIN — PANTOPRAZOLE SODIUM 40 MG: 40 INJECTION, POWDER, FOR SOLUTION INTRAVENOUS at 06:20

## 2025-07-01 RX ADMIN — OXYCODONE HYDROCHLORIDE 5 MG: 5 TABLET ORAL at 20:17

## 2025-07-01 RX ADMIN — CEFTRIAXONE 1 G: 1 INJECTION, SOLUTION INTRAVENOUS at 20:17

## 2025-07-01 ASSESSMENT — COGNITIVE AND FUNCTIONAL STATUS - GENERAL
MOBILITY SCORE: 12
MOVING FROM LYING ON BACK TO SITTING ON SIDE OF FLAT BED WITH BEDRAILS: A LITTLE
HELP NEEDED FOR BATHING: A LOT
CLIMB 3 TO 5 STEPS WITH RAILING: TOTAL
STANDING UP FROM CHAIR USING ARMS: A LOT
TURNING FROM BACK TO SIDE WHILE IN FLAT BAD: A LOT
DRESSING REGULAR LOWER BODY CLOTHING: A LOT
DRESSING REGULAR UPPER BODY CLOTHING: A LITTLE
MOVING TO AND FROM BED TO CHAIR: A LOT
PERSONAL GROOMING: A LITTLE
WALKING IN HOSPITAL ROOM: A LOT
EATING MEALS: A LITTLE
DAILY ACTIVITIY SCORE: 15
TOILETING: A LOT

## 2025-07-01 ASSESSMENT — PAIN SCALES - GENERAL
PAINLEVEL_OUTOF10: 0 - NO PAIN
PAINLEVEL_OUTOF10: 4

## 2025-07-01 ASSESSMENT — PAIN - FUNCTIONAL ASSESSMENT: PAIN_FUNCTIONAL_ASSESSMENT: 0-10

## 2025-07-01 ASSESSMENT — ACTIVITIES OF DAILY LIVING (ADL): HOME_MANAGEMENT_TIME_ENTRY: 23

## 2025-07-01 NOTE — PROGRESS NOTES
07/01/25 0809   Discharge Planning   Living Arrangements Alone   Support Systems Family members;Children   Assistance Needed ADL's/ IADL's   Type of Residence Private residence   Home or Post Acute Services Post acute facilities (Rehab/SNF/etc)   Type of Post Acute Facility Services Skilled nursing   Expected Discharge Disposition SNF   Does the patient need discharge transport arranged? Yes   [place] [facility] coordination needed? Yes   Patient Choice   Provider Choice list and CMS website (https://medicare.gov/care-compare#search) for post-acute Quality and Resource Measure Data were provided and reviewed with: Patient;Family   Patient / Family choosing to utilize agency / facility established prior to hospitalization No   Stroke Family Assessment   Stroke Family Assessment Needed No   Intensity of Service   Intensity of Service >30 min     Pt lives at home alone. Pt confirms PCP as [facility]. Pt states that her son and DNL help her with groceries and appointments. Pt states that she has been getting weaker and falling a lot. Pt states that she would like to go to a nursing facility because she is to weak to care for her self. Pt recommended for SNF. Pt agreeable for TCC to discuss facility choices with son and DNL. Patient was provided with a [employer] list of skilled SNF agencies that are in network with patient's insurance payor, service patient's preferred geographic region, and that displays CMS star ratings. Pt states that she would like her family to review the list when they come in today. TCC following.     1545: TCC discussed SNF placement with pt and family at bedside. TCC provided new SNF [employer] list for locations closer to pt's son. TCC explained placement, authorization, and transport to family. Pt's family requested time to review list and provide choices. TCC answered all questions and concerns at this time. TCC following.

## 2025-07-01 NOTE — NURSING NOTE
End of Shift Report  7/1/25     Admission  Nadja Rodriguez was admitted on 6/27/2025 for the following diagnoses:   Fall, initial encounter [W19.XXXA]  Urinary tract infection with hematuria, site unspecified [N39.0, R31.9]       Recent Vital Signs  Patient Vitals for the past 12 hrs:   BP Temp Temp src Pulse Resp SpO2   07/01/25 0808 130/78 36.4 °C (97.6 °F) Temporal 84 16 90 %   07/01/25 1134 114/73 36.4 °C (97.6 °F) Temporal 78 18 92 %   07/01/25 1520 129/83 36.6 °C (97.8 °F) Temporal 72 16 94 %          Latest labs  Lab Results   Component Value Date    WBC 8.7 06/29/2025    HGB 13.5 06/29/2025    HCT 40.1 06/29/2025     06/29/2025    CHOL 172 07/10/2023    TRIG 184 (H) 07/10/2023    HDL 30.0 (A) 07/10/2023    ALT 24 06/27/2025    AST 41 (H) 06/27/2025     07/01/2025    K 4.1 07/01/2025     07/01/2025    CREATININE 0.42 (L) 07/01/2025    BUN 16 07/01/2025    CO2 26 07/01/2025    TSH 2.84 01/30/2024    INR 1.1 06/27/2025

## 2025-07-01 NOTE — PROGRESS NOTES
Occupational Therapy    OT Treatment    Patient Name: Nadja Rodriguez  MRN: [id number]  Department: [address]  Room: 2015/2015-A  Today's Date: 7/1/2025  Time Calculation  Start Time: 0840  Stop Time: 0920  Time Calculation (min): 40 min        Assessment:  Barriers to Discharge Home: Caregiver assistance  End of Session Communication: Bedside nurse  End of Session Patient Position: Alarm on, Up in chair      Plan:  Treatment Interventions: ADL retraining, Functional transfer training, UE strengthening/ROM, Endurance training, Patient/family training, Equipment evaluation/education, Fine motor coordination activities, Compensatory technique education  OT Frequency: 4 times per week  OT Discharge Recommendations: Moderate intensity level of continued care  Equipment Recommended upon Discharge:  (TBD)  OT Recommended Transfer Status: Assist of 2  OT - OK to Discharge: Yes  Treatment Interventions: ADL retraining, Functional transfer training, UE strengthening/ROM, Endurance training, Patient/family training, Equipment evaluation/education, Fine motor coordination activities, Compensatory technique education    Subjective   OT Visit Info:  OT Received On: 07/01/25  General Visit Info:  General  Prior to Session Communication: Bedside nurse  Patient Position Received: Bed, 3 rail up, Alarm on  General Comment: pt. cooperative this date increased transfers with use of kaleb walker MOD A x2 to chair.           Pain:  Pain Assessment  Pain Assessment: 0-10  0-10 (Numeric) Pain Score:  (pt. reported pain at L shoulder when moving did not rate)    Objective    Cognition:  Cognition  Overall Cognitive Status: Within Functional Limits      Activities of Daily Living: Feeding  Feeding Level of Assistance: Minimum assistance    Grooming  Grooming Level of Assistance: Setup  Grooming Where Assessed: Edge of bed  Grooming Comments: brush hair    UE Bathing  UE Bathing Level of Assistance: Moderate assistance  UE Bathing Where Assessed:   (chair)         UE Dressing  UE Dressing Level of Assistance: Minimum assistance  UE Dressing Where Assessed: Chair  UE Dressing Comments: gown    LE Dressing  LE Dressing: Yes  Sock Level of Assistance: Minimum assistance  Adult Briefs Level of Assistance: Moderate assistance  LE Dressing Where Assessed: Chair  LE Dressing Comments: increased time    Toileting  Toileting Level of Assistance: Moderate assistance  Where Assessed:  (from EOB)    Bed Mobility/Transfers: Bed Mobility  Bed Mobility: Yes  Bed Mobility 1  Bed Mobility 1: Supine to sitting  Level of Assistance 1: Minimum assistance, +2, Minimal verbal cues  Bed Mobility Comments 1: cues for use of hand rail.    Transfers  Transfer: Yes  Transfer 1  Transfer From 1: Bed to  Transfer to 1: Chair with arms  Technique 1: Sit to stand, Stand to sit  Transfer Device 1: Cuong-walker  Transfer Level of Assistance 1: Moderate assistance, +2, Moderate verbal cues  Trials/Comments 1: pt. needed assist with advancing foot and walker  Transfers 2  Transfer From 2: Chair with arms to  Transfer to 2: Stand  Technique 2: Sit to stand, Stand to sit  Transfer Device 2: Cuong-walker  Transfer Level of Assistance 2: Minimum assistance, +2, Moderate verbal cues  Trials/Comments 2: pt. was heavily leaning on chair cues for reaching back on chair       Sitting Balance:  Static Sitting Balance  Static Sitting-Level of Assistance: Minimum assistance  Static Sitting-Comment/Number of Minutes: 5 mins  ctivity:               Outcome Measures:[facility] Daily Activity  Putting on and taking off regular lower body clothing: A lot  Bathing (including washing, rinsing, drying): A lot  Putting on and taking off regular upper body clothing: A little  Toileting, which includes using toilet, bedpan or urinal: A lot  Taking care of personal grooming such as brushing teeth: A little  Eating Meals: A little  Daily Activity - Total Score: 15        Education Documentation  Body Mechanics, taught by  BERE Gore at 7/1/2025  9:59 AM.  Learner: Patient  Readiness: Acceptance  Method: Explanation  Response: Verbalizes Understanding, Needs Reinforcement    Precautions, taught by BERE Gore at 7/1/2025  9:59 AM.  Learner: Patient  Readiness: Acceptance  Method: Explanation  Response: Verbalizes Understanding, Needs Reinforcement    Body Mechanics, taught by BERE Gore at 6/30/2025  3:27 PM.  Learner: Patient  Readiness: Acceptance  Method: Explanation  Response: Verbalizes Understanding, Needs Reinforcement    Precautions, taught by BERE Gore at 6/30/2025  3:27 PM.  Learner: Patient  Readiness: Acceptance  Method: Explanation  Response: Verbalizes Understanding, Needs Reinforcement    Education Comments  No comments found.        OP EDUCATION:       Goals:  Encounter Problems       Encounter Problems (Active)       ADLs       Patient will complete toileting including hygiene clothing management/hygiene with stand by assist level of assistance. (Not Progressing)       Start:  06/28/25    Expected End:  07/12/25               MOBILITY       Patient will perform Functional mobility min Household distances/Community Distances with stand by assist level of assistance and least restrictive device in order to improve safety and functional mobility. (Progressing)       Start:  06/28/25    Expected End:  07/12/25               TRANSFERS       Patient will perform bed mobility stand by assist level of assistance in order to improve safety and independence with mobility (Progressing)       Start:  06/28/25    Expected End:  07/12/25            Patient will complete functional transfers with least restrictive device with stand by assist level of assistance. (Progressing)       Start:  06/28/25    Expected End:  07/12/25

## 2025-07-01 NOTE — PROGRESS NOTES
Social work consult placed for discharge planning. SW reviewed pt's chart and communicated with TCC. No SW needs foreseen at this time. SW signing off; available upon request.    DAV Daniels ([id number])   Care Transitions

## 2025-07-01 NOTE — CARE PLAN
The patient's goals for the shift include      The clinical goals for the shift include patient terence be free from falls and injury      Problem: Skin  Goal: Decreased wound size/increased tissue granulation at next dressing change  Outcome: Progressing  Flowsheets (Taken 6/28/2025 0325 by Marleen Peng, RN)  Decreased wound size/increased tissue granulation at next dressing change: Protective dressings over bony prominences  Goal: Participates in plan/prevention/treatment measures  Outcome: Progressing  Flowsheets (Taken 6/28/2025 0325 by Marleen Peng RN)  Participates in plan/prevention/treatment measures: Discuss with provider PT/OT consult  Goal: Prevent/manage excess moisture  Outcome: Progressing  Flowsheets (Taken 7/1/2025 0145)  Prevent/manage excess moisture: Monitor for/manage infection if present  Goal: Prevent/minimize sheer/friction injuries  Outcome: Progressing  Flowsheets (Taken 7/1/2025 0145)  Prevent/minimize sheer/friction injuries: Use pull sheet  Goal: Promote/optimize nutrition  Outcome: Progressing  Flowsheets (Taken 7/1/2025 0145)  Promote/optimize nutrition: Assist with feeding  Goal: Promote skin healing  Outcome: Progressing  Flowsheets (Taken 7/1/2025 0145)  Promote skin healing: Turn/reposition every 2 hours/use positioning/transfer devices

## 2025-07-01 NOTE — PROGRESS NOTES
Enio Herron is a 89 y.o. female on day 4 of admission presenting with Urinary tract infection with hematuria, site unspecified.      Subjective   Enio Herron is a 89 y.o. female with PMHx s/f HTN, hypokalemia, osteoporosis, palpitations (presumed afib), Parkinsons presenting with weakness and fall.  Initial workup in ER shows tachycardia as well as elevated blood pressure, mild leukocytosis with WBC of 12, UA suggestive of UTI.  She was admitted with a diagnosis of new onset of A-fib with RVR as well as UTI leading to weakness and fall.  Status post Cardizem IV push in ER, started on IV Rocephin, Lovenox at therapeutic dose, IV fluid.  Echocardiogram ordered and cardiology consulted.  Evaluated by cardiology and recommendation appreciated.  Rhythm converted to NSR, anticoagulation switched to oral Eliquis.  [facility] planning to [facility]-awaiting acceptance.    6/30: She was struggling with chronic L hand pain this AM. 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    7/1: Similar to yesterday.     Objective     Last Recorded Vitals  /73 (BP Location: Right arm, Patient Position: Lying)   Pulse 78   Temp 36.4 °C (97.6 °F) (Temporal)   Resp 18   Wt 58.6 kg (129 lb 3 oz)   SpO2 92%   Intake/Output last 3 Shifts:    Intake/Output Summary (Last 24 hours) at 7/1/2025 1301  Last data filed at 7/1/2025 1236  Gross per 24 hour   Intake 791 ml   Output 1100 ml   Net -309 ml       Admission Weight  Weight: 59 kg (130 lb) (06/27/25 1405)    Daily Weight  07/01/25 : 58.6 kg (129 lb 3 oz)    Image Results  Transthoracic Echo Complete                [place] [id number]       Phone [phone] Fax [phone]    TRANSTHORACIC ECHOCARDIOGRAM REPORT    Patient Name:       ENIO HERRON      Reading Physician:    [id number] Jason Estrada DO  Study Date:         6/28/2025             Ordering Provider:    [id number] LU FARAH  MRN/PID:            [id number]             Fellow:  Accession#:         [id number]         Nurse:  Date of Birth/Age:  1935 / 89 years Sonographer:          Joy Alvarez RDCS  Gender Assigned at  F                    Additional Staff:  Birth:  Height:             152.40 cm            Admit Date:           6/27/2025  Weight:             60.33 kg             Admission Status:     Inpatient -                                                                 Routine  BSA / BMI:          1.57 m2 / 25.97      Department Location:  [facility]                      kg/m2  Blood Pressure: 100 /64 mmHg    Study Type:    TRANSTHORACIC ECHO (TTE) COMPLETE  Diagnosis/ICD: Paroxysmal atrial fibrillation-I48.0  Indication:    Atrial Fibrillation  CPT Codes:     Echo Complete w Full [id number]    Patient History:  Pertinent History: HTN, AFIB, MVP.    Study Detail: The following Echo studies were performed: 2D, M-Mode, Doppler,                color flow and Strain. A bubble study was not performed.       PHYSICIAN INTERPRETATION:  Left Ventricle: The left ventricular systolic function is normal with a visually estimated ejection fraction of 60-65%. There is left ventricular hypertrophy involving the septal wall, with an asymmetric distribution. There are no regional wall motion abnormalities. The left ventricular cavity size is normal. There is normal septal and normal posterior left ventricular wall thickness. There is left ventricular hypertrophy involving the septal wall. There is left ventricular concentric remodeling. Left Ventricular Global Longitudinal Strain - 19.0 %. Spectral Doppler shows a Grade I (impaired relaxation pattern) of left ventricular diastolic filling with normal left atrial filling pressure.  Left Atrium: The left atrial size is normal. A bubble study using agitated saline was not  performed.  Right Ventricle: The right ventricle is normal in size. There is normal right ventricular global systolic function.  Right Atrium: The right atrial size is normal.  Aortic Valve: The aortic valve is trileaflet. The aortic valve area by VTI is 1.88 cmï¿½ with a peak velocity of 1.16 m/s. The peak and mean gradients are 5 mmHg and 3 mmHg, respectively, with a dimensionless index of 0.83. There is mild aortic valve regurgitation.  Mitral Valve: The mitral valve is normal in structure. There is trace mitral valve regurgitation. The E Vmax is 0.48 m/s.  Tricuspid Valve: The tricuspid valve is structurally normal. There is trace tricuspid regurgitation. The Doppler estimated right ventricular systolic pressure (RVSP) is within normal limits at 19 mmHg.  Pulmonic Valve: The pulmonic valve is structurally normal. There is trace pulmonic valve regurgitation.  Pericardium: No pericardial effusion noted. There is a pericardial fat pad present.  Aorta: The aortic root is normal.  Systemic Veins: The inferior vena cava appears normal in size, with IVC inspiratory collapse greater than 50%.       CONCLUSIONS:   1. The left ventricular systolic function is normal with a visually estimated ejection fraction of 60-65%.   2. Spectral Doppler shows a Grade I (impaired relaxation pattern) of left ventricular diastolic filling with normal left atrial filling pressure.   3. There is normal right ventricular global systolic function.   4. The Doppler estimated RVSP is within normal limits at 19 mmHg.   5. Mild aortic valve regurgitation.   6. Left Ventricular Global Longitudinal Strain - 19.0 %.    QUANTITATIVE DATA SUMMARY:     2D MEASUREMENTS:             Normal Ranges:  LAs:             3.48 cm     (2.7-4.0cm)  IVSd:            0.82 cm     (0.6-1.1cm)  LVPWd:           0.94 cm     (0.6-1.1cm)  LVIDd:           4.30 cm     (3.9-5.9cm)  LVIDs:           2.98 cm  LV Mass Index:   76.1 g/m2  LVEDV Index:     38.31 ml/m2  LV %  FS          30.6 %       LEFT ATRIUM:                  Normal Ranges:  LA Vol A4C:        38.2 ml    (22+/-6mL/m2)  LA Vol A2C:        41.6 ml  LA Vol BP:         40.3 ml  LA Vol Index A4C:  24.4 ml/m2  LA Vol Index A2C:  26.5 ml/m2  LA Vol Index BP:   25.7 ml/m2  LA Area A4C:       15.0 cm2  LA Area A2C:       15.8 cm2  LA Major Axis A4C: 5.0 cm  LA Major Axis A2C: 5.1 cm  LA Volume Index:   25.7 ml/m2  LA Vol A4C:        36.0 ml  LA Vol A2C:        39.5 ml  LA Vol Index BSA:  24.1 ml/m2       RIGHT ATRIUM:          Normal Ranges:  RA Area A4C:  12.1 cm2       AORTA MEASUREMENTS:         Normal Ranges:  Ao Sinus, d:        3.00 cm (2.1-3.5cm)  Ao STJ, d:          2.70 cm (1.7-3.4cm)  Asc Ao, d:          3.20 cm (2.1-3.4cm)       LV SYSTOLIC FUNCTION:                                         Normal Ranges:  EF-A4C View:                      66 % (>=55%)  EF-A2C View:                      77 %  EF-Biplane:                       72 %  EF-Visual:                        63 %  LV EF Reported:                   63 %  Global Longitudinal Strain (GLS): 19 %       LV DIASTOLIC FUNCTION:           Normal Ranges:  MV Peak E:             0.48 m/s  (0.7-1.2 m/s)  MV Peak A:             0.67 m/s  (0.42-0.7 m/s)  E/A Ratio:             0.73      (1.0-2.2)  MV e'                  0.052 m/s (>8.0)  MV lateral e'          0.06 m/s  MV medial e'           0.05 m/s  E/e' Ratio:            9.37      (<8.0)       MITRAL VALVE:          Normal Ranges:  MV DT:        274 msec (150-240msec)       AORTIC VALVE:                     Normal Ranges:  AoV Vmax:                1.16 m/s (<=1.7m/s)  AoV Peak P.4 mmHg (<20mmHg)  AoV Mean PG:             3.0 mmHg (1.7-11.5mmHg)  LVOT Max Wagner:            0.97 m/s (<=1.1m/s)  AoV VTI:                 22.56 cm (18-25cm)  LVOT VTI:                18.81 cm  LVOT Diameter:           1.69 cm  (1.8-2.4cm)  AoV Area, VTI:           1.88 cm2 (2.5-5.5cm2)  AoV Area,Vmax:           1.89 cm2  (2.5-4.5cm2)  AoV Dimensionless Index: 0.83       AORTIC INSUFFICIENCY:  AI Vmax:       3.66 m/s  AI Half-time:  474 msec  AI Decel Time: 1633 msec  AI Decel Rate: 239.49 cm/s2       RIGHT VENTRICLE:  RV Basal 2.70 cm  RV Mid   2.50 cm  RV Major 5.9 cm  TAPSE:   21.1 mm  RV s'    0.16 m/s       TRICUSPID VALVE/RVSP:          Normal Ranges:  Peak TR Velocity:     1.99 m/s  Est. RA Pressure:     3 mmHg  RV Syst Pressure:     19 mmHg  (< 30mmHg)  IVC Diam:             1.54 cm       AORTA:  Asc Ao Diam 3.21 cm       [id number] Jason Natalie DO  Electronically signed on 6/28/2025 at 12:55:05 PM       ** Final **      Physical Exam  Patient is awake and orient, not in apparent distress  Eyes: PERRLA, no conjunctival congestion  Chest: Bilateral Air entry, no crackles or wheezing  Heart: s1S2 regular, no murmur  Abdomen: Soft, non tender, BS present  Ext: Deformed left hand  Relevant Results              Assessment & Plan    A-fib with RVR  On acebutolol for rate control as well as Eliquis for anticoagulation  Echocardiogram shows normal LV systolic function  Rhythm converted to NSR  Discharge planning to [facility] for rehab-awaiting auth    Acute cystitis with hematuria  On IV Rocephin-D5  Follow cultures-growth indicates contamination    Hypertension  Continue current medication, monitor blood pressure and adjust as needed.    Physical debility/deconditioning  PT/OT rec SNF, her son is working on a choice     Parkinsonism  Not on any medication  Neurology follow-up as an outpatient    Victoriano Sprague MD PhD

## 2025-07-02 LAB
ANION GAP SERPL CALC-SCNC: 10 MMOL/L (ref 10–20)
BUN SERPL-MCNC: 15 MG/DL (ref 6–23)
CALCIUM SERPL-MCNC: 8.6 MG/DL (ref 8.6–10.3)
CHLORIDE SERPL-SCNC: 104 MMOL/L (ref 98–107)
CO2 SERPL-SCNC: 26 MMOL/L (ref 21–32)
CREAT SERPL-MCNC: 0.48 MG/DL (ref 0.5–1.05)
EGFRCR SERPLBLD CKD-EPI 2021: >90 ML/MIN/1.73M*2
GLUCOSE SERPL-MCNC: 103 MG/DL (ref 74–99)
MAGNESIUM SERPL-MCNC: 2.13 MG/DL (ref 1.6–2.4)
POTASSIUM SERPL-SCNC: 4.3 MMOL/L (ref 3.5–5.3)
SODIUM SERPL-SCNC: 136 MMOL/L (ref 136–145)

## 2025-07-02 PROCEDURE — 2500000001 HC RX 250 WO HCPCS SELF ADMINISTERED DRUGS (ALT 637 FOR MEDICARE OP): Performed by: INTERNAL MEDICINE

## 2025-07-02 PROCEDURE — 83735 ASSAY OF MAGNESIUM: CPT | Performed by: INTERNAL MEDICINE

## 2025-07-02 PROCEDURE — 2500000001 HC RX 250 WO HCPCS SELF ADMINISTERED DRUGS (ALT 637 FOR MEDICARE OP): Performed by: STUDENT IN AN ORGANIZED HEALTH CARE EDUCATION/TRAINING PROGRAM

## 2025-07-02 PROCEDURE — 2060000001 HC INTERMEDIATE ICU ROOM DAILY

## 2025-07-02 PROCEDURE — 2500000002 HC RX 250 W HCPCS SELF ADMINISTERED DRUGS (ALT 637 FOR MEDICARE OP, ALT 636 FOR OP/ED): Performed by: STUDENT IN AN ORGANIZED HEALTH CARE EDUCATION/TRAINING PROGRAM

## 2025-07-02 PROCEDURE — 99233 SBSQ HOSP IP/OBS HIGH 50: CPT | Performed by: INTERNAL MEDICINE

## 2025-07-02 PROCEDURE — 2500000005 HC RX 250 GENERAL PHARMACY W/O HCPCS: Performed by: STUDENT IN AN ORGANIZED HEALTH CARE EDUCATION/TRAINING PROGRAM

## 2025-07-02 PROCEDURE — 82374 ASSAY BLOOD CARBON DIOXIDE: CPT | Performed by: INTERNAL MEDICINE

## 2025-07-02 PROCEDURE — 97530 THERAPEUTIC ACTIVITIES: CPT | Mod: GO,CO

## 2025-07-02 PROCEDURE — 97535 SELF CARE MNGMENT TRAINING: CPT | Mod: GO,CO

## 2025-07-02 PROCEDURE — 36415 COLL VENOUS BLD VENIPUNCTURE: CPT | Performed by: INTERNAL MEDICINE

## 2025-07-02 RX ADMIN — APIXABAN 5 MG: 5 TABLET, FILM COATED ORAL at 20:53

## 2025-07-02 RX ADMIN — PANTOPRAZOLE SODIUM 40 MG: 40 TABLET, DELAYED RELEASE ORAL at 06:39

## 2025-07-02 RX ADMIN — POTASSIUM CHLORIDE EXTENDED-RELEASE 20 MEQ: 1500 TABLET ORAL at 10:07

## 2025-07-02 RX ADMIN — OXYCODONE HYDROCHLORIDE 5 MG: 5 TABLET ORAL at 20:53

## 2025-07-02 RX ADMIN — APIXABAN 5 MG: 5 TABLET, FILM COATED ORAL at 10:08

## 2025-07-02 RX ADMIN — ACEBUTOLOL HYDROCHLORIDE 200 MG: 200 CAPSULE ORAL at 10:08

## 2025-07-02 RX ADMIN — Medication 3 MG: at 20:53

## 2025-07-02 RX ADMIN — FLUTICASONE PROPIONATE 1 SPRAY: 50 SPRAY, METERED NASAL at 10:11

## 2025-07-02 ASSESSMENT — COGNITIVE AND FUNCTIONAL STATUS - GENERAL
DRESSING REGULAR LOWER BODY CLOTHING: A LOT
TOILETING: A LITTLE
STANDING UP FROM CHAIR USING ARMS: A LITTLE
EATING MEALS: A LITTLE
MOBILITY SCORE: 18
PERSONAL GROOMING: A LITTLE
EATING MEALS: A LITTLE
DAILY ACTIVITIY SCORE: 18
WALKING IN HOSPITAL ROOM: A LITTLE
DRESSING REGULAR UPPER BODY CLOTHING: A LOT
DRESSING REGULAR LOWER BODY CLOTHING: A LITTLE
MOVING FROM LYING ON BACK TO SITTING ON SIDE OF FLAT BED WITH BEDRAILS: A LITTLE
TURNING FROM BACK TO SIDE WHILE IN FLAT BAD: A LITTLE
PERSONAL GROOMING: A LITTLE
HELP NEEDED FOR BATHING: A LITTLE
DAILY ACTIVITIY SCORE: 14
DRESSING REGULAR UPPER BODY CLOTHING: A LITTLE
TOILETING: A LOT
MOVING TO AND FROM BED TO CHAIR: A LITTLE
HELP NEEDED FOR BATHING: A LOT
CLIMB 3 TO 5 STEPS WITH RAILING: A LITTLE

## 2025-07-02 ASSESSMENT — PAIN - FUNCTIONAL ASSESSMENT
PAIN_FUNCTIONAL_ASSESSMENT: 0-10

## 2025-07-02 ASSESSMENT — PAIN SCALES - GENERAL
PAINLEVEL_OUTOF10: 0 - NO PAIN
PAINLEVEL_OUTOF10: 7

## 2025-07-02 ASSESSMENT — ACTIVITIES OF DAILY LIVING (ADL): HOME_MANAGEMENT_TIME_ENTRY: 13

## 2025-07-02 NOTE — PROGRESS NOTES
Enio Herron is a 89 y.o. female on day 5 of admission presenting with Urinary tract infection with hematuria, site unspecified.      Subjective   Enio Herron is a 89 y.o. female with PMHx s/f HTN, hypokalemia, osteoporosis, palpitations (presumed afib), Parkinsons presenting with weakness and fall.  Initial workup in ER shows tachycardia as well as elevated blood pressure, mild leukocytosis with WBC of 12, UA suggestive of UTI.  She was admitted with a diagnosis of new onset of A-fib with RVR as well as UTI leading to weakness and fall.  Status post Cardizem IV push in ER, started on IV Rocephin, Lovenox at therapeutic dose, IV fluid.  Echocardiogram ordered and cardiology consulted.  Evaluated by cardiology and recommendation appreciated.  Rhythm converted to NSR, anticoagulation switched to oral Eliquis.  [facility] planning to [facility]-awaiting acceptance.    6/30: She was struggling with chronic L hand pain this AM. 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    7/1: Similar to yesterday.     7/2: Essentially unchanged.     Objective     Last Recorded Vitals  /78 (BP Location: Right arm, Patient Position: Lying)   Pulse 70   Temp 37 °C (98.6 °F) (Temporal)   Resp 16   Wt 64.4 kg (141 lb 15.6 oz)   SpO2 93%   Intake/Output last 3 Shifts:    Intake/Output Summary (Last 24 hours) at 7/2/2025 1212  Last data filed at 7/1/2025 1854  Gross per 24 hour   Intake 200 ml   Output 100 ml   Net 100 ml       Admission Weight  Weight: 59 kg (130 lb) (06/27/25 1405)    Daily Weight  07/02/25 : 64.4 kg (141 lb 15.6 oz)    Image Results  Transthoracic Echo Complete                [place] [id number]       Phone [phone] Fax [phone]    TRANSTHORACIC ECHOCARDIOGRAM REPORT    Patient Name:       ENIO HERRON      Reading Physician:    [id number] Jason Estrada                                                                  DO  Study Date:         6/28/2025            Ordering Provider:    [id number] LU FARAH  MRN/PID:            [id number]             Fellow:  Accession#:         [id number]         Nurse:  Date of Birth/Age:  1935 / 89 years Sonographer:          Joy Alvarez RDCS  Gender Assigned at  F                    Additional Staff:  Birth:  Height:             152.40 cm            Admit Date:           6/27/2025  Weight:             60.33 kg             Admission Status:     Inpatient -                                                                 Routine  BSA / BMI:          1.57 m2 / 25.97      Department Location:  [facility]                      kg/m2  Blood Pressure: 100 /64 mmHg    Study Type:    TRANSTHORACIC ECHO (TTE) COMPLETE  Diagnosis/ICD: Paroxysmal atrial fibrillation-I48.0  Indication:    Atrial Fibrillation  CPT Codes:     Echo Complete w Full [id number]    Patient History:  Pertinent History: HTN, AFIB, MVP.    Study Detail: The following Echo studies were performed: 2D, M-Mode, Doppler,                color flow and Strain. A bubble study was not performed.       PHYSICIAN INTERPRETATION:  Left Ventricle: The left ventricular systolic function is normal with a visually estimated ejection fraction of 60-65%. There is left ventricular hypertrophy involving the septal wall, with an asymmetric distribution. There are no regional wall motion abnormalities. The left ventricular cavity size is normal. There is normal septal and normal posterior left ventricular wall thickness. There is left ventricular hypertrophy involving the septal wall. There is left ventricular concentric remodeling. Left Ventricular Global Longitudinal Strain - 19.0 %. Spectral Doppler shows a Grade I (impaired relaxation pattern) of left ventricular diastolic filling with normal left atrial filling pressure.  Left Atrium: The left atrial size is normal. A bubble study  using agitated saline was not performed.  Right Ventricle: The right ventricle is normal in size. There is normal right ventricular global systolic function.  Right Atrium: The right atrial size is normal.  Aortic Valve: The aortic valve is trileaflet. The aortic valve area by VTI is 1.88 cmï¿½ with a peak velocity of 1.16 m/s. The peak and mean gradients are 5 mmHg and 3 mmHg, respectively, with a dimensionless index of 0.83. There is mild aortic valve regurgitation.  Mitral Valve: The mitral valve is normal in structure. There is trace mitral valve regurgitation. The E Vmax is 0.48 m/s.  Tricuspid Valve: The tricuspid valve is structurally normal. There is trace tricuspid regurgitation. The Doppler estimated right ventricular systolic pressure (RVSP) is within normal limits at 19 mmHg.  Pulmonic Valve: The pulmonic valve is structurally normal. There is trace pulmonic valve regurgitation.  Pericardium: No pericardial effusion noted. There is a pericardial fat pad present.  Aorta: The aortic root is normal.  Systemic Veins: The inferior vena cava appears normal in size, with IVC inspiratory collapse greater than 50%.       CONCLUSIONS:   1. The left ventricular systolic function is normal with a visually estimated ejection fraction of 60-65%.   2. Spectral Doppler shows a Grade I (impaired relaxation pattern) of left ventricular diastolic filling with normal left atrial filling pressure.   3. There is normal right ventricular global systolic function.   4. The Doppler estimated RVSP is within normal limits at 19 mmHg.   5. Mild aortic valve regurgitation.   6. Left Ventricular Global Longitudinal Strain - 19.0 %.    QUANTITATIVE DATA SUMMARY:     2D MEASUREMENTS:             Normal Ranges:  LAs:             3.48 cm     (2.7-4.0cm)  IVSd:            0.82 cm     (0.6-1.1cm)  LVPWd:           0.94 cm     (0.6-1.1cm)  LVIDd:           4.30 cm     (3.9-5.9cm)  LVIDs:           2.98 cm  LV Mass Index:   76.1 g/m2  LVEDV  Index:     38.31 ml/m2  LV % FS          30.6 %       LEFT ATRIUM:                  Normal Ranges:  LA Vol A4C:        38.2 ml    (22+/-6mL/m2)  LA Vol A2C:        41.6 ml  LA Vol BP:         40.3 ml  LA Vol Index A4C:  24.4 ml/m2  LA Vol Index A2C:  26.5 ml/m2  LA Vol Index BP:   25.7 ml/m2  LA Area A4C:       15.0 cm2  LA Area A2C:       15.8 cm2  LA Major Axis A4C: 5.0 cm  LA Major Axis A2C: 5.1 cm  LA Volume Index:   25.7 ml/m2  LA Vol A4C:        36.0 ml  LA Vol A2C:        39.5 ml  LA Vol Index BSA:  24.1 ml/m2       RIGHT ATRIUM:          Normal Ranges:  RA Area A4C:  12.1 cm2       AORTA MEASUREMENTS:         Normal Ranges:  Ao Sinus, d:        3.00 cm (2.1-3.5cm)  Ao STJ, d:          2.70 cm (1.7-3.4cm)  Asc Ao, d:          3.20 cm (2.1-3.4cm)       LV SYSTOLIC FUNCTION:                                         Normal Ranges:  EF-A4C View:                      66 % (>=55%)  EF-A2C View:                      77 %  EF-Biplane:                       72 %  EF-Visual:                        63 %  LV EF Reported:                   63 %  Global Longitudinal Strain (GLS): 19 %       LV DIASTOLIC FUNCTION:           Normal Ranges:  MV Peak E:             0.48 m/s  (0.7-1.2 m/s)  MV Peak A:             0.67 m/s  (0.42-0.7 m/s)  E/A Ratio:             0.73      (1.0-2.2)  MV e'                  0.052 m/s (>8.0)  MV lateral e'          0.06 m/s  MV medial e'           0.05 m/s  E/e' Ratio:            9.37      (<8.0)       MITRAL VALVE:          Normal Ranges:  MV DT:        274 msec (150-240msec)       AORTIC VALVE:                     Normal Ranges:  AoV Vmax:                1.16 m/s (<=1.7m/s)  AoV Peak P.4 mmHg (<20mmHg)  AoV Mean PG:             3.0 mmHg (1.7-11.5mmHg)  LVOT Max Wagner:            0.97 m/s (<=1.1m/s)  AoV VTI:                 22.56 cm (18-25cm)  LVOT VTI:                18.81 cm  LVOT Diameter:           1.69 cm  (1.8-2.4cm)  AoV Area, VTI:           1.88 cm2 (2.5-5.5cm2)  AoV  Area,Vmax:           1.89 cm2 (2.5-4.5cm2)  AoV Dimensionless Index: 0.83       AORTIC INSUFFICIENCY:  AI Vmax:       3.66 m/s  AI Half-time:  474 msec  AI Decel Time: 1633 msec  AI Decel Rate: 239.49 cm/s2       RIGHT VENTRICLE:  RV Basal 2.70 cm  RV Mid   2.50 cm  RV Major 5.9 cm  TAPSE:   21.1 mm  RV s'    0.16 m/s       TRICUSPID VALVE/RVSP:          Normal Ranges:  Peak TR Velocity:     1.99 m/s  Est. RA Pressure:     3 mmHg  RV Syst Pressure:     19 mmHg  (< 30mmHg)  IVC Diam:             1.54 cm       AORTA:  Asc Ao Diam 3.21 cm       [id number] Jason Estrada DO  Electronically signed on 6/28/2025 at 12:55:05 PM       ** Final **      Physical Exam  Patient is awake and orient, not in apparent distress  Eyes: PERRLA, no conjunctival congestion  Chest: Bilateral Air entry, no crackles or wheezing  Heart: s1S2 regular, no murmur  Abdomen: Soft, non tender, BS present  Ext: Deformed left hand  Relevant Results         This patient currently has cardiac telemetry ordered; if you would like to modify or discontinue the telemetry order, click here to go to the orders activity to modify/discontinue the order.    Assessment & Plan    A-fib with RVR  On acebutolol for rate control as well as Eliquis for anticoagulation  Echocardiogram shows normal LV systolic function  Rhythm converted to NSR  Discharge planning to [facility] for rehab-awaiting auth    Acute cystitis with hematuria  Ceftriaxone complete  Follow cultures-growth indicates contamination    Hypertension  Continue current medication, monitor blood pressure and adjust as needed.    Physical debility/deconditioning  PT/OT rec SNF     Parkinsonism  Not on any medication  Neurology follow-up as an outpatient    Victoriano Sprague MD PhD

## 2025-07-02 NOTE — CARE PLAN
The patient's goals for the shift include      The clinical goals for the shift include Patient will be free of fall or injury      Problem: Skin  Goal: Decreased wound size/increased tissue granulation at next dressing change  7/2/2025 0219 by Sahara Drew RN  Flowsheets (Taken 6/28/2025 0325 by Marleen Peng RN)  Decreased wound size/increased tissue granulation at next dressing change: Protective dressings over bony prominences  7/2/2025 0218 by Sahara Drew RN  Outcome: Progressing  Goal: Participates in plan/prevention/treatment measures  7/2/2025 0219 by Sahara Drew RN  Flowsheets (Taken 7/2/2025 0219)  Participates in plan/prevention/treatment measures: Discuss with provider PT/OT consult  7/2/2025 0218 by Sahara Drew RN  Outcome: Progressing  Goal: Prevent/manage excess moisture  7/2/2025 0219 by Sahara Drew RN  Flowsheets (Taken 7/2/2025 0219)  Prevent/manage excess moisture: Monitor for/manage infection if present  7/2/2025 0218 by Sahara Drew RN  Outcome: Progressing  Goal: Prevent/minimize sheer/friction injuries  7/2/2025 0219 by Sahara Drew RN  Flowsheets (Taken 7/2/2025 0219)  Prevent/minimize sheer/friction injuries: Use pull sheet  7/2/2025 0218 by Sahara Drew RN  Outcome: Progressing  Goal: Promote/optimize nutrition  Outcome: Progressing  Goal: Promote skin healing  Outcome: Progressing

## 2025-07-02 NOTE — PROGRESS NOTES
Occupational Therapy    OT Treatment    Patient Name: Nadja Rodriguez  MRN: [id number]  Department: [address]  Room: 2015/2015-A  Today's Date: 7/2/2025  Time Calculation  Start Time: 1415  Stop Time: 1438  Time Calculation (min): 23 min        Assessment: Pt. Now MIN A x1-2 for bed mobility and transfers pt. Needs cue for self independence tend to rely on staff to assist.   Barriers to Discharge Home: Caregiver assistance  End of Session Communication: Bedside nurse  End of Session Patient Position: Alarm on, Up in chair     Plan:  Treatment Interventions: ADL retraining, Functional transfer training, UE strengthening/ROM, Endurance training, Patient/family training, Equipment evaluation/education, Fine motor coordination activities, Compensatory technique education      Subjective   OT Visit Info:  OT Received On: 07/02/25  General Visit Info:  General  Prior to Session Communication: Bedside nurse, PCT/NA/CTA  Patient Position Received: Bed, 3 rail up, Alarm on  General Comment: pt. reports she was going to nursing facility today. Checked with RN she is not. Pt. informed she is not being d/c . Pt. agreeable to get up to chair. Pt. needed MAX A for toileting , transfers MIN A x1 cues for sequencing kaleb walker. hand hygiene MOD A LUE      Pain:  Pain Assessment  Pain Assessment: 0-10  0-10 (Numeric) Pain Score: 0 - No pain    Objective    Cognition:  Cognition  Overall Cognitive Status: Within Functional Limits       Activities of Daily Living:      Grooming  Grooming Level of Assistance: Moderate assistance  Grooming Where Assessed: Chair  Grooming Comments: hygiene to L hand      LE Dressing  LE Dressing: Yes  Adult Briefs Level of Assistance: Maximum assistance  LE Dressing Where Assessed: Edge of bed    Toileting  Toileting Level of Assistance: Maximum assistance  Where Assessed:  (from EOB)  Functional Standing Tolerance:  Time: 3 mins  Activity: standing for toileting and transfer to chair  Functional Standing  Tolerance Comments: MIN A with patient using cuong walker  Bed Mobility/Transfers: Bed Mobility  Bed Mobility: Yes  Bed Mobility 1  Bed Mobility 1: Supine to sitting  Level of Assistance 1: Minimum assistance, +2  Bed Mobility Comments 1: poor effort with sitting EOB    Transfers  Transfer: Yes  Transfer 1  Transfer From 1: Bed to  Transfer to 1: Chair with arms  Technique 1: Sit to stand, Stand to sit, Stand pivot  Transfer Device 1: Cuong-walker  Transfer Level of Assistance 1: Minimum assistance, Minimal verbal cues, Minimal tactile cues      Outcome Measures:[facility] Daily Activity  Putting on and taking off regular lower body clothing: A lot  Bathing (including washing, rinsing, drying): A lot  Putting on and taking off regular upper body clothing: A lot  Toileting, which includes using toilet, bedpan or urinal: A lot  Taking care of personal grooming such as brushing teeth: A little  Eating Meals: A little  Daily Activity - Total Score: 14        Education Documentation  Body Mechanics, taught by BERE Gore at 7/2/2025  3:08 PM.  Learner: Patient  Readiness: Acceptance  Method: Explanation  Response: Verbalizes Understanding, Needs Reinforcement    Precautions, taught by BERE Gore at 7/2/2025  3:08 PM.  Learner: Patient  Readiness: Acceptance  Method: Explanation  Response: Verbalizes Understanding, Needs Reinforcement    Education Comments  No comments found.        OP EDUCATION:       Goals:  Encounter Problems       Encounter Problems (Active)       ADLs       Patient will complete toileting including hygiene clothing management/hygiene with stand by assist level of assistance. (Progressing)       Start:  06/28/25    Expected End:  07/12/25               MOBILITY       Patient will perform Functional mobility min Household distances/Community Distances with stand by assist level of assistance and least restrictive device in order to improve safety and functional mobility.  (Progressing)       Start:  06/28/25    Expected End:  07/12/25               TRANSFERS       Patient will perform bed mobility stand by assist level of assistance in order to improve safety and independence with mobility (Progressing)       Start:  06/28/25    Expected End:  07/12/25            Patient will complete functional transfers with least restrictive device with stand by assist level of assistance. (Progressing)       Start:  06/28/25    Expected End:  07/12/25

## 2025-07-02 NOTE — PROGRESS NOTES
Physical Therapy                 Therapy Communication Note    Patient Name: Nadja Rodriguez  MRN: [id number]  Department: [facility] 2 [facility]  Room: 2015/2015-A  Today's Date: 7/2/2025     Discipline: Physical Therapy    Missed Visit: PT Missed Visit: Yes     Missed Visit Reason: Missed Visit Reason: Patient refused (stating she is being discharged today. explained no order in yet, but patient adamantly declines therapy)    Missed Time: Attempt    Comment:

## 2025-07-03 VITALS
HEART RATE: 66 BPM | DIASTOLIC BLOOD PRESSURE: 76 MMHG | OXYGEN SATURATION: 91 % | TEMPERATURE: 97.8 F | SYSTOLIC BLOOD PRESSURE: 146 MMHG | WEIGHT: 136.69 LBS | RESPIRATION RATE: 16 BRPM | BODY MASS INDEX: 26.84 KG/M2 | HEIGHT: 60 IN

## 2025-07-03 LAB
ANION GAP SERPL CALC-SCNC: 12 MMOL/L (ref 10–20)
BUN SERPL-MCNC: 25 MG/DL (ref 6–23)
CALCIUM SERPL-MCNC: 8.1 MG/DL (ref 8.6–10.3)
CHLORIDE SERPL-SCNC: 108 MMOL/L (ref 98–107)
CO2 SERPL-SCNC: 20 MMOL/L (ref 21–32)
CREAT SERPL-MCNC: 1.31 MG/DL (ref 0.5–1.05)
EGFRCR SERPLBLD CKD-EPI 2021: 39 ML/MIN/1.73M*2
GLUCOSE SERPL-MCNC: 103 MG/DL (ref 74–99)
MAGNESIUM SERPL-MCNC: 2.08 MG/DL (ref 1.6–2.4)
POTASSIUM SERPL-SCNC: 3.4 MMOL/L (ref 3.5–5.3)
SODIUM SERPL-SCNC: 137 MMOL/L (ref 136–145)

## 2025-07-03 PROCEDURE — 82374 ASSAY BLOOD CARBON DIOXIDE: CPT | Performed by: INTERNAL MEDICINE

## 2025-07-03 PROCEDURE — 2500000002 HC RX 250 W HCPCS SELF ADMINISTERED DRUGS (ALT 637 FOR MEDICARE OP, ALT 636 FOR OP/ED): Performed by: INTERNAL MEDICINE

## 2025-07-03 PROCEDURE — 2500000001 HC RX 250 WO HCPCS SELF ADMINISTERED DRUGS (ALT 637 FOR MEDICARE OP): Performed by: STUDENT IN AN ORGANIZED HEALTH CARE EDUCATION/TRAINING PROGRAM

## 2025-07-03 PROCEDURE — 36415 COLL VENOUS BLD VENIPUNCTURE: CPT | Performed by: INTERNAL MEDICINE

## 2025-07-03 PROCEDURE — 2500000001 HC RX 250 WO HCPCS SELF ADMINISTERED DRUGS (ALT 637 FOR MEDICARE OP): Performed by: INTERNAL MEDICINE

## 2025-07-03 PROCEDURE — 2500000002 HC RX 250 W HCPCS SELF ADMINISTERED DRUGS (ALT 637 FOR MEDICARE OP, ALT 636 FOR OP/ED): Performed by: STUDENT IN AN ORGANIZED HEALTH CARE EDUCATION/TRAINING PROGRAM

## 2025-07-03 PROCEDURE — 99239 HOSP IP/OBS DSCHRG MGMT >30: CPT | Performed by: INTERNAL MEDICINE

## 2025-07-03 PROCEDURE — 97530 THERAPEUTIC ACTIVITIES: CPT | Mod: GP,CQ

## 2025-07-03 PROCEDURE — 83735 ASSAY OF MAGNESIUM: CPT | Performed by: INTERNAL MEDICINE

## 2025-07-03 PROCEDURE — 97535 SELF CARE MNGMENT TRAINING: CPT | Mod: GO,CO

## 2025-07-03 PROCEDURE — 2500000004 HC RX 250 GENERAL PHARMACY W/ HCPCS (ALT 636 FOR OP/ED): Performed by: INTERNAL MEDICINE

## 2025-07-03 PROCEDURE — 97110 THERAPEUTIC EXERCISES: CPT | Mod: GP,CQ

## 2025-07-03 RX ORDER — OXYCODONE HYDROCHLORIDE 5 MG/1
5 TABLET ORAL EVERY 6 HOURS PRN
Qty: 15 TABLET | Refills: 0 | Status: SHIPPED | OUTPATIENT
Start: 2025-07-03

## 2025-07-03 RX ORDER — POTASSIUM CHLORIDE 750 MG/1
20 TABLET, FILM COATED, EXTENDED RELEASE ORAL ONCE
Status: COMPLETED | OUTPATIENT
Start: 2025-07-03 | End: 2025-07-03

## 2025-07-03 RX ORDER — SODIUM CHLORIDE, SODIUM LACTATE, POTASSIUM CHLORIDE, CALCIUM CHLORIDE 600; 310; 30; 20 MG/100ML; MG/100ML; MG/100ML; MG/100ML
75 INJECTION, SOLUTION INTRAVENOUS CONTINUOUS
Status: DISCONTINUED | OUTPATIENT
Start: 2025-07-03 | End: 2025-07-04 | Stop reason: HOSPADM

## 2025-07-03 RX ADMIN — APIXABAN 5 MG: 5 TABLET, FILM COATED ORAL at 19:29

## 2025-07-03 RX ADMIN — ACEBUTOLOL HYDROCHLORIDE 200 MG: 200 CAPSULE ORAL at 08:20

## 2025-07-03 RX ADMIN — POTASSIUM CHLORIDE 20 MEQ: 750 TABLET, FILM COATED, EXTENDED RELEASE ORAL at 08:21

## 2025-07-03 RX ADMIN — SODIUM CHLORIDE, SODIUM LACTATE, POTASSIUM CHLORIDE, AND CALCIUM CHLORIDE 75 ML/HR: .6; .31; .03; .02 INJECTION, SOLUTION INTRAVENOUS at 08:20

## 2025-07-03 RX ADMIN — APIXABAN 5 MG: 5 TABLET, FILM COATED ORAL at 08:20

## 2025-07-03 RX ADMIN — POTASSIUM CHLORIDE EXTENDED-RELEASE 20 MEQ: 1500 TABLET ORAL at 08:21

## 2025-07-03 RX ADMIN — OXYCODONE HYDROCHLORIDE 5 MG: 5 TABLET ORAL at 08:22

## 2025-07-03 RX ADMIN — PANTOPRAZOLE SODIUM 40 MG: 40 TABLET, DELAYED RELEASE ORAL at 07:17

## 2025-07-03 RX ADMIN — FLUTICASONE PROPIONATE 1 SPRAY: 50 SPRAY, METERED NASAL at 08:21

## 2025-07-03 ASSESSMENT — COGNITIVE AND FUNCTIONAL STATUS - GENERAL
MOVING TO AND FROM BED TO CHAIR: A LOT
DRESSING REGULAR UPPER BODY CLOTHING: A LITTLE
PERSONAL GROOMING: A LITTLE
WALKING IN HOSPITAL ROOM: A LOT
DRESSING REGULAR LOWER BODY CLOTHING: A LITTLE
WALKING IN HOSPITAL ROOM: A LITTLE
HELP NEEDED FOR BATHING: A LITTLE
MOVING FROM LYING ON BACK TO SITTING ON SIDE OF FLAT BED WITH BEDRAILS: A LITTLE
MOVING FROM LYING ON BACK TO SITTING ON SIDE OF FLAT BED WITH BEDRAILS: A LITTLE
MOBILITY SCORE: 12
DRESSING REGULAR LOWER BODY CLOTHING: A LOT
EATING MEALS: A LITTLE
DAILY ACTIVITIY SCORE: 14
STANDING UP FROM CHAIR USING ARMS: A LOT
HELP NEEDED FOR BATHING: A LOT
TOILETING: A LITTLE
CLIMB 3 TO 5 STEPS WITH RAILING: TOTAL
EATING MEALS: A LITTLE
CLIMB 3 TO 5 STEPS WITH RAILING: A LITTLE
DRESSING REGULAR UPPER BODY CLOTHING: A LOT
DAILY ACTIVITIY SCORE: 18
STANDING UP FROM CHAIR USING ARMS: A LITTLE
TURNING FROM BACK TO SIDE WHILE IN FLAT BAD: A LITTLE
MOVING TO AND FROM BED TO CHAIR: A LITTLE
MOBILITY SCORE: 18
PERSONAL GROOMING: A LITTLE
TOILETING: A LOT
TURNING FROM BACK TO SIDE WHILE IN FLAT BAD: A LOT

## 2025-07-03 ASSESSMENT — PAIN - FUNCTIONAL ASSESSMENT
PAIN_FUNCTIONAL_ASSESSMENT: 0-10

## 2025-07-03 ASSESSMENT — PAIN SCALES - GENERAL
PAINLEVEL_OUTOF10: 0 - NO PAIN
PAINLEVEL_OUTOF10: 0 - NO PAIN
PAINLEVEL_OUTOF10: 7
PAINLEVEL_OUTOF10: 3
PAINLEVEL_OUTOF10: 0 - NO PAIN

## 2025-07-03 ASSESSMENT — ACTIVITIES OF DAILY LIVING (ADL): HOME_MANAGEMENT_TIME_ENTRY: 11

## 2025-07-03 ASSESSMENT — PAIN DESCRIPTION - ORIENTATION: ORIENTATION: LEFT

## 2025-07-03 ASSESSMENT — PAIN DESCRIPTION - LOCATION: LOCATION: HAND

## 2025-07-03 ASSESSMENT — PAIN DESCRIPTION - DESCRIPTORS: DESCRIPTORS: ACHING

## 2025-07-03 NOTE — PROGRESS NOTES
Occupational Therapy    Occupational Therapy Treatment    Name: Nadja Rodriguez  MRN: [id number]  Department: [address]  Room: 2015/2015-A  Date: 07/03/25  Time Calculation  Start Time: 1456  Stop Time: 1507  Time Calculation (min): 11 min    Assessment:  OT Assessment: Pt declines OOB activity this session or bed level BUE exercises. Pt completed UB bathing and hair care at bed level with setup-Min A.  Barriers to Discharge Home: Caregiver assistance  End of Session Communication: Bedside nurse  End of Session Patient Position: Bed, 3 rail up, Alarm on  Plan:  Treatment Interventions: ADL retraining, Functional transfer training, UE strengthening/ROM, Endurance training, Patient/family training, Equipment evaluation/education, Fine motor coordination activities, Compensatory technique education  OT Frequency: 4 times per week  OT Discharge Recommendations: Moderate intensity level of continued care  Equipment Recommended upon Discharge:  (TBD)  OT Recommended Transfer Status: Assist of 2  OT - OK to Discharge: Yes    Subjective     OT Visit Info:  OT Received On: 07/03/25  General:  General  Prior to Session Communication: Bedside nurse  Patient Position Received: Bed, 3 rail up, Alarm on  General Comment: Pt alert and agreeable to therapy with encouragement  Precautions:  Medical Precautions: Fall precautions  Precautions Comment: non functional LUE/ contracted    Pain Assessment:  Pain Assessment  Pain Assessment: 0-10  0-10 (Numeric) Pain Score: 0 - No pain    Objective   Cognition:  Overall Cognitive Status: Within Functional Limits  Activities of Daily Living:      Grooming  Grooming Level of Assistance: Setup  Grooming Where Assessed: Bed level  Grooming Comments: Pt combed hair at bed level with setup    UE Bathing  UE Bathing Level of Assistance: Minimum assistance  UE Bathing Where Assessed: Bed level  UE Bathing Comments: Pt cleanses LUE, face, chest w/cloth at bed level. Assist for  thoroughness.    Therapy/Activity:      Therapeutic Activity  Therapeutic Activity Performed: Yes  Therapeutic Activity 1: Pt participated in bed level ADLs to increase independence and activity tolerance.    Outcome Measures:  [facility] Daily Activity  Putting on and taking off regular lower body clothing: A lot  Bathing (including washing, rinsing, drying): A lot  Putting on and taking off regular upper body clothing: A lot  Toileting, which includes using toilet, bedpan or urinal: A lot  Taking care of personal grooming such as brushing teeth: A little  Eating Meals: A little  Daily Activity - Total Score: 14        Education Documentation  Body Mechanics, taught by BIN Hernandez at 7/3/2025  3:14 PM.  Learner: Patient  Readiness: Acceptance  Method: Explanation  Response: Needs Reinforcement    Education Comments  No comments found.      Goals:  Encounter Problems       Encounter Problems (Active)       ADLs       Patient will complete toileting including hygiene clothing management/hygiene with stand by assist level of assistance. (Progressing)       Start:  06/28/25    Expected End:  07/12/25               MOBILITY       Patient will perform Functional mobility min Household distances/Community Distances with stand by assist level of assistance and least restrictive device in order to improve safety and functional mobility. (Progressing)       Start:  06/28/25    Expected End:  07/12/25               TRANSFERS       Patient will perform bed mobility stand by assist level of assistance in order to improve safety and independence with mobility (Progressing)       Start:  06/28/25    Expected End:  07/12/25            Patient will complete functional transfers with least restrictive device with stand by assist level of assistance. (Progressing)       Start:  06/28/25    Expected End:  07/12/25

## 2025-07-03 NOTE — CONSULTS
Nutrition Initial Assessment:   Nutrition Assessment    Reason for Assessment: Length of stay    Patient is a 89 y.o. female presenting with UTI. Seeing Pt for LOS day 6.     Nutrition History:  Energy Intake: Fair 50-75 %  Food and Nutrient History: Seeing Pt for LOS day 6. Eating fair 50-75% of meals. Wt trending down over the past 1-2 years, nothing clinically significant. No nutritional concerns at this time.    Anthropometrics:  Height: (!) 152.4 cm (5')   Weight: 62 kg (136 lb 11 oz)   BMI (Calculated): 26.69    Weight History:   Daily Weight  07/03/25 : 62 kg (136 lb 11 oz)  06/16/25 : 59 kg (130 lb)  06/08/25 : 59 kg (130 lb)  09/27/24 : 62.6 kg (138 lb)  07/26/24 : 62.6 kg (138 lb)  03/13/24 : 64.9 kg (143 lb)  02/12/24 : 61.2 kg (135 lb)  01/30/24 : 64.2 kg (141 lb 8 oz)  07/06/23 : 62.6 kg (138 lb)  10/31/22 : 66.2 kg (146 lb)     Weight Change %:  Significant Weight Loss: No    Nutrition Focused Physical Exam Findings:  Subcutaneous Fat Loss:   Defer Subcutaneous Fat Loss Assessment: Defer all  Defer All Reason: unable to assess  Muscle Wasting:  Defer Muscle Wasting Assessment: Defer all  Defer All Reason: unable to assess  Edema:  Edema: +2 mild  Edema Location: BLE  Physical Findings:  Skin: Positive (Noting wound to sacrum)    Nutrition Significant Labs:  CBC Trend:   Results from last 7 days   Lab Units 06/29/25  0532 06/28/25  0359 06/27/25  1408   WBC AUTO x10*3/uL 8.7 10.3 12.0*   RBC AUTO x10*6/uL 4.59 4.41 5.16   HEMOGLOBIN g/dL 13.5 12.8 15.1   HEMATOCRIT % 40.1 39.2 45.2   MCV fL 87 89 88   PLATELETS AUTO x10*3/uL 233 221 243    , BMP Trend:   Results from last 7 days   Lab Units 07/03/25  0529 07/02/25  0426 07/01/25  0518 06/29/25  0532   GLUCOSE mg/dL 103* 103* 100* 106*   CALCIUM mg/dL 8.1* 8.6 8.4* 8.5*   SODIUM mmol/L 137 136 136 137   POTASSIUM mmol/L 3.4* 4.3 4.1 4.2   CO2 mmol/L 20* 26 26 25   CHLORIDE mmol/L 108* 104 104 106   BUN mg/dL 25* 15 16 15   CREATININE mg/dL 1.31* 0.48*  "0.42* 0.36*    , A1C:No results found for: \"HGBA1C\"    Nutrition Specific Medications:  Scheduled Medications[1]     I/O:   Last BM Date: 07/01/25; Stool Appearance: Soft (07/02/25 2059)    Dietary Orders (From admission, onward)       Start     Ordered    06/29/25 1237  Adult diet Regular  Diet effective now        Question:  Diet type  Answer:  Regular    06/29/25 1237    06/27/25 1925  May Participate in Room Service With Assistance  ( ROOM SERVICE MAY PARTICIPATE WITH ASSISTANCE)  Once        Question:  .  Answer:  Yes    06/27/25 1924                     Estimated Needs:   Total Energy Estimated Needs in 24 hours (kCal): 1550 kCal  Method for Estimating Needs: 25kcals/kg BW  Total Protein Estimated Needs in 24 Hours (g): 74 g  Method for Estimating 24 Hour Protein Needs: 1.2g/kg BW     Method for Estimating 24 Hour Fluid Needs: 1 ml/kcal or per MD        Nutrition Diagnosis   Malnutrition Diagnosis  Patient has Malnutrition Diagnosis: No    Nutrition Diagnosis  Patient has Nutrition Diagnosis: Yes  Diagnosis Status (1): New  Nutrition Diagnosis 1: No nutrition diagnosis at this time       Nutrition Interventions/Recommendations   Nutrition prescription for oral nutrition    Nutrition Recommendations:  Individualized Nutrition Prescription Provided for : Continue with regular diet order.    Nutrition Interventions/Goals:   Meals and Snacks: General healthful diet  Goal: consume 3 meals daily      Education Documentation  No documentation found.    N/a    Nutrition Monitoring and Evaluation   Estimated Energy Intake: Energy intake greater or equal to 75% of estimated energy needs  Intake / Amount of food: Consumes at least 75% or more of meals/snacks/supplements, Meets > 75% estimated energy needs    Body Weight: Body weight - Maintain stable weight    Electrolyte and Renal Panel: Electrolytes within normal limits    Skin Finding: Impaired wound healing - Improved wound healing  Edema Finding: +2 Pitting " edema    Goal Status: New goal(s) identified    Time Spent (min): 60 minutes       [1] acebutolol, 200 mg, oral, Daily  apixaban, 5 mg, oral, q12h  fluticasone, 1 spray, Each Nostril, Daily  pantoprazole, 40 mg, oral, Daily before breakfast   Or  pantoprazole, 40 mg, intravenous, Daily before breakfast  potassium chloride CR, 20 mEq, oral, Daily

## 2025-07-03 NOTE — PROGRESS NOTES
Discharge transport confirmed for 1630 via [facility]. Transport time and report # sent to nursing via Secure Chat.  Updated pt's son Tejas in regards to discharge and transport time.

## 2025-07-03 NOTE — PROGRESS NOTES
Auth requested with  auth team. Auth pending to [facility]. [facility] following.     1330: MD notified of auth approval. Facility updated on dc for today. TCC to provide transport information once confirmed.

## 2025-07-03 NOTE — PROGRESS NOTES
Enio Herron is a 89 y.o. female on day 6 of admission presenting with Urinary tract infection with hematuria, site unspecified.      Subjective   Enio Herron is a 89 y.o. female with PMHx s/f HTN, hypokalemia, osteoporosis, palpitations (presumed afib), Parkinsons presenting with weakness and fall.  Initial workup in ER shows tachycardia as well as elevated blood pressure, mild leukocytosis with WBC of 12, UA suggestive of UTI.  She was admitted with a diagnosis of new onset of A-fib with RVR as well as UTI leading to weakness and fall.  Status post Cardizem IV push in ER, started on IV Rocephin, Lovenox at therapeutic dose, IV fluid.  Echocardiogram ordered and cardiology consulted.  Evaluated by cardiology and recommendation appreciated.  Rhythm converted to NSR, anticoagulation switched to oral Eliquis.  Discharge planning to [facility] rehabilitation-awaiting acceptance.    6/30: She was struggling with chronic L hand pain this AM. 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    7/1: Similar to yesterday.     7/2: Essentially unchanged.     7/3: Remains unchanged.     Objective     Last Recorded Vitals  /90 (BP Location: Right arm, Patient Position: Lying)   Pulse 74   Temp 35.8 °C (96.5 °F) (Temporal)   Resp 17   Wt 62 kg (136 lb 11 oz)   SpO2 95%   Intake/Output last 3 Shifts:    Intake/Output Summary (Last 24 hours) at 7/3/2025 1056  Last data filed at 7/3/2025 0944  Gross per 24 hour   Intake 714 ml   Output --   Net 714 ml       Admission Weight  Weight: 59 kg (130 lb) (06/27/25 1405)    Daily Weight  07/03/25 : 62 kg (136 lb 11 oz)    Image Results  Transthoracic Echo Complete                [address] [address]       Phone [phone] Fax [phone]    TRANSTHORACIC ECHOCARDIOGRAM REPORT    Patient Name:       ENIO HERRON      Reading Physician:    [id number] Jason Estrada                                                                  DO  Study Date:         6/28/2025            Ordering Provider:    [id number] LU FARAH  MRN/PID:            [id number]             Fellow:  Accession#:         [id number]         Nurse:  Date of Birth/Age:  1935 / 89 years Sonographer:          Joy Alvarez RDCS  Gender Assigned at  F                    Additional Staff:  Birth:  Height:             152.40 cm            Admit Date:           6/27/2025  Weight:             60.33 kg             Admission Status:     Inpatient -                                                                 Routine  BSA / BMI:          1.57 m2 / 25.97      Department Location:  [facility]                      kg/m2  Blood Pressure: 100 /64 mmHg    Study Type:    TRANSTHORACIC ECHO (TTE) COMPLETE  Diagnosis/ICD: Paroxysmal atrial fibrillation-I48.0  Indication:    Atrial Fibrillation  CPT Codes:     Echo Complete w Full [id number]    Patient History:  Pertinent History: HTN, AFIB, MVP.    Study Detail: The following Echo studies were performed: 2D, M-Mode, Doppler,                color flow and Strain. A bubble study was not performed.       PHYSICIAN INTERPRETATION:  Left Ventricle: The left ventricular systolic function is normal with a visually estimated ejection fraction of 60-65%. There is left ventricular hypertrophy involving the septal wall, with an asymmetric distribution. There are no regional wall motion abnormalities. The left ventricular cavity size is normal. There is normal septal and normal posterior left ventricular wall thickness. There is left ventricular hypertrophy involving the septal wall. There is left ventricular concentric remodeling. Left Ventricular Global Longitudinal Strain - 19.0 %. Spectral Doppler shows a Grade I (impaired relaxation pattern) of left ventricular diastolic filling with normal left atrial filling pressure.  Left Atrium: The left atrial size is normal. A  bubble study using agitated saline was not performed.  Right Ventricle: The right ventricle is normal in size. There is normal right ventricular global systolic function.  Right Atrium: The right atrial size is normal.  Aortic Valve: The aortic valve is trileaflet. The aortic valve area by VTI is 1.88 cmï¿½ with a peak velocity of 1.16 m/s. The peak and mean gradients are 5 mmHg and 3 mmHg, respectively, with a dimensionless index of 0.83. There is mild aortic valve regurgitation.  Mitral Valve: The mitral valve is normal in structure. There is trace mitral valve regurgitation. The E Vmax is 0.48 m/s.  Tricuspid Valve: The tricuspid valve is structurally normal. There is trace tricuspid regurgitation. The Doppler estimated right ventricular systolic pressure (RVSP) is within normal limits at 19 mmHg.  Pulmonic Valve: The pulmonic valve is structurally normal. There is trace pulmonic valve regurgitation.  Pericardium: No pericardial effusion noted. There is a pericardial fat pad present.  Aorta: The aortic root is normal.  Systemic Veins: The inferior vena cava appears normal in size, with IVC inspiratory collapse greater than 50%.       CONCLUSIONS:   1. The left ventricular systolic function is normal with a visually estimated ejection fraction of 60-65%.   2. Spectral Doppler shows a Grade I (impaired relaxation pattern) of left ventricular diastolic filling with normal left atrial filling pressure.   3. There is normal right ventricular global systolic function.   4. The Doppler estimated RVSP is within normal limits at 19 mmHg.   5. Mild aortic valve regurgitation.   6. Left Ventricular Global Longitudinal Strain - 19.0 %.    QUANTITATIVE DATA SUMMARY:     2D MEASUREMENTS:             Normal Ranges:  LAs:             3.48 cm     (2.7-4.0cm)  IVSd:            0.82 cm     (0.6-1.1cm)  LVPWd:           0.94 cm     (0.6-1.1cm)  LVIDd:           4.30 cm     (3.9-5.9cm)  LVIDs:           2.98 cm  LV Mass Index:   76.1  g/m2  LVEDV Index:     38.31 ml/m2  LV % FS          30.6 %       LEFT ATRIUM:                  Normal Ranges:  LA Vol A4C:        38.2 ml    (22+/-6mL/m2)  LA Vol A2C:        41.6 ml  LA Vol BP:         40.3 ml  LA Vol Index A4C:  24.4 ml/m2  LA Vol Index A2C:  26.5 ml/m2  LA Vol Index BP:   25.7 ml/m2  LA Area A4C:       15.0 cm2  LA Area A2C:       15.8 cm2  LA Major Axis A4C: 5.0 cm  LA Major Axis A2C: 5.1 cm  LA Volume Index:   25.7 ml/m2  LA Vol A4C:        36.0 ml  LA Vol A2C:        39.5 ml  LA Vol Index BSA:  24.1 ml/m2       RIGHT ATRIUM:          Normal Ranges:  RA Area A4C:  12.1 cm2       AORTA MEASUREMENTS:         Normal Ranges:  Ao Sinus, d:        3.00 cm (2.1-3.5cm)  Ao STJ, d:          2.70 cm (1.7-3.4cm)  Asc Ao, d:          3.20 cm (2.1-3.4cm)       LV SYSTOLIC FUNCTION:                                         Normal Ranges:  EF-A4C View:                      66 % (>=55%)  EF-A2C View:                      77 %  EF-Biplane:                       72 %  EF-Visual:                        63 %  LV EF Reported:                   63 %  Global Longitudinal Strain (GLS): 19 %       LV DIASTOLIC FUNCTION:           Normal Ranges:  MV Peak E:             0.48 m/s  (0.7-1.2 m/s)  MV Peak A:             0.67 m/s  (0.42-0.7 m/s)  E/A Ratio:             0.73      (1.0-2.2)  MV e'                  0.052 m/s (>8.0)  MV lateral e'          0.06 m/s  MV medial e'           0.05 m/s  E/e' Ratio:            9.37      (<8.0)       MITRAL VALVE:          Normal Ranges:  MV DT:        274 msec (150-240msec)       AORTIC VALVE:                     Normal Ranges:  AoV Vmax:                1.16 m/s (<=1.7m/s)  AoV Peak P.4 mmHg (<20mmHg)  AoV Mean PG:             3.0 mmHg (1.7-11.5mmHg)  LVOT Max Wagner:            0.97 m/s (<=1.1m/s)  AoV VTI:                 22.56 cm (18-25cm)  LVOT VTI:                18.81 cm  LVOT Diameter:           1.69 cm  (1.8-2.4cm)  AoV Area, VTI:           1.88 cm2  (2.5-5.5cm2)  AoV Area,Vmax:           1.89 cm2 (2.5-4.5cm2)  AoV Dimensionless Index: 0.83       AORTIC INSUFFICIENCY:  AI Vmax:       3.66 m/s  AI Half-time:  474 msec  AI Decel Time: 1633 msec  AI Decel Rate: 239.49 cm/s2       RIGHT VENTRICLE:  RV Basal 2.70 cm  RV Mid   2.50 cm  RV Major 5.9 cm  TAPSE:   21.1 mm  RV s'    0.16 m/s       TRICUSPID VALVE/RVSP:          Normal Ranges:  Peak TR Velocity:     1.99 m/s  Est. RA Pressure:     3 mmHg  RV Syst Pressure:     19 mmHg  (< 30mmHg)  IVC Diam:             1.54 cm       AORTA:  Asc Ao Diam 3.21 cm       [id number] Jason Estrada DO  Electronically signed on 6/28/2025 at 12:55:05 PM       ** Final **      Physical Exam  Patient is awake and orient, not in apparent distress  Eyes: PERRLA, no conjunctival congestion  Chest: Bilateral Air entry, no crackles or wheezing  Heart: s1S2 regular, no murmur  Abdomen: Soft, non tender, BS present  Ext: Deformed left hand  Relevant Results         This patient currently has cardiac telemetry ordered; if you would like to modify or discontinue the telemetry order, click here to go to the orders activity to modify/discontinue the order.    Assessment & Plan    A-fib with RVR  On acebutolol for rate control as well as Eliquis for anticoagulation  Echocardiogram shows normal LV systolic function  Rhythm converted to NSR  Discharge planning to [facility] for rehab-awaiting auth    Acute cystitis with hematuria  Ceftriaxone complete  Follow cultures-growth indicates contamination    Hypertension  Continue current medication, monitor blood pressure and adjust as needed.    Physical debility/deconditioning  PT/OT rec SNF     Parkinsonism  Not on any medication  Neurology follow-up as an outpatient    Victoriano Sprague MD PhD

## 2025-07-03 NOTE — CARE PLAN
The patient's goals for the shift include      The clinical goals for the shift include Patient will be free of injury throughout shift      Problem: Skin  Goal: Decreased wound size/increased tissue granulation at next dressing change  Outcome: Progressing  Flowsheets (Taken 7/3/2025 0346)  Decreased wound size/increased tissue granulation at next dressing change: Promote sleep for wound healing  Goal: Participates in plan/prevention/treatment measures  Outcome: Progressing  Flowsheets (Taken 7/3/2025 0346)  Participates in plan/prevention/treatment measures: Elevate heels  Goal: Prevent/manage excess moisture  Outcome: Progressing  Flowsheets (Taken 7/2/2025 0219)  Prevent/manage excess moisture: Monitor for/manage infection if present  Goal: Prevent/minimize sheer/friction injuries  Outcome: Progressing  Flowsheets (Taken 7/3/2025 0346)  Prevent/minimize sheer/friction injuries: Use pull sheet  Goal: Promote/optimize nutrition  Outcome: Progressing  Flowsheets (Taken 7/1/2025 0145)  Promote/optimize nutrition: Assist with feeding  Goal: Promote skin healing  Outcome: Progressing  Flowsheets (Taken 7/3/2025 0346)  Promote skin healing: Turn/reposition every 2 hours/use positioning/transfer devices

## 2025-07-03 NOTE — CARE PLAN
The patient's goals for the shift include      The clinical goals for the shift include pt will be free from s/s of infection throughout the shift

## 2025-07-03 NOTE — DISCHARGE SUMMARY
DISCHARGE SUMMARY     Discharge Diagnosis  Urinary tract infection with hematuria, site unspecified    This discharge took greater than 35 minutes.    Test Results Pending At Discharge  Pending Labs       No current pending labs.            Hospital Course   Nadja Rodriguez is a 89 y.o. female with PMHx s/f HTN, hypokalemia, osteoporosis, palpitations (presumed afib), Parkinsons presenting with weakness and fall.  Initial workup in ER shows tachycardia as well as elevated blood pressure, mild leukocytosis with WBC of 12, UA suggestive of UTI.  She was admitted with a diagnosis of new onset of A-fib with RVR as well as UTI leading to weakness and fall.  Status post Cardizem IV push in ER, started on IV Rocephin, Lovenox at therapeutic dose, IV fluid.  Echocardiogram ordered and cardiology consulted.  Evaluated by cardiology and recommendation appreciated.  Rhythm converted to NSR, anticoagulation switched to oral Eliquis.  Discharge planning to [facility]CF rehabilitation-awaiting acceptance.     6/30: She was struggling with chronic L hand pain this AM. 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     7/1: Similar to yesterday.      7/2: Essentially unchanged.      7/3: Remains unchanged.    A-fib with RVR  On acebutolol for rate control as well as Eliquis for anticoagulation  Echocardiogram shows normal LV systolic function  Rhythm converted to NSR  Discharge planning to ECF for rehab-awaiting auth     Acute cystitis with hematuria  Ceftriaxone complete  Follow cultures-growth indicates contamination     Hypertension  Continue current medication, monitor blood pressure and adjust as needed.     Physical debility/deconditioning  PT/OT rec SNF     Parkinsonism  Not on any medication  Neurology follow-up as an outpatient     Chronic Contracture of LUE/Chronic Pain   -Continue oxycodone    Pertinent Physical Exam At Time of Discharge  Patient is awake and orient, not in apparent distress  Eyes: BROCK  no conjunctival congestion  Chest: Bilateral Air entry, no crackles or wheezing  Heart: s1S2 regular, no murmur  Abdomen: Soft, non tender, BS present  Ext: Contracted left hand    Home Medications     Medication List      START taking these medications     oxyCODONE 5 mg immediate release tablet; Commonly known as: Roxicodone;   Take 1 tablet (5 mg) by mouth every 6 hours if needed for severe pain (7 -   10) or moderate pain (4 - 6). G89.4     CONTINUE taking these medications     acebutolol 200 mg capsule; Commonly known as: Sectral; Take 1 capsule   (200 mg) by mouth once daily.   acetaminophen 500 mg tablet; Commonly known as: Tylenol   ascorbic acid 500 mg tablet; Commonly known as: Vitamin C   diclofenac sodium 1 % gel; Commonly known as: Voltaren; Apply 4.5 inches   (4 g) topically 4 times a day.   EpiPen Jr 0.15 mg/0.3 mL injection syringe; Generic drug: EPINEPHrine   fluticasone 50 mcg/actuation nasal spray; Commonly known as: Flonase;   Administer 1 spray into each nostril once daily.   furosemide 40 mg tablet; Commonly known as: Lasix; Take 1 tablet (40 mg)   by mouth once daily.   meloxicam 7.5 mg tablet; Commonly known as: Mobic   multivitamin tablet   potassium chloride ER 10 mEq ER capsule; Commonly known as: Micro-K;   Take 1 capsule (10 mEq) by mouth once daily.   UNABLE TO FIND       Outpatient Follow-Up  No follow-ups on file.     Victoriano Sprague MD PhD  7/3/2025  1:56 PM

## 2025-07-03 NOTE — PROGRESS NOTES
Physical Therapy    Physical Therapy Treatment    Patient Name: Nadja Rodriguez  MRN: [id number]  Department: [address]  Room: 2015/2015-A  Today's Date: 7/3/2025  Time Calculation  Start Time: 1120  Stop Time: 1143  Time Calculation (min): 23 min         Assessment/Plan   PT Assessment  Barriers to Discharge Home: Caregiver assistance, Physical needs  End of Session Communication: Bedside nurse  Assessment Comment: patient progressed to gait trianing with hemiwalker. patient required  mod assist for shifting of left hip.  decreased assistance for bed mobility required. unable to dismiss. requires encouragement to participate  End of Session Patient Position: Up in chair, Alarm on  PT Plan  Inpatient/Swing Bed or Outpatient: Inpatient  PT Plan  Treatment/Interventions: Bed mobility, Transfer training, Gait training, Strengthening, Balance training, Therapeutic exercise, Home exercise program  PT Plan: Ongoing PT  PT Frequency: 4 times per week  PT Discharge Recommendations: Moderate intensity level of continued care  Equipment Recommended upon Discharge:  (Hemiwalker?)  PT Recommended Transfer Status: Assist x2, Assistive device  PT - OK to Discharge: Yes (Once medically appropriate)    PT Visit Info:  PT Received On: 07/03/25  Response to Previous Treatment: Patient reporting fatigue but able to participate.     General Visit Information:   General  Prior to Session Communication: Bedside nurse  Patient Position Received: Bed, 3 rail up, Alarm on  General Comment: patient performed all transfers with min assist x1 , excpet gait , all gait 5 feet x2 mod assist x1 for gait with mod assist for  left hip shifting, seated Marches, LAQ, hip adduction and glute sets 15 reps each . seated  static and dynamic balance x10 mins    Subjective   Precautions:  Precautions  Precautions Comment: non functional LUE/ contracted     Date/Time Vitals Session Patient Position Pulse Resp SpO2 BP MAP (mmHg)    07/03/25 1119 --  --  68  18   93 %  145/88  107                 Objective   Pain:  Pain Assessment  Pain Assessment: 0-10  0-10 (Numeric) Pain Score: 0 - No pain (no compaints of pain)  Cognition:  Cognition  Overall Cognitive Status: Within Functional Limits    Activity Tolerance:  Activity Tolerance  Endurance: Tolerates 30 min exercise with multiple rests  Treatments:  patient performed all transfers with min assist x1 , excpet gait , all gait 5 feet x2 mod assist x1 for gait with mod assist for  left hip shifting, seated Marches, LAQ, hip adduction and glute sets 15 reps each . seated  static and dynamic balance x10 mins    Outcome Measures:  [facility] Basic Mobility  Turning from your back to your side while in a flat bed without using bedrails: A little  Moving from lying on your back to sitting on the side of a flat bed without using bedrails: A lot  Moving to and from bed to chair (including a wheelchair): A lot  Standing up from a chair using your arms (e.g. wheelchair or bedside chair): A lot  To walk in hospital room: A lot  Climbing 3-5 steps with railing: Total  Basic Mobility - Total Score: 12    Education Documentation  Body Mechanics, taught by Brittany Lazo PTA at 7/3/2025 12:21 PM.  Learner: Patient  Readiness: Acceptance  Method: Explanation  Response: Verbalizes Understanding, Needs Reinforcement  Comment: safety    Home Exercise Program, taught by Brittany Lazo PTA at 7/3/2025 12:21 PM.  Learner: Patient  Readiness: Acceptance  Method: Explanation  Response: Verbalizes Understanding, Needs Reinforcement  Comment: safety    Mobility Training, taught by Brittany Lazo PTA at 7/3/2025 12:21 PM.  Learner: Patient  Readiness: Acceptance  Method: Explanation  Response: Verbalizes Understanding, Needs Reinforcement  Comment: safety    Education Comments  No comments found.        OP EDUCATION:       Encounter Problems       Encounter Problems (Active)       PT Problem       Pt will complete supine <> sit bed mobility from flat bed  with Min A  (Progressing)       Start:  06/28/25    Expected End:  07/12/25            Pt will demonstrate sit to stand and bed/chair transfers with Cuong walker/QC + Min A  (Progressing)       Start:  06/28/25    Expected End:  07/12/25            Pt. will ambulate 10ft with Hemiwalker/QC + Min A  (Progressing)       Start:  06/28/25    Expected End:  07/12/25            Pt will maintain balance while reaching outside SHEBA in sitting with B LE support and CGA  (Progressing)       Start:  06/28/25    Expected End:  07/12/25            Pt will complete B LE strengthening HEP with independence  (Progressing)       Start:  06/28/25    Expected End:  07/12/25

## 2025-07-04 NOTE — PROGRESS NOTES
Music Therapy Note    Nadja Rodriguez was referred by Length of stay rounding    Therapy Session  Referral Type: New referral this admission (length of stay rounds)  Visit Type: New visit  Session Start Time: 1055  Session End Time: 1100  Intervention Delivery: In-person     Pre-assessment  Unable to Assess Reason: Outcomes not applicable  Mood/Affect: Appropriate         Treatment/Interventions  Music Therapy Interventions: Assessment    Post-assessment  Total Session Time (min): 5 minutes    Narrative  Assessment Detail: Pt. was sitting up in bed, alert and with a pleasant affect  Plan: MT introduced music therapy services to pt. for during her stay to assist with coping, relaxation, and positive refocus  Evaluation: Pt. agreeable to music therapy but said the afternoon would be better. She [place] and stated that her  was a  and she used to travel around with him.  Follow-up: Will f/u with pt. later this afternoon as able (attempted to see pt. this afternoon but she said she was waiting on discharge papers)    Education Documentation  Coping Strategies, taught by Lizbeth Ramos at 7/4/2025 11:26 AM.  Learner: Patient  Readiness: Acceptance  Method: Explanation  Response: Demonstrated Understanding    Relaxation, taught by Lizbeth Ramos at 7/4/2025 11:26 AM.  Learner: Patient  Readiness: Acceptance  Method: Explanation  Response: Demonstrated Understanding    Focal Points, taught by Lizbeth Ramos at 7/4/2025 11:26 AM.  Learner: Patient  Readiness: Acceptance  Method: Explanation  Response: Demonstrated Understanding

## 2025-07-13 NOTE — DOCUMENTATION CLARIFICATION NOTE
"    PATIENT:               ENIO HERRON  ACCT #:                  [id number]  MRN:                       [id number]  :                       1935  ADMIT DATE:       2025 1:56 PM  DISCH DATE:        7/3/2025 10:37 PM  RESPONDING PROVIDER #:        [id number]          PROVIDER RESPONSE TEXT:    Acute Kidney Injury    CDI QUERY TEXT:    Clarification    Instruction:    Based on your assessment of the patient and the clinical information, please provide the requested documentation by clicking on the appropriate radio button and enter any additional information if prompted.    Question: Please clarify a diagnosis for the patient renal status    When answering this query, please exercise your independent professional judgment. The fact that a question is being asked, does not imply that any particular answer is desired or expected.    The patient's clinical indicators include:  Clinical Information: Admit after fall with UTI    Clinical Indicators:    H&P states \"Weakness, fall, UTI\"    DC summary states \"Initial workup in ER shows tachycardia as well as elevated blood pressure, mild leukocytosis with WBC of 12, UA suggestive of UTI.  She was admitted with a diagnosis of new onset of A-fib with RVR as well as UTI leading to weakness and fall\"    Creatinine 0.40 on admit 25.    CT chest abd/pelvis with contrast performed 25.    Creatinine 1.31 on DC 7/3/25.    No noted CKD.    Treatment: lab monitoring, IV rocephin during stay, IV LR started at 75ml/hr on 7/3/25    Risk Factors: elevation in creatinine in setting of uti and ct contrast  Options provided:  -- Acute Kidney Injury  -- Other - I will add my own diagnosis  -- Refer to Clinical Documentation Reviewer    Query created by: Jese Yi on 2025 1:34 PM      Electronically signed by:  MARKIE BLEVINS MD PhD 2025 8:37 AM          "

## 2025-11-12 ENCOUNTER — APPOINTMENT (OUTPATIENT)
Dept: PRIMARY CARE | Facility: CLINIC | Age: 89
End: 2025-11-12
Payer: MEDICARE